# Patient Record
Sex: MALE | Employment: OTHER | URBAN - METROPOLITAN AREA
[De-identification: names, ages, dates, MRNs, and addresses within clinical notes are randomized per-mention and may not be internally consistent; named-entity substitution may affect disease eponyms.]

---

## 2018-05-15 PROBLEM — R56.9 SEIZURE (HCC): Status: ACTIVE | Noted: 2018-05-15

## 2018-05-15 PROBLEM — J18.9 PNEUMONIA: Status: ACTIVE | Noted: 2018-05-15

## 2018-05-15 PROBLEM — R11.2 NAUSEA AND VOMITING: Status: ACTIVE | Noted: 2018-05-15

## 2018-05-15 PROBLEM — F10.10 ALCOHOL ABUSE: Status: ACTIVE | Noted: 2018-05-15

## 2018-05-15 PROBLEM — E86.0 DEHYDRATION: Status: ACTIVE | Noted: 2018-05-15

## 2018-06-14 PROBLEM — E86.0 DEHYDRATION: Status: RESOLVED | Noted: 2018-05-15 | Resolved: 2018-06-14

## 2019-12-27 ENCOUNTER — HOSPITAL ENCOUNTER (EMERGENCY)
Age: 54
Discharge: HOME | End: 2019-12-27
Attending: EMERGENCY MEDICINE
Payer: MEDICARE

## 2019-12-27 VITALS
DIASTOLIC BLOOD PRESSURE: 48 MMHG | WEIGHT: 150 LBS | SYSTOLIC BLOOD PRESSURE: 132 MMHG | TEMPERATURE: 98.7 F | OXYGEN SATURATION: 98 % | BODY MASS INDEX: 21 KG/M2 | HEART RATE: 77 BPM | RESPIRATION RATE: 17 BRPM | HEIGHT: 71 IN

## 2019-12-27 DIAGNOSIS — G40.909 SEIZURE DISORDER (HCC): Primary | ICD-10-CM

## 2019-12-27 LAB
A/G RATIO: 0.7 (ref 1.1–2.2)
ALBUMIN SERPL-MCNC: 3.1 G/DL (ref 3.4–5)
ALP BLD-CCNC: 106 U/L (ref 40–129)
ALT SERPL-CCNC: 94 U/L (ref 10–40)
ANION GAP SERPL CALCULATED.3IONS-SCNC: 13 MMOL/L (ref 3–16)
AST SERPL-CCNC: 184 U/L (ref 15–37)
BASOPHILS ABSOLUTE: 0 K/UL (ref 0–0.2)
BASOPHILS RELATIVE PERCENT: 0.9 %
BILIRUB SERPL-MCNC: 1 MG/DL (ref 0–1)
BUN BLDV-MCNC: 9 MG/DL (ref 7–20)
CALCIUM SERPL-MCNC: 8.6 MG/DL (ref 8.3–10.6)
CHLORIDE BLD-SCNC: 98 MMOL/L (ref 99–110)
CO2: 26 MMOL/L (ref 21–32)
CREAT SERPL-MCNC: 0.9 MG/DL (ref 0.9–1.3)
EOSINOPHILS ABSOLUTE: 0 K/UL (ref 0–0.6)
EOSINOPHILS RELATIVE PERCENT: 0.7 %
GFR AFRICAN AMERICAN: >60
GFR NON-AFRICAN AMERICAN: >60
GLOBULIN: 4.4 G/DL
GLUCOSE BLD-MCNC: 124 MG/DL (ref 70–99)
HCT VFR BLD CALC: 36.1 % (ref 40.5–52.5)
HEMOGLOBIN: 12.1 G/DL (ref 13.5–17.5)
KEPPRA DOSE AMT: ABNORMAL
KEPPRA: <2 UG/ML (ref 6–46)
LYMPHOCYTES ABSOLUTE: 1.7 K/UL (ref 1–5.1)
LYMPHOCYTES RELATIVE PERCENT: 32.4 %
MCH RBC QN AUTO: 31.8 PG (ref 26–34)
MCHC RBC AUTO-ENTMCNC: 33.5 G/DL (ref 31–36)
MCV RBC AUTO: 95.2 FL (ref 80–100)
MONOCYTES ABSOLUTE: 0.4 K/UL (ref 0–1.3)
MONOCYTES RELATIVE PERCENT: 7.8 %
NEUTROPHILS ABSOLUTE: 3 K/UL (ref 1.7–7.7)
NEUTROPHILS RELATIVE PERCENT: 58.2 %
PDW BLD-RTO: 17.1 % (ref 12.4–15.4)
PLATELET # BLD: 105 K/UL (ref 135–450)
PMV BLD AUTO: 7.8 FL (ref 5–10.5)
POTASSIUM REFLEX MAGNESIUM: 4 MMOL/L (ref 3.5–5.1)
RBC # BLD: 3.79 M/UL (ref 4.2–5.9)
SODIUM BLD-SCNC: 137 MMOL/L (ref 136–145)
TOTAL PROTEIN: 7.5 G/DL (ref 6.4–8.2)
WBC # BLD: 5.1 K/UL (ref 4–11)

## 2019-12-27 PROCEDURE — 99284 EMERGENCY DEPT VISIT MOD MDM: CPT

## 2019-12-27 PROCEDURE — 80177 DRUG SCRN QUAN LEVETIRACETAM: CPT

## 2019-12-27 PROCEDURE — 6370000000 HC RX 637 (ALT 250 FOR IP): Performed by: EMERGENCY MEDICINE

## 2019-12-27 PROCEDURE — 85025 COMPLETE CBC W/AUTO DIFF WBC: CPT

## 2019-12-27 PROCEDURE — 36415 COLL VENOUS BLD VENIPUNCTURE: CPT

## 2019-12-27 PROCEDURE — 80053 COMPREHEN METABOLIC PANEL: CPT

## 2019-12-27 RX ORDER — ACETAMINOPHEN 500 MG
1000 TABLET ORAL ONCE
Status: COMPLETED | OUTPATIENT
Start: 2019-12-27 | End: 2019-12-27

## 2019-12-27 RX ORDER — LEVETIRACETAM 10 MG/ML
1000 INJECTION INTRAVASCULAR ONCE
Status: DISCONTINUED | OUTPATIENT
Start: 2019-12-27 | End: 2019-12-27

## 2019-12-27 RX ORDER — LEVETIRACETAM 500 MG/1
1000 TABLET ORAL ONCE
Status: COMPLETED | OUTPATIENT
Start: 2019-12-27 | End: 2019-12-27

## 2019-12-27 RX ADMIN — LEVETIRACETAM 1000 MG: 500 TABLET ORAL at 15:11

## 2019-12-27 RX ADMIN — ACETAMINOPHEN 1000 MG: 500 TABLET ORAL at 13:21

## 2019-12-27 ASSESSMENT — PAIN DESCRIPTION - PAIN TYPE: TYPE: ACUTE PAIN

## 2019-12-27 ASSESSMENT — PAIN SCALES - GENERAL
PAINLEVEL_OUTOF10: 4
PAINLEVEL_OUTOF10: 6

## 2020-12-29 ENCOUNTER — APPOINTMENT (OUTPATIENT)
Dept: GENERAL RADIOLOGY | Age: 55
DRG: 871 | End: 2020-12-29
Payer: MEDICARE

## 2020-12-29 ENCOUNTER — HOSPITAL ENCOUNTER (INPATIENT)
Age: 55
LOS: 6 days | Discharge: HOME | DRG: 871 | End: 2021-01-05
Attending: EMERGENCY MEDICINE | Admitting: HOSPITALIST
Payer: MEDICARE

## 2020-12-29 ENCOUNTER — APPOINTMENT (OUTPATIENT)
Dept: CT IMAGING | Age: 55
DRG: 871 | End: 2020-12-29
Payer: MEDICARE

## 2020-12-29 DIAGNOSIS — E83.42 HYPOMAGNESEMIA: ICD-10-CM

## 2020-12-29 DIAGNOSIS — G93.40 ACUTE ENCEPHALOPATHY: Primary | ICD-10-CM

## 2020-12-29 DIAGNOSIS — R50.9 ACUTE FEBRILE ILLNESS: ICD-10-CM

## 2020-12-29 DIAGNOSIS — R56.9 SEIZURES (HCC): ICD-10-CM

## 2020-12-29 DIAGNOSIS — E87.6 HYPOKALEMIA: ICD-10-CM

## 2020-12-29 LAB
BASOPHILS ABSOLUTE: 0 K/UL (ref 0–0.2)
BASOPHILS RELATIVE PERCENT: 0.3 %
EOSINOPHILS ABSOLUTE: 0 K/UL (ref 0–0.6)
EOSINOPHILS RELATIVE PERCENT: 0 %
HCT VFR BLD CALC: 36.8 % (ref 40.5–52.5)
HEMOGLOBIN: 11.5 G/DL (ref 13.5–17.5)
LYMPHOCYTES ABSOLUTE: 1.1 K/UL (ref 1–5.1)
LYMPHOCYTES RELATIVE PERCENT: 8.5 %
MCH RBC QN AUTO: 29.5 PG (ref 26–34)
MCHC RBC AUTO-ENTMCNC: 31.4 G/DL (ref 31–36)
MCV RBC AUTO: 93.9 FL (ref 80–100)
MONOCYTES ABSOLUTE: 1.1 K/UL (ref 0–1.3)
MONOCYTES RELATIVE PERCENT: 8.2 %
NEUTROPHILS ABSOLUTE: 11.2 K/UL (ref 1.7–7.7)
NEUTROPHILS RELATIVE PERCENT: 83 %
PDW BLD-RTO: 15.1 % (ref 12.4–15.4)
PLATELET # BLD: 176 K/UL (ref 135–450)
PMV BLD AUTO: 7.6 FL (ref 5–10.5)
RBC # BLD: 3.91 M/UL (ref 4.2–5.9)
WBC # BLD: 13.5 K/UL (ref 4–11)

## 2020-12-29 PROCEDURE — 87449 NOS EACH ORGANISM AG IA: CPT

## 2020-12-29 PROCEDURE — 6360000002 HC RX W HCPCS: Performed by: EMERGENCY MEDICINE

## 2020-12-29 PROCEDURE — 96374 THER/PROPH/DIAG INJ IV PUSH: CPT

## 2020-12-29 PROCEDURE — 36556 INSERT NON-TUNNEL CV CATH: CPT

## 2020-12-29 PROCEDURE — 99284 EMERGENCY DEPT VISIT MOD MDM: CPT

## 2020-12-29 PROCEDURE — 80053 COMPREHEN METABOLIC PANEL: CPT

## 2020-12-29 PROCEDURE — 85025 COMPLETE CBC W/AUTO DIFF WBC: CPT

## 2020-12-29 PROCEDURE — 82140 ASSAY OF AMMONIA: CPT

## 2020-12-29 PROCEDURE — 83690 ASSAY OF LIPASE: CPT

## 2020-12-29 PROCEDURE — G0480 DRUG TEST DEF 1-7 CLASSES: HCPCS

## 2020-12-29 PROCEDURE — 80307 DRUG TEST PRSMV CHEM ANLYZR: CPT

## 2020-12-29 PROCEDURE — 71045 X-RAY EXAM CHEST 1 VIEW: CPT

## 2020-12-29 PROCEDURE — 81003 URINALYSIS AUTO W/O SCOPE: CPT

## 2020-12-29 PROCEDURE — 84484 ASSAY OF TROPONIN QUANT: CPT

## 2020-12-29 PROCEDURE — 83605 ASSAY OF LACTIC ACID: CPT

## 2020-12-29 PROCEDURE — 80177 DRUG SCRN QUAN LEVETIRACETAM: CPT

## 2020-12-29 PROCEDURE — 83880 ASSAY OF NATRIURETIC PEPTIDE: CPT

## 2020-12-29 PROCEDURE — 93005 ELECTROCARDIOGRAM TRACING: CPT | Performed by: EMERGENCY MEDICINE

## 2020-12-29 PROCEDURE — 84145 PROCALCITONIN (PCT): CPT

## 2020-12-29 PROCEDURE — 82550 ASSAY OF CK (CPK): CPT

## 2020-12-29 PROCEDURE — 36415 COLL VENOUS BLD VENIPUNCTURE: CPT

## 2020-12-29 RX ORDER — LORAZEPAM 2 MG/ML
2 INJECTION INTRAMUSCULAR ONCE
Status: COMPLETED | OUTPATIENT
Start: 2020-12-29 | End: 2020-12-29

## 2020-12-29 RX ORDER — HALOPERIDOL 5 MG/ML
5 INJECTION INTRAMUSCULAR ONCE
Status: COMPLETED | OUTPATIENT
Start: 2020-12-30 | End: 2020-12-30

## 2020-12-29 RX ORDER — LORAZEPAM 2 MG/ML
1 INJECTION INTRAMUSCULAR EVERY 30 MIN PRN
Status: COMPLETED | OUTPATIENT
Start: 2020-12-29 | End: 2020-12-30

## 2020-12-29 RX ORDER — DIPHENHYDRAMINE HYDROCHLORIDE 50 MG/ML
50 INJECTION INTRAMUSCULAR; INTRAVENOUS ONCE
Status: COMPLETED | OUTPATIENT
Start: 2020-12-30 | End: 2020-12-30

## 2020-12-29 RX ORDER — ACETAMINOPHEN 500 MG
1000 TABLET ORAL ONCE
Status: DISCONTINUED | OUTPATIENT
Start: 2020-12-29 | End: 2020-12-30

## 2020-12-29 RX ORDER — SODIUM CHLORIDE 9 MG/ML
30 INJECTION, SOLUTION INTRAVENOUS ONCE
Status: COMPLETED | OUTPATIENT
Start: 2020-12-29 | End: 2020-12-30

## 2020-12-29 RX ADMIN — LORAZEPAM 2 MG: 2 INJECTION, SOLUTION INTRAMUSCULAR; INTRAVENOUS at 23:35

## 2020-12-30 ENCOUNTER — APPOINTMENT (OUTPATIENT)
Dept: CT IMAGING | Age: 55
DRG: 871 | End: 2020-12-30
Payer: MEDICARE

## 2020-12-30 LAB
A/G RATIO: 0.4 (ref 1.1–2.2)
ACETAMINOPHEN LEVEL: <5 UG/ML (ref 10–30)
ALBUMIN SERPL-MCNC: 2.9 G/DL (ref 3.4–5)
ALP BLD-CCNC: 106 U/L (ref 40–129)
ALT SERPL-CCNC: 14 U/L (ref 10–40)
AMMONIA: 67 UMOL/L (ref 16–60)
AMPHETAMINE SCREEN, URINE: NORMAL
ANION GAP SERPL CALCULATED.3IONS-SCNC: 15 MMOL/L (ref 3–16)
AST SERPL-CCNC: 48 U/L (ref 15–37)
BARBITURATE SCREEN URINE: NORMAL
BENZODIAZEPINE SCREEN, URINE: NORMAL
BILIRUB SERPL-MCNC: 1.2 MG/DL (ref 0–1)
BILIRUBIN URINE: NEGATIVE
BLOOD, URINE: NEGATIVE
BUN BLDV-MCNC: 7 MG/DL (ref 7–20)
CALCIUM SERPL-MCNC: 9.2 MG/DL (ref 8.3–10.6)
CANNABINOID SCREEN URINE: NORMAL
CHLORIDE BLD-SCNC: 100 MMOL/L (ref 99–110)
CLARITY: CLEAR
CO2: 25 MMOL/L (ref 21–32)
COCAINE METABOLITE SCREEN URINE: NORMAL
COLOR: YELLOW
CREAT SERPL-MCNC: 0.8 MG/DL (ref 0.9–1.3)
ETHANOL: NORMAL MG/DL (ref 0–0.08)
GFR AFRICAN AMERICAN: >60
GFR NON-AFRICAN AMERICAN: >60
GLOBULIN: 6.5 G/DL
GLUCOSE BLD-MCNC: 126 MG/DL (ref 70–99)
GLUCOSE URINE: NEGATIVE MG/DL
KEPPRA DOSE AMT: ABNORMAL
KEPPRA: <2 UG/ML (ref 6–46)
KETONES, URINE: NEGATIVE MG/DL
LACTIC ACID: 1.5 MMOL/L (ref 0.4–2)
LACTIC ACID: 1.8 MMOL/L (ref 0.4–2)
LACTIC ACID: 6.2 MMOL/L (ref 0.4–2)
LEUKOCYTE ESTERASE, URINE: NEGATIVE
LIPASE: 52 U/L (ref 13–60)
Lab: NORMAL
METHADONE SCREEN, URINE: NORMAL
MICROSCOPIC EXAMINATION: NORMAL
NITRITE, URINE: NEGATIVE
OPIATE SCREEN URINE: NORMAL
OXYCODONE URINE: NORMAL
PH UA: 7
PH UA: 7 (ref 5–8)
PHENCYCLIDINE SCREEN URINE: NORMAL
POTASSIUM REFLEX MAGNESIUM: 3.9 MMOL/L (ref 3.5–5.1)
PRO-BNP: 1757 PG/ML (ref 0–124)
PROCALCITONIN: 0.23 NG/ML (ref 0–0.15)
PROPOXYPHENE SCREEN: NORMAL
PROTEIN UA: NEGATIVE MG/DL
SALICYLATE, SERUM: <0.3 MG/DL (ref 15–30)
SARS-COV-2, NAAT: NOT DETECTED
SODIUM BLD-SCNC: 140 MMOL/L (ref 136–145)
SPECIFIC GRAVITY UA: 1.01 (ref 1–1.03)
TOTAL CK: 118 U/L (ref 39–308)
TOTAL PROTEIN: 9.4 G/DL (ref 6.4–8.2)
TROPONIN: <0.01 NG/ML
URINE REFLEX TO CULTURE: NORMAL
URINE TYPE: NORMAL
UROBILINOGEN, URINE: 0.2 E.U./DL

## 2020-12-30 PROCEDURE — 96366 THER/PROPH/DIAG IV INF ADDON: CPT

## 2020-12-30 PROCEDURE — 70450 CT HEAD/BRAIN W/O DYE: CPT

## 2020-12-30 PROCEDURE — 6360000002 HC RX W HCPCS: Performed by: HOSPITALIST

## 2020-12-30 PROCEDURE — 87150 DNA/RNA AMPLIFIED PROBE: CPT

## 2020-12-30 PROCEDURE — 2500000003 HC RX 250 WO HCPCS: Performed by: HOSPITALIST

## 2020-12-30 PROCEDURE — 96376 TX/PRO/DX INJ SAME DRUG ADON: CPT

## 2020-12-30 PROCEDURE — 95816 EEG AWAKE AND DROWSY: CPT | Performed by: PSYCHIATRY & NEUROLOGY

## 2020-12-30 PROCEDURE — 99223 1ST HOSP IP/OBS HIGH 75: CPT | Performed by: INTERNAL MEDICINE

## 2020-12-30 PROCEDURE — 83605 ASSAY OF LACTIC ACID: CPT

## 2020-12-30 PROCEDURE — 36415 COLL VENOUS BLD VENIPUNCTURE: CPT

## 2020-12-30 PROCEDURE — 87040 BLOOD CULTURE FOR BACTERIA: CPT

## 2020-12-30 PROCEDURE — 2500000003 HC RX 250 WO HCPCS: Performed by: EMERGENCY MEDICINE

## 2020-12-30 PROCEDURE — 2500000003 HC RX 250 WO HCPCS

## 2020-12-30 PROCEDURE — 87205 SMEAR GRAM STAIN: CPT

## 2020-12-30 PROCEDURE — 95819 EEG AWAKE AND ASLEEP: CPT

## 2020-12-30 PROCEDURE — 87186 SC STD MICRODIL/AGAR DIL: CPT

## 2020-12-30 PROCEDURE — 2580000003 HC RX 258: Performed by: HOSPITALIST

## 2020-12-30 PROCEDURE — 87070 CULTURE OTHR SPECIMN AEROBIC: CPT

## 2020-12-30 PROCEDURE — U0002 COVID-19 LAB TEST NON-CDC: HCPCS

## 2020-12-30 PROCEDURE — 2580000003 HC RX 258: Performed by: EMERGENCY MEDICINE

## 2020-12-30 PROCEDURE — 99223 1ST HOSP IP/OBS HIGH 75: CPT | Performed by: PSYCHIATRY & NEUROLOGY

## 2020-12-30 PROCEDURE — 96365 THER/PROPH/DIAG IV INF INIT: CPT

## 2020-12-30 PROCEDURE — 6360000002 HC RX W HCPCS: Performed by: EMERGENCY MEDICINE

## 2020-12-30 PROCEDURE — 96372 THER/PROPH/DIAG INJ SC/IM: CPT

## 2020-12-30 PROCEDURE — 2000000000 HC ICU R&B

## 2020-12-30 PROCEDURE — 2580000003 HC RX 258

## 2020-12-30 RX ORDER — SODIUM CHLORIDE 0.9 % (FLUSH) 0.9 %
10 SYRINGE (ML) INJECTION EVERY 12 HOURS SCHEDULED
Status: DISCONTINUED | OUTPATIENT
Start: 2020-12-30 | End: 2021-01-05 | Stop reason: HOSPADM

## 2020-12-30 RX ORDER — ZIPRASIDONE MESYLATE 20 MG/ML
20 INJECTION, POWDER, LYOPHILIZED, FOR SOLUTION INTRAMUSCULAR ONCE
Status: COMPLETED | OUTPATIENT
Start: 2020-12-30 | End: 2020-12-30

## 2020-12-30 RX ORDER — PROMETHAZINE HYDROCHLORIDE 25 MG/1
12.5 TABLET ORAL EVERY 6 HOURS PRN
Status: DISCONTINUED | OUTPATIENT
Start: 2020-12-30 | End: 2021-01-05 | Stop reason: HOSPADM

## 2020-12-30 RX ORDER — LORAZEPAM 2 MG/ML
3 INJECTION INTRAMUSCULAR
Status: DISCONTINUED | OUTPATIENT
Start: 2020-12-30 | End: 2021-01-05 | Stop reason: HOSPADM

## 2020-12-30 RX ORDER — MAGNESIUM SULFATE IN WATER 40 MG/ML
2 INJECTION, SOLUTION INTRAVENOUS PRN
Status: DISCONTINUED | OUTPATIENT
Start: 2020-12-30 | End: 2020-12-31 | Stop reason: SDUPTHER

## 2020-12-30 RX ORDER — ACETAMINOPHEN 650 MG/1
650 SUPPOSITORY RECTAL EVERY 6 HOURS PRN
Status: DISCONTINUED | OUTPATIENT
Start: 2020-12-30 | End: 2021-01-05 | Stop reason: HOSPADM

## 2020-12-30 RX ORDER — BENZONATATE 100 MG/1
100 CAPSULE ORAL 3 TIMES DAILY PRN
Status: DISCONTINUED | OUTPATIENT
Start: 2020-12-30 | End: 2021-01-05 | Stop reason: HOSPADM

## 2020-12-30 RX ORDER — SODIUM CHLORIDE 0.9 % (FLUSH) 0.9 %
10 SYRINGE (ML) INJECTION EVERY 12 HOURS SCHEDULED
Status: DISCONTINUED | OUTPATIENT
Start: 2020-12-30 | End: 2020-12-30 | Stop reason: SDUPTHER

## 2020-12-30 RX ORDER — POTASSIUM CHLORIDE 20 MEQ/1
40 TABLET, EXTENDED RELEASE ORAL PRN
Status: DISCONTINUED | OUTPATIENT
Start: 2020-12-30 | End: 2021-01-05 | Stop reason: HOSPADM

## 2020-12-30 RX ORDER — ONDANSETRON 2 MG/ML
4 INJECTION INTRAMUSCULAR; INTRAVENOUS EVERY 6 HOURS PRN
Status: DISCONTINUED | OUTPATIENT
Start: 2020-12-30 | End: 2021-01-05 | Stop reason: HOSPADM

## 2020-12-30 RX ORDER — POLYETHYLENE GLYCOL 3350 17 G/17G
17 POWDER, FOR SOLUTION ORAL DAILY PRN
Status: DISCONTINUED | OUTPATIENT
Start: 2020-12-30 | End: 2021-01-05 | Stop reason: HOSPADM

## 2020-12-30 RX ORDER — LORAZEPAM 2 MG/ML
2 INJECTION INTRAMUSCULAR
Status: DISCONTINUED | OUTPATIENT
Start: 2020-12-30 | End: 2021-01-05 | Stop reason: HOSPADM

## 2020-12-30 RX ORDER — LORAZEPAM 1 MG/1
3 TABLET ORAL
Status: DISCONTINUED | OUTPATIENT
Start: 2020-12-30 | End: 2021-01-05 | Stop reason: HOSPADM

## 2020-12-30 RX ORDER — SODIUM CHLORIDE 9 MG/ML
INJECTION, SOLUTION INTRAVENOUS CONTINUOUS
Status: DISCONTINUED | OUTPATIENT
Start: 2020-12-30 | End: 2021-01-01 | Stop reason: ALTCHOICE

## 2020-12-30 RX ORDER — LEVETIRACETAM 10 MG/ML
1000 INJECTION INTRAVASCULAR ONCE
Status: COMPLETED | OUTPATIENT
Start: 2020-12-30 | End: 2020-12-30

## 2020-12-30 RX ORDER — DEXMEDETOMIDINE HYDROCHLORIDE 4 UG/ML
0.2 INJECTION, SOLUTION INTRAVENOUS CONTINUOUS
Status: DISCONTINUED | OUTPATIENT
Start: 2020-12-30 | End: 2021-01-01

## 2020-12-30 RX ORDER — SODIUM CHLORIDE 0.9 % (FLUSH) 0.9 %
10 SYRINGE (ML) INJECTION PRN
Status: DISCONTINUED | OUTPATIENT
Start: 2020-12-30 | End: 2020-12-30 | Stop reason: SDUPTHER

## 2020-12-30 RX ORDER — LORAZEPAM 1 MG/1
2 TABLET ORAL
Status: DISCONTINUED | OUTPATIENT
Start: 2020-12-30 | End: 2021-01-05 | Stop reason: HOSPADM

## 2020-12-30 RX ORDER — LORAZEPAM 2 MG/ML
2 INJECTION INTRAMUSCULAR EVERY 4 HOURS PRN
Status: DISCONTINUED | OUTPATIENT
Start: 2020-12-30 | End: 2021-01-05 | Stop reason: HOSPADM

## 2020-12-30 RX ORDER — LANOLIN ALCOHOL/MO/W.PET/CERES
100 CREAM (GRAM) TOPICAL DAILY
Status: DISCONTINUED | OUTPATIENT
Start: 2020-12-30 | End: 2020-12-31 | Stop reason: SDUPTHER

## 2020-12-30 RX ORDER — ACETAMINOPHEN 650 MG/1
650 SUPPOSITORY RECTAL ONCE
Status: DISCONTINUED | OUTPATIENT
Start: 2020-12-30 | End: 2020-12-30 | Stop reason: HOSPADM

## 2020-12-30 RX ORDER — SODIUM CHLORIDE 0.9 % (FLUSH) 0.9 %
10 SYRINGE (ML) INJECTION PRN
Status: DISCONTINUED | OUTPATIENT
Start: 2020-12-30 | End: 2021-01-05 | Stop reason: HOSPADM

## 2020-12-30 RX ORDER — MULTIVITAMIN WITH IRON
1 TABLET ORAL DAILY
Status: DISCONTINUED | OUTPATIENT
Start: 2020-12-30 | End: 2021-01-05 | Stop reason: HOSPADM

## 2020-12-30 RX ORDER — POTASSIUM CHLORIDE 7.45 MG/ML
10 INJECTION INTRAVENOUS PRN
Status: DISCONTINUED | OUTPATIENT
Start: 2020-12-30 | End: 2021-01-05 | Stop reason: HOSPADM

## 2020-12-30 RX ORDER — ACETAMINOPHEN 325 MG/1
650 TABLET ORAL EVERY 6 HOURS PRN
Status: DISCONTINUED | OUTPATIENT
Start: 2020-12-30 | End: 2021-01-05 | Stop reason: HOSPADM

## 2020-12-30 RX ORDER — LORAZEPAM 2 MG/ML
1 INJECTION INTRAMUSCULAR
Status: DISCONTINUED | OUTPATIENT
Start: 2020-12-30 | End: 2021-01-05 | Stop reason: HOSPADM

## 2020-12-30 RX ORDER — LORAZEPAM 1 MG/1
4 TABLET ORAL
Status: DISCONTINUED | OUTPATIENT
Start: 2020-12-30 | End: 2021-01-05 | Stop reason: HOSPADM

## 2020-12-30 RX ORDER — LORAZEPAM 2 MG/ML
4 INJECTION INTRAMUSCULAR
Status: DISCONTINUED | OUTPATIENT
Start: 2020-12-30 | End: 2021-01-05 | Stop reason: HOSPADM

## 2020-12-30 RX ORDER — LORAZEPAM 1 MG/1
1 TABLET ORAL
Status: DISCONTINUED | OUTPATIENT
Start: 2020-12-30 | End: 2021-01-05 | Stop reason: HOSPADM

## 2020-12-30 RX ADMIN — Medication 10 ML: at 21:37

## 2020-12-30 RX ADMIN — ENOXAPARIN SODIUM 40 MG: 40 INJECTION SUBCUTANEOUS at 10:58

## 2020-12-30 RX ADMIN — LEVETIRACETAM 750 MG: 100 INJECTION, SOLUTION INTRAVENOUS at 22:10

## 2020-12-30 RX ADMIN — Medication 10 ML: at 10:58

## 2020-12-30 RX ADMIN — LORAZEPAM 1 MG: 2 INJECTION INTRAMUSCULAR; INTRAVENOUS at 00:00

## 2020-12-30 RX ADMIN — FAMOTIDINE 20 MG: 10 INJECTION, SOLUTION INTRAVENOUS at 10:58

## 2020-12-30 RX ADMIN — LEVETIRACETAM 1000 MG: 10 INJECTION INTRAVENOUS at 03:37

## 2020-12-30 RX ADMIN — FOLIC ACID: 5 INJECTION, SOLUTION INTRAMUSCULAR; INTRAVENOUS; SUBCUTANEOUS at 01:32

## 2020-12-30 RX ADMIN — METRONIDAZOLE 500 MG: 500 INJECTION, SOLUTION INTRAVENOUS at 07:08

## 2020-12-30 RX ADMIN — METRONIDAZOLE 500 MG: 500 INJECTION, SOLUTION INTRAVENOUS at 12:30

## 2020-12-30 RX ADMIN — VANCOMYCIN HYDROCHLORIDE 1000 MG: 1 INJECTION, POWDER, LYOPHILIZED, FOR SOLUTION INTRAVENOUS at 21:35

## 2020-12-30 RX ADMIN — ZIPRASIDONE MESYLATE 20 MG: 20 INJECTION, POWDER, LYOPHILIZED, FOR SOLUTION INTRAMUSCULAR at 00:55

## 2020-12-30 RX ADMIN — DIPHENHYDRAMINE HYDROCHLORIDE 50 MG: 50 INJECTION, SOLUTION INTRAMUSCULAR; INTRAVENOUS at 00:00

## 2020-12-30 RX ADMIN — VANCOMYCIN HYDROCHLORIDE 1000 MG: 1 INJECTION, POWDER, LYOPHILIZED, FOR SOLUTION INTRAVENOUS at 05:17

## 2020-12-30 RX ADMIN — SODIUM CHLORIDE 68 MCG: 9 INJECTION, SOLUTION INTRAVENOUS at 04:16

## 2020-12-30 RX ADMIN — SODIUM CHLORIDE: 9 INJECTION, SOLUTION INTRAVENOUS at 07:05

## 2020-12-30 RX ADMIN — CEFEPIME HYDROCHLORIDE 2 G: 2 INJECTION, POWDER, FOR SOLUTION INTRAVENOUS at 05:17

## 2020-12-30 RX ADMIN — SODIUM CHLORIDE 2040 ML: 9 INJECTION, SOLUTION INTRAVENOUS at 01:32

## 2020-12-30 RX ADMIN — DEXMEDETOMIDINE HYDROCHLORIDE 0.2 MCG/KG/HR: 4 INJECTION, SOLUTION INTRAVENOUS at 04:40

## 2020-12-30 RX ADMIN — LORAZEPAM 1 MG: 2 INJECTION INTRAMUSCULAR; INTRAVENOUS at 02:40

## 2020-12-30 RX ADMIN — METRONIDAZOLE 500 MG: 500 INJECTION, SOLUTION INTRAVENOUS at 22:54

## 2020-12-30 RX ADMIN — HALOPERIDOL LACTATE 5 MG: 5 INJECTION INTRAMUSCULAR at 00:00

## 2020-12-30 RX ADMIN — FAMOTIDINE 20 MG: 10 INJECTION, SOLUTION INTRAVENOUS at 22:06

## 2020-12-30 ASSESSMENT — PAIN SCALES - WONG BAKER: WONGBAKER_NUMERICALRESPONSE: 0

## 2020-12-30 NOTE — PROGRESS NOTES
Admitted from ED with acute encephalopathy and question of seizure activity. Placed in 2907 and put on cardiac monitor and oxygen. Currently on NS IV and precedex drip.

## 2020-12-30 NOTE — ED NOTES
Bed: 06  Expected date:   Expected time:   Means of arrival:   Comments:  Room 36 [facility]  12/30/20 9035

## 2020-12-30 NOTE — PROGRESS NOTES
[facility] PROGRESS NOTE    12/30/2020 3:20 PM        Name: Dot Calderón . Admitted: 12/29/2020  Primary Care Provider: No primary care provider on file. (Tel: None)      Chief Complaint   Patient presents with    Seizures     pt had three witnesses seizures per squad. pt with hx of seizures. pt post ictal upon squad arrival and upon pt arrival to ER. pt appears confused and restless. Brief History: Patient is a 59 yo male with hx seizure, ETOH abuse, tobacco use. He presented to ER per EMS after having multiple seizures. He was confused and agitated in ER requiring restraints, now on Precedex infusion. CT head with no acute abnormality. COVID-19, NAAT, negative. Subjective:  ROS unable to be obtained secondary sedation. Patient admitted early this morning by colleague, remains in ER, awaiting unit bed. Presently resting on stretch. He is sedated, on Precedex infusion.      Reviewed interval ancillary notes    Current Medications      sodium chloride flush 0.9 % injection 10 mL, 2 times per day      sodium chloride flush 0.9 % injection 10 mL, PRN      enoxaparin (LOVENOX) injection 40 mg, Daily      promethazine (PHENERGAN) tablet 12.5 mg, Q6H PRN    Or      ondansetron (ZOFRAN) injection 4 mg, Q6H PRN      polyethylene glycol (GLYCOLAX) packet 17 g, Daily PRN      acetaminophen (TYLENOL) tablet 650 mg, Q6H PRN    Or      acetaminophen (TYLENOL) suppository 650 mg, Q6H PRN      0.9 % sodium chloride infusion, Continuous      potassium chloride (KLOR-CON M) extended release tablet 40 mEq, PRN    Or      potassium bicarb-citric acid (EFFER-K) effervescent tablet 40 mEq, PRN    Or      potassium chloride 10 mEq/100 mL IVPB (Peripheral Line), PRN      magnesium sulfate 2 g in 50 mL IVPB premix, PRN      famotidine (PEPCID) injection 20 mg, BID      levETIRAcetam (KEPPRA) 750 mg in sodium chloride 0.9 % 100 mL IVPB, Q12H      LORazepam (ATIVAN) injection 2 mg, Q4H PRN      [START ON 12/31/2020] cefTRIAXone (ROCEPHIN) 2 g IVPB in D5W 50ml minibag, Q24H      metronidazole (FLAGYL) 500 mg in NaCl 100 mL IVPB premix, Q6H      benzonatate (TESSALON) capsule 100 mg, TID PRN      thiamine tablet 100 mg, Daily      multivitamin 1 tablet, Daily      LORazepam (ATIVAN) tablet 1 mg, Q1H PRN    Or      LORazepam (ATIVAN) injection 1 mg, Q1H PRN    Or      LORazepam (ATIVAN) tablet 2 mg, Q1H PRN    Or      LORazepam (ATIVAN) injection 2 mg, Q1H PRN    Or      LORazepam (ATIVAN) tablet 3 mg, Q1H PRN    Or      LORazepam (ATIVAN) injection 3 mg, Q1H PRN    Or      LORazepam (ATIVAN) tablet 4 mg, Q1H PRN    Or      LORazepam (ATIVAN) injection 4 mg, Q1H PRN      dexmedetomidine (PRECEDEX) 400 mcg in sodium chloride 0.9 % 100 mL infusion, Continuous      vancomycin 1000 mg IVPB in 250 mL D5W addavial, Q12H        Objective:  BP (!) 94/54   Pulse 78   Temp 97.5 °F (36.4 °C) (Axillary)   Resp 19   Ht 5' 11\" (1.803 m)   Wt 150 lb (68 kg)   SpO2 99%   BMI 20.92 kg/m²   No intake or output data in the 24 hours ending 12/30/20 1520   Wt Readings from Last 3 Encounters:   12/29/20 150 lb (68 kg)   12/27/19 150 lb (68 kg)   05/17/18 136 lb 3.9 oz (61.8 kg)       General appearance:  Sedated  Eyes: Pupils equal.  Cardiovascular: Sinus rhythm on tele. Regular rhythm, normal S1, S2. No murmur. No edema in lower extremities  Respiratory: Not using accessory muscles. Diminished throughout.  O2 sats high 90s room air   GI: Abdomen soft, not distended, normal bowel sounds  Musculoskeletal: Unable to assess  Neurology: Unable to assess  Skin: Warm, dry, normal turgor    Labs and Tests:  CBC:   Recent Labs     12/29/20  2350   WBC 13.5*   HGB 11.5*        BMP:    Recent Labs     12/29/20  2350      K 3.9      CO2 25   BUN 7   CREATININE 0.8*   GLUCOSE 126*     Hepatic:   Recent Labs 12/29/20  2350   AST 48*   ALT 14   BILITOT 1.2*   ALKPHOS 106       CXR 12/29/2020:  Scarring/atelectatic changes seen overlying the lateral aspect of the right   mid to lower lung field and right lung base.  Possible scarring and bullous   changes of the left lung apex is redemonstrated.  No confluent airspace   opacity or pleural effusion is seen. CT Head 12/30/2020:  No acute intracranial abnormality.       Moderate white matter disease, likely chronic small vessel ischemia. EEG 12/30/2020: Impression: This EEG is abnormal.  The generalized diffuse slowing is suggestive of mild to moderate diffuse encephalopathy. There is no evidence of epileptiform discharges, focal, or lateralizing abnormalities. Problem List  Active Problems:    Partial epilepsy with impairment of consciousness, intractable (HCC)    Personal history of noncompliance with medical treatment    Seizure secondary to subtherapeutic anticonvulsant medication ([address]    Partial symptomatic epilepsy with complex partial seizures, intractable, without status epilepticus ([address]    Noncompliance with medication regimen    Seizure ([address]    Alcohol abuse    Breakthrough seizure ([address]    Acute encephalopathy  Resolved Problems:    * No resolved hospital problems. *       Assessment & Plan:   1. Recurrent seizures. Possibly secondary to noncompliance, Keppra level < 2.0. Continue IV Keppra. Appreciate neurology recs. 2. Acute encephalopathy. Cause unclear. CT scan with no acute abnormality. EEG with mild to moderate diffuse encephalopathy. Neurology on board, considered less likely meningoencephalitis. Currently on Precedex secondary agitation. 3. ETOH abuse. Blood ETOH negative, urine drug screen negative. 4. Fever/sepsis present on admission. Temp 101.8 in ER,  and RR 27. Severe lactic acidosis 6.2->1.8->1.5, received IV fluids. WBC 13.5, procalcitonin 0.23, blood cultures x 2 in process. Source of infection not clear.  CXR with aspiration PNA versus atelectasis. COVID NAAT negative. Started on empiric antibiotics. ID consult pending.       Diet: Diet NPO Effective Now  Code:Full Code  DVT PPX: enoxaparin      KEEGAN Cage - CNP   12/30/2020 3:20 PM

## 2020-12-30 NOTE — CONSULTS
movements : midline  Musculoskeletal:  The patient can move all 4 extremities. No apparent focal weakness. Tone: Normal tone. No rigidity. Reflexes: 2+ in the arms and legs. Planters: flexor bilaterally. Coordination: NT due to confusion  Sensation: NT due to confusion  Gait/Posture: NT due to confusion        Data:  LABS:   Lab Results   Component Value Date     12/29/2020    K 3.9 12/29/2020     12/29/2020    CO2 25 12/29/2020    BUN 7 12/29/2020    CREATININE 0.8 12/29/2020    GFRAA >60 12/29/2020    LABGLOM >60 12/29/2020    GLUCOSE 126 12/29/2020    MG 1.50 05/17/2018    CALCIUM 9.2 12/29/2020     Lab Results   Component Value Date    WBC 13.5 12/29/2020    RBC 3.91 12/29/2020    HGB 11.5 12/29/2020    HCT 36.8 12/29/2020    MCV 93.9 12/29/2020    RDW 15.1 12/29/2020     12/29/2020     Lab Results   Component Value Date    INR 1.06 05/16/2018    PROTIME 12.0 05/16/2018       Neuroimaging were independently reviewed by me  Reviewed notes from different physicians  Reviewed lab and blood testing    Impression:  Acute encephalopathy and new onset seizures. So far unknown cause. Could be related to chronic alcohol abuse, ? History of epilepsy or underlying metabolic encephalopathy. Less likely meningoencephalitis as this point although will reconsider LP if he spiked another fever. Chronic alcohol abuse  History of seizures  Smoker  Rule out sepsis    Recommendation:  Continue current supportive care  ID work-up with blood and urine cultures  DT precautions  Continue Keppra 500 mg IV twice daily for now  Hydration  DVT and GI prophylaxis  Neurochecks  Blood pressure monitoring  EEG  Nicotine patch  Follow CBC and CMP with B1,2,  TSH and folate  We will follow. Thank you for referring such patient. If you have any questions regarding my consult note, please don't hesitate to call me.      Nancy Chaves MD  [phone]    This dictation was generated by voice recognition computer

## 2020-12-30 NOTE — ED NOTES
Assisted Dr. Carol Cerda with insertion of central line right femoral. NS infusing at present with Banana bag and Keppra infusing. No seizure activity noted but pt remains agitated. Respirations even and unlabored. Seizure pads in place.       Gladis Estevez RN  12/30/20 5288

## 2020-12-30 NOTE — PROGRESS NOTES
Clinical Pharmacy Note:    Pharmacy consulted to dose Vancomycin for pneumonia per Dr. Melia Rdz. Age: 60   Height: 5'11\"  Weight: 68 kg  Scr:0.8mg/dL      Started Vancomycin 1000 IV q12. Trough ordered before 4th dose (12/31/20 @8340) with an order to hold if trough greater than 20mcg/ml. Thanks for the consult!   Betsy Gasca, PharmD, [facility]

## 2020-12-30 NOTE — ED PROVIDER NOTES
[facility] Emergency Department    CHIEF COMPLAINT  Chief Complaint   Patient presents with    Seizures     pt had three witnesses seizures per squad. pt with hx of seizures. pt post ictal upon squad arrival and upon pt arrival to ER. pt appears confused and restless. HISTORY OF PRESENT ILLNESS  Lorie Carey is a 60 y.o. male  who presents to the ED complaining of apparently 3 witnessed seizures, unclear how they were described upon initial presentation. Patient is confused and disoriented upon arrival.  He does admit to drinking alcohol and mostly he is confused and disoriented and restless however at one point through his mumbling I believe he says he may have smoked marijuana as well. He is supposed to be on Keppra. Unclear if he is compliant or not. His last ED evaluation for seizures was about a year ago. Patient is able to tell me that he does not have a headache or neck pain or stiffness, does not have any chest or abdominal pains, or recent illnesses. However he is febrile at 101.8 on arrival, tachycardic, and confused. He tells me he has not been coughing or short of breath. No other complaints, modifying factors or associated symptoms. I have reviewed the following from the nursing documentation. Past Medical History:   Diagnosis Date    Alcohol abuse     Seizures ([address]      History reviewed. No pertinent surgical history. History reviewed. No pertinent family history.   Social History     Socioeconomic History    Marital status: Single     Spouse name: Not on file    Number of children: 0    Years of education: Not on file    Highest education level: Not on file   Occupational History    Not on file   Social Needs    Financial resource strain: Not on file    Food insecurity     Worry: Not on file     Inability: Not on file    Transportation needs     Medical: Not on file     Non-medical: Not on file   Tobacco Use    Smoking status: Current Every Day Smoker     Packs/day: 1.00     Years: 40.00     Pack years: 40.00    Smokeless tobacco: Never Used   Substance and Sexual Activity    Alcohol use:  Yes     Alcohol/week: 2.0 standard drinks     Types: 2 Cans of beer per week     Comment: every other week    Drug use: Yes     Types: Marijuana     Comment: once every other weekend (+ on drug screen 05/15/18)    Sexual activity: Not on file   Lifestyle    Physical activity     Days per week: Not on file     Minutes per session: Not on file    Stress: Not on file   Relationships    Social connections     Talks on phone: Not on file     Gets together: Not on file     Attends Confucianist service: Not on file     Active member of club or organization: Not on file     Attends meetings of clubs or organizations: Not on file     Relationship status: Not on file    Intimate partner violence     Fear of current or ex partner: Not on file     Emotionally abused: Not on file     Physically abused: Not on file     Forced sexual activity: Not on file   Other Topics Concern    Not on file   Social History Narrative    ** Merged History Encounter **         ** Merged History Encounter **          Current Facility-Administered Medications   Medication Dose Route Frequency Provider Last Rate Last Admin    sterile water injection             vancomycin 1000 mg IVPB in 250 mL D5W addavial  15 mg/kg Intravenous Once Ricardo Can MD        cefepime (MAXIPIME) 2 g IVPB minibag  2 g Intravenous Once Ricardo Can MD        acetaminophen (TYLENOL) suppository 650 mg  650 mg Rectal Once Ricardo Can MD        levetiracetam (KEPPRA) 1000 mg/100 mL IVPB  1,000 mg Intravenous Once Ricardo Can MD         Current Outpatient Medications   Medication Sig Dispense Refill    levETIRAcetam (KEPPRA) 750 MG tablet Take 1 tablet by mouth 2 times daily 60 tablet 3    risperiDONE (RISPERDAL) 1 MG tablet Take 1 mg by mouth nightly      folic acid (FOLVITE) 1 Negative mg/dL    Bilirubin Urine Negative Negative    Ketones, Urine Negative Negative mg/dL    Specific Gravity, UA 1.011 1.005 - 1.030    Blood, Urine Negative Negative    pH, UA 7.0 5.0 - 8.0    Protein, UA Negative Negative mg/dL    Urobilinogen, Urine 0.2 <2.0 E.U./dL    Nitrite, Urine Negative Negative    Leukocyte Esterase, Urine Negative Negative    Microscopic Examination Not Indicated     Urine Type Voided     Urine Reflex to Culture Not Indicated    Urine Drug Screen   Result Value Ref Range    Amphetamine Screen, Urine Neg Negative <1000ng/mL    Barbiturate Screen, Ur Neg Negative <200 ng/mL    Benzodiazepine Screen, Urine Neg Negative <200 ng/mL    Cannabinoid Scrn, Ur Neg Negative <50 ng/mL    Cocaine Metabolite Screen, Urine Neg Negative <300 ng/mL    Opiate Scrn, Ur Neg Negative <300 ng/mL    PCP Screen, Urine Neg Negative <25 ng/mL    Methadone Screen, Urine Neg Negative <300 ng/mL    Propoxyphene Scrn, Ur Neg Negative <300 ng/mL    Oxycodone Urine Neg Negative <100 ng/ml    pH, UA 7.0     Drug Screen Comment: see below    CBC auto differential   Result Value Ref Range    WBC 13.5 (H) 4.0 - 11.0 K/uL    RBC 3.91 (L) 4.20 - 5.90 M/uL    Hemoglobin 11.5 (L) 13.5 - 17.5 g/dL    Hematocrit 36.8 (L) 40.5 - 52.5 %    MCV 93.9 80.0 - 100.0 fL    MCH 29.5 26.0 - 34.0 pg    MCHC 31.4 31.0 - 36.0 g/dL    RDW 15.1 12.4 - 15.4 %    Platelets 942 634 - 659 K/uL    MPV 7.6 5.0 - 10.5 fL    Neutrophils % 83.0 %    Lymphocytes % 8.5 %    Monocytes % 8.2 %    Eosinophils % 0.0 %    Basophils % 0.3 %    Neutrophils Absolute 11.2 (H) 1.7 - 7.7 K/uL    Lymphocytes Absolute 1.1 1.0 - 5.1 K/uL    Monocytes Absolute 1.1 0.0 - 1.3 K/uL    Eosinophils Absolute 0.0 0.0 - 0.6 K/uL    Basophils Absolute 0.0 0.0 - 0.2 K/uL   Comprehensive Metabolic Panel w/ Reflex to MG   Result Value Ref Range    Sodium 140 136 - 145 mmol/L    Potassium reflex Magnesium 3.9 3.5 - 5.1 mmol/L    Chloride 100 99 - 110 mmol/L    CO2 25 21 - 32 mmol/L    Anion Gap 15 3 - 16    Glucose 126 (H) 70 - 99 mg/dL    BUN 7 7 - 20 mg/dL    CREATININE 0.8 (L) 0.9 - 1.3 mg/dL    GFR Non-African American >60 >60    GFR African American >60 >60    Calcium 9.2 8.3 - 10.6 mg/dL    Total Protein 9.4 (H) 6.4 - 8.2 g/dL    Alb 2.9 (L) 3.4 - 5.0 g/dL    Albumin/Globulin Ratio 0.4 (L) 1.1 - 2.2    Total Bilirubin 1.2 (H) 0.0 - 1.0 mg/dL    Alkaline Phosphatase 106 40 - 129 U/L    ALT 14 10 - 40 U/L    AST 48 (H) 15 - 37 U/L    Globulin 6.5 g/dL   Troponin   Result Value Ref Range    Troponin <0.01 <0.01 ng/mL   Brain Natriuretic Peptide   Result Value Ref Range    Pro-BNP 1,757 (H) 0 - 124 pg/mL   Lactic Acid, Plasma   Result Value Ref Range    Lactic Acid 6.2 (HH) 0.4 - 2.0 mmol/L   Levetiracetam level   Result Value Ref Range    KEPPRA Dose Amt Unknown    Ethanol   Result Value Ref Range    Ethanol Lvl None Detected mg/dL   CK   Result Value Ref Range    Total  39 - 308 U/L   Lipase   Result Value Ref Range    Lipase 52.0 13.0 - 60.0 U/L   Acetaminophen level   Result Value Ref Range    Acetaminophen Level <5 (L) 10 - 30 ug/mL   Salicylate   Result Value Ref Range    Salicylate, Serum <9.3 (L) 15.0 - 30.0 mg/dL   Ammonia   Result Value Ref Range    Ammonia 67 (H) 16 - 60 umol/L   COVID-19   Result Value Ref Range    SARS-CoV-2, NAAT Not Detected Not Detected     The 12 lead EKG was interpreted by me as follows:  Rate: tachycardia with a rate of 135  Rhythm: sinus  Axis: normal  Intervals: normal MS, narrow QRS, normal QTc  ST segments: no ST elevations or depressions  T waves: no abnormal inversions  Non-specific T wave changes: present  Prior EKG comparison: EKG dated 5/15/18 is not significantly different    RADIOLOGY    Ct Head Wo Contrast    Result Date: 12/30/2020  EXAMINATION: CT OF THE HEAD WITHOUT CONTRAST  12/29/2020 11:40 pm TECHNIQUE: CT of the head was performed without the administration of intravenous contrast. Dose modulation, iterative reconstruction, and/or weight based adjustment of the mA/kV was utilized to reduce the radiation dose to as low as reasonably achievable. COMPARISON: 05/15/2018 HISTORY: ORDERING SYSTEM PROVIDED HISTORY: fever ams seizures TECHNOLOGIST PROVIDED HISTORY: Reason for exam:->fever ams seizures Has a \"code stroke\" or \"stroke alert\" been called? ->No Reason for Exam: Seizures (pt had three witnesses seizures per squad. pt with hx of seizures. pt post ictal upon squad arrival and upon pt arrival to ER. pt appears confused and restless. ) Acuity: Acute Type of Exam: Initial FINDINGS: BRAIN/VENTRICLES: There is mild generalized atrophy and there is moderate patchy periventricular and subcortical white matter low attenuation that is nonspecific but most consistent with chronic small vessel ischemia. There is no acute intracranial hemorrhage, mass effect or midline shift. No abnormal extra-axial fluid collection. The gray-white differentiation is maintained without evidence of an acute infarct. There is no evidence of hydrocephalus. ORBITS: The visualized portion of the orbits demonstrate no acute abnormality. SINUSES: The visualized paranasal sinuses and mastoid air cells demonstrate no acute abnormality. SOFT TISSUES/SKULL:  No acute abnormality of the visualized skull or soft tissues. No acute intracranial abnormality. Moderate white matter disease, likely chronic small vessel ischemia. Xr Chest Portable    Result Date: 12/30/2020  EXAMINATION: ONE XRAY VIEW OF THE CHEST 12/29/2020 11:46 pm COMPARISON: 05/15/2018. HISTORY: ORDERING SYSTEM PROVIDED HISTORY: fever ams TECHNOLOGIST PROVIDED HISTORY: Reason for exam:->fever ams Reason for Exam: fever/AMS Acuity: Acute Type of Exam: Initial FINDINGS: The cardiac silhouette appears within normal limits. There is redemonstration of scarring/atelectatic changes seen affecting the lateral aspect of the right upper to mid lung field.   Scarring/possible bullous changes of the left lung apex is redemonstrated. Additional scarring/atelectatic changes are seen at the right lung base. No confluent airspace opacity, large pleural effusion or pneumothorax is seen. There is redemonstration of chronic appearing right rib deformities. Scarring/atelectatic changes seen overlying the lateral aspect of the right mid to lower lung field and right lung base. Possible scarring and bullous changes of the left lung apex is redemonstrated. No confluent airspace opacity or pleural effusion is seen. ED COURSE/MDM  Patient seen and evaluated. Old records reviewed. Labs and imaging reviewed and results discussed with patient. After initial evaluation, differential diagnostic considerations included: stroke, TIA, intracranial bleed, trauma, infection/sepsis, medication side effect, intoxication/withdrawal, metabolic/electrolyte abnormalities    The patient's ED workup was notable for multifactorial encephalopathy, initially thought may have been postictal given reports of seizures at home. He has had no seizures here. He was loaded with Keppra. He was able to tell me that he had no headache or neck stiffness, he was also able to tell me that he drank alcohol and marijuana however his toxicologic evaluation here is essentially negative. He was given Tylenol for fever and initiated broad-spectrum antibiotics for sepsis of unknown source. He was continually agitated in the ED despite multiple rounds of chemical sedation and now is in soft point restraints because he continues to try to pull out his IV and remains encephalopathic. CT of the head is unremarkable. Chest x-ray shows some scarring and atelectatic changes however he has had a cough on exam and so Covid is currently swabbed and pending at this time.   Currently based on his mental status, lumbar puncture would be essentially impossible given the amount of chemical sedation he is required and now is in restraints-LP would not be safe to dressing. A post procedure X-ray was not indicated. The patient tolerated the procedure well. Complications: None    Ultrasound guided central venous access  Indication: need for central access as documented above  Views:  Long access view of target vein: Adequate  Short access view of target vein: Adequate  Adjacent artery: Adequate    Images obtained with findings:   Vein compressible: yes  Needle tip within vein: yes    Impression:   Successful cannulation of central vein: yes     Images obtained by [facility], interpreted by [facility]. During the patient's ED course, the patient was given:  Medications   sterile water injection (has no administration in time range)   vancomycin 1000 mg IVPB in 250 mL D5W addavial (has no administration in time range)   cefepime (MAXIPIME) 2 g IVPB minibag (has no administration in time range)   acetaminophen (TYLENOL) suppository 650 mg (has no administration in time range)   levetiracetam (KEPPRA) 1000 mg/100 mL IVPB (has no administration in time range)   0.9 % sodium chloride infusion (2,040 mLs Intravenous New Bag 12/30/20 0132)   sodium chloride 0.9 % 8,536 mL with folic acid 1 mg, adult multi-vitamin with vitamin k 10 mL, thiamine 100 mg ( Intravenous New Bag 12/30/20 0132)   LORazepam (ATIVAN) injection 2 mg (2 mg Intravenous Given 12/29/20 2335)   LORazepam (ATIVAN) injection 1 mg (1 mg Intravenous Given 12/30/20 0240)   haloperidol lactate (HALDOL) injection 5 mg (5 mg Intramuscular Given 12/30/20 0000)   diphenhydrAMINE (BENADRYL) injection 50 mg (50 mg Intramuscular Given 12/30/20 0000)   ziprasidone (GEODON) injection 20 mg (20 mg Intramuscular Given 12/30/20 0055)        CLINICAL IMPRESSION  1. Acute encephalopathy    2. Seizures ([facility] [address]    3. Acute febrile illness        Blood pressure 138/77, pulse 146, temperature 101.8 °F (38.8 °C), temperature source Oral, resp. rate 20, weight 150 lb (68 kg), SpO2 96 %.     Adrian Husbands CHARLINEula December was admitted in fair condition. The plan is to admit to the hospital at this time under the hospitalist service. Dr. Amada Angelucci accepted the patient and will take over the patient's care. The total critical care time spent while evaluating and treating this patient was 40 minutes. This excludes time spent doing separately billable procedures. This includes time at the bedside, data interpretation, medication management, obtaining critical history from collateral sources if the patient is unable to provide it directly, and physician consultation. Specifics of interventions taken and potentially life-threatening diagnostic considerations are listed above in the medical decision making. DISCLAIMER: This chart was created using Dragon dictation software. Efforts were made by me to ensure accuracy, however some errors may be present due to limitations of this technology and occasionally words are not transcribed correctly.         Brant Varghese MD  12/30/20 1529       Brant Varghese MD  12/30/20 9866

## 2020-12-30 NOTE — CONSULTS
Infectious Diseases   Consult Note        Admission Date: 12/29/2020  Hospital Day: Hospital Day: 2   Attending: Lynne Dixon MD  Date of service: 12/30/20     Reason for admission: Seizure [facility]) [R56.9]    Chief complaint/ Reason for consult: Severe sepsis    Microbiology:        I have reviewed allavailable micro lab data and cultures    · Blood culture (2/2) - collected on 12/29/2020: In process      Antibiotics and immunizations:       Current antibiotics: All antibiotics and their doses were reviewed by me    Recent Abx Admin                   metronidazole (FLAGYL) 500 mg in NaCl 100 mL IVPB premix (mg) 500 mg New Bag 12/30/20 1230     500 mg New Bag  0708    cefepime (MAXIPIME) 2 g IVPB minibag (g) 2 g New Bag 12/30/20 0517    vancomycin 1000 mg IVPB in 250 mL D5W addavial (mg) 1,000 mg New Bag 12/30/20 0517                  Immunization History: All immunization history was reviewed by me today. There is no immunization history on file for this patient. Known drug allergies: All allergies were reviewed and updated    Allergies   Allergen Reactions    Carbamazepine Other (See Comments)     Thrombocytopenia    Codeine Itching     Per ER    Phenytoin Itching    Sulfa Antibiotics      Per ER       Social history:     Social History:  All social andepidemiologic history was reviewed and updated by me today as needed. · Tobacco use:   reports that he has been smoking. He has a 40.00 pack-year smoking history. He has never used smokeless tobacco.  · Alcohol use:   reports current alcohol use of about 2.0 standard drinks of alcohol per week. · Currently lives in: [place] [id number]  ·  reports current drug use. Drug: Marijuana. Assessment:     The patient is a 60 y.o. old male who  has a past medical history of Alcohol abuse and Seizures ([facility] [address]  with following problems:    · Sepsis with high fever, bandemia, metabolic encephalopathy, hypotension  · Severe lactic acidosis with serum lactate of 6.2 on admission  · Right lower lobe pneumonia  · Postictal state on presentation  · Negative urine tox screen  · History of marijuana abuse  · Chronic small vessel ischemia  · History of alcohol abuse  · Smoker      Discussion: This is a 78-year-old man with history of alcohol abuse, admitted with altered mental state, high fever of 101.8 and elevated white cell count of 13,500 in setting of recent seizure episodes. Compliance with Keppra at home is unknown    I reviewed his chest x-ray from admission. It shows a right lower lobe infiltrate. He had an EEG done today which showed generalized diffuse slowing indicating mild to moderate diffuse encephalopathy. He has also been running hypotensive. Rapid COVID-19 test negative. COVID-19 PCR has not been done    Urinalysis on admission was unremarkable    Plan:     Diagnostic Workup:    · Will order 2 sets of blood cultures today  · Will order respiratory viral PCR panel with COVID-19 PCR  · Urine Legionella and pneumococcal antigen  · Nasal MRSA probe  · Continue to follow fever curve, WBC count and blood cultures  · Follow up on liverand renal functions closely  · Repeat a portable chest x-ray tomorrow    Antimicrobials:    · Will continue empiric broad-spectrum antibiotics for now  · Continue IV vancomycin with target vancomycin trough level of 15-20  Check Vancomycin trough before the 5th dose. Keep vancomycin trough below 20 at all times. Avoid increasing the dose of vancomycin above a total of 4 grams in a 24-hour period in patients younger than 45 years and above 3 grams in a 24-hour period in a patient of age 39 years or older. Continue to monitor serum creatinine and Vanco levels closely, while the patient is on I/v Vancomycin.    · Continue IV ceftriaxone 2 g every 24 hour  · Will continue IV Flagyl 500 mg every 8 hours*  · We will follow up on the culture results and clinical progress and will make further recommendations accordingly. · Continue close vitals monitoring. · Maintain good glycemic control. · Fall precautions. Aspiration precautions. · Continue to watch for new fever or diarrhea. · DVT prophylaxis. · Discussed all above with RN. Drug Monitoring:    · Continue serial monitoring for antibiotic toxicity as follows: Vancomycin trough, CBC, CMP*  · Continue to watch for following: new or worsening fever, hypotension, hives, lip swelling and redness or purulence at vascular access sites. I/v access Management:    · Continue to monitor i.v access sites for erythema, induration, discharge or tenderness. · As always, continue efforts to minimizetubes/lines/drains as clinically appropriate to reduce chances of line associated infections. Current isolation precautions:    Currently active isolation(s): Droplet Plus       Level of complexity of consult: High     Risk of Complications/Morbidity: High     · Illness(es)/ Infection present that pose threat to life/bodily function. · There is potential for severe exacerbation of infection/side effects of treatment. · Therapy requires intensive monitoring for antimicrobial agent toxicity. Thank you for involving me in the care of your patient. I will continue to follow. If you have any additional questions, please do not hesitate to contact me. Subjective:     Presenting complaint in ER:     Chief Complaint   Patient presents with    Seizures     pt had three witnesses seizures per squad. pt with hx of seizures. pt post ictal upon squad arrival and upon pt arrival to ER. pt appears confused and restless. HPI: Marielle Nolasco is a 60 y.o. male patient, who was seen at the request of Dr. Cristina Rosa MD.    History was obtained from chart review and RN as patient is encephalopathic    The patient was admitted on 12/29/2020. I have been consulted to see the patient for above mentioned reason(s).     The patient has multiple medical comorbidities, and presented to the ER for 3 episodes of witnessed seizures and altered mental state. The patient is history of seizure disorder and has been on Keppra in the past.  Recent compliance with Keppra unknown. In the ER, the patient was confused and a tachycardic and was noted to have a fever of 101.8. White cell count was 13,500. He was admitted. Blood cultures have been sent. I have been asked for my opinion for management for this patient. Past Medical History: All past medical history reviewed today. Past Medical History:   Diagnosis Date    Alcohol abuse     Seizures ([facility] [address]          Past Surgical History: All pastsurgical history was reviewed today. History reviewed. No pertinent surgical history. Family History: All family history was reviewed today. History reviewed. No pertinent family history. Medications: All current and past medications were reviewed. Not in a hospital admission.  sodium chloride flush  10 mL Intravenous 2 times per day    enoxaparin  40 mg Subcutaneous Daily    famotidine (PEPCID) injection  20 mg Intravenous BID    levetiracetam  750 mg Intravenous Q12H    [START ON 12/31/2020] cefTRIAXone (ROCEPHIN) IV  2 g Intravenous Q24H    metroNIDAZOLE  500 mg Intravenous Q6H    thiamine  100 mg Oral Daily    multivitamin  1 tablet Oral Daily    vancomycin  1,000 mg Intravenous Q12H          REVIEW OF SYSTEMS:       Review of Systems   Unable to perform ROS: Mental status change         Objective:       PHYSICAL EXAM:      Vitals:   Vitals:    12/30/20 1430 12/30/20 1500 12/30/20 1545 12/30/20 1630   BP: 99/65 102/65 112/66 128/75   Pulse: 76 76 79 78   Resp: 18 18 21 19   Temp:       TempSrc:       SpO2: 100% 100% 99% 99%   Weight:       Height:           Physical Exam      PHYSICAL EXAM:     In-person bedside physical examination deferred.   Pursuant to the emergency declaration under the [address] and the National Emergencies Act, [address] [facility] waiver authority and the "[employer] and [employer] Act, this clinical encounter was conducted to provide necessary medical care. (Also consistent with new provisions and guidance offered by [facility] on March 18, 2020 in setting of COVID 19 outbreak and in order to preserve personal protective equipment in accordance with the flexibilities announced by CMS on March 30, 2020)   References: https://[facility]. [facility]/Portals/0/Resources/COVID-19/3_18%20Telemed%20Guidance%20Updated%20March%2018. pdf?ecl=[email or web address]                      https://[facility]. [facility]/Portals/0/Resources/COVID-19/3_18%20Telemed%20Guidance%20Updated%20March%2018. pdf?imr=[email or web address]                      http://[employer]/. pdf                            General: encephalopathic per primary service, vitals reviewed  HEENT: deferred  Cardiovascular:Telemetry data reviewed, rest deferred   Pulmonary: deferred  Abdomen/GI: deferred  Neuro: encephalopathic per primary service  Skin: deferred  Musculoskeletal:  deferred  Genitourinary: Deferred  Psych: deferred  Lymphatic/Immunologic: deferred          Intake and output:     No intake/output data recorded. Lab Data:   All available labs were reviewed by me today.      CBC:   Recent Labs     12/29/20  2350   WBC 13.5*   RBC 3.91*   HGB 11.5*   HCT 36.8*      MCV 93.9   MCH 29.5   MCHC 31.4   RDW 15.1        BMP:  Recent Labs     12/29/20  2350      K 3.9      CO2 25   BUN 7   CREATININE 0.8*   CALCIUM 9.2   GLUCOSE 126*        Hepatic FunctionPanel:   Lab Results   Component Value Date    ALKPHOS 106 12/29/2020    ALT 14 12/29/2020    AST 48 12/29/2020    PROT 9.4 12/29/2020    BILITOT 1.2 12/29/2020    BILIDIR 1.3 05/16/2018    IBILI 0.7 05/16/2018    LABALBU 2.9 12/29/2020       CPK:   Lab Results   Component Value Date    CKTOTAL contact me through [employer] in [employer] or at the phone number provided below with my electronic signature. Any pictures or media included in this note were obtained after taking informed verbal consent from the patient and with their approval to include those in the patient's medical record.       Charisma Aviles MD, MPH  12/30/20, 4:36 PM [facility] Infectious Disease   [address] [place] [address], [address] [place] [address]  Office: [phone]  Fax: [phone]  Clinic days:  Tuesday & Thursday

## 2020-12-30 NOTE — PROCEDURES
Patient: Yaquelin Kolb    MR Number: [id number]  YOB: 1965  Date of Visit: 12/30/2020    Clinical History:  The patient is a 60y.o. years old male with acute encephalopathy and possible recurrent seizures. Medications reviewed. Method: The EEG was performed utilizing the international 10/20 of electrode placements of both referential and bipolar montages. The patient was awake and drowsy through out the recording. Photic stimulation was performed. Findings: The background of the EEG showed generalized diffuse slowing in the mixture of mixture of delta and theta superimposed on faster beta activity. The slowing was waxing and waning, nonrhythmic and symmetric. No spike or sharp waves could be seen. Posterior background was somewhat poorly organized. Photic stimulation did not activate EEG. Impression: This EEG is abnormal.  The generalized diffuse slowing is suggestive of mild to moderate diffuse encephalopathy. There is no evidence of epileptiform discharges, focal, or lateralizing abnormalities.       Thuy Jasso MD      Board certified in clinical neurophysiology

## 2020-12-30 NOTE — ED NOTES
Bed: 02  Expected date:   Expected time:   Means of arrival:   Comments:  julio Ruiz RN  12/29/20 0091

## 2020-12-30 NOTE — ED NOTES
Bed: 10  Expected date:   Expected time:   Means of arrival:   Comments:  EMS     Trevor Vargas RN  12/29/20 5042

## 2020-12-30 NOTE — CARE COORDINATION
SW attempted to meet with pt to complete initial assessment. Pt was sleeping and not aroused by name. Pt came to ED for Acute encephalopathy and seizures. Pt has hx of alcohol abuse. May need ETOH resources. Also may not be med compliant with Kepra medication for seizures. SW may need to inquire about this need. Will attempt assessment at a later time.     Electronically signed by JEAN Armstrong LSW on 12/30/2020 at 4:41 PM

## 2020-12-30 NOTE — H&P
and acute issues as mentioned below. Currently, on my evaluation, patient is :   · Since arrival, post above Rx, patient is Altered   · Now is s/p multiple medications given for AMS and agitation in ED   · Pulled PIVs   · Now is s/p Rt Femoral CVC placed in ED   · Unable to provide history 2/2 lethargy and drowsiness s/p medications in ED       REVIEW OF SYSTEMS:      Attempted but could not be completed d/t AMS        Past Medical History:         has a past medical history of Alcohol abuse and Seizures ([facility] [address] Past Surgical History:         has no past surgical history on file. Medications:        Current Outpatient Medications on File Prior to Encounter   Medication Sig Dispense Refill    levETIRAcetam (KEPPRA) 750 MG tablet Take 1 tablet by mouth 2 times daily 60 tablet 3    risperiDONE (RISPERDAL) 1 MG tablet Take 1 mg by mouth nightly      folic acid (FOLVITE) 1 MG tablet Take 1 tablet by mouth daily 30 tablet 3    Multiple Vitamin (MULTIVITAMIN) tablet Take 1 tablet by mouth daily 30 tablet 3    vitamin B-1 100 MG tablet Take 1 tablet by mouth daily 30 tablet 3         Allergies: Allergies   Allergen Reactions    Carbamazepine Other (See Comments)     Thrombocytopenia    Codeine Itching     Per ER    Phenytoin Itching    Sulfa Antibiotics      Per ER          Social History:   reports that he has been smoking. He has a 40.00 pack-year smoking history. He has never used smokeless tobacco. He reports current alcohol use of about 2.0 standard drinks of alcohol per week. He reports current drug use. Drug: Marijuana. Family History:          History reviewed. No pertinent family history. Physical Exam:  /77   Pulse 146   Temp 101.8 °F (38.8 °C) (Oral)   Resp 20   Wt 150 lb (68 kg)   SpO2 96%   BMI 20.92 kg/m²     General appearance: Ill appearing . Eyes:  Sclera clear, pupils equal  ENT:  Dry  mucus membranes, Trachea midline.   Cardiovascular: Regular rhythm, normal S1, S2. No edema in lower extremities   Respiratory:  Noted Dec AE B/ L . Gastrointestinal:  Abdomen soft,  non-tender,  not distended,   Musculoskeletal:  No cyanosis in digits, neck supple  Neurology: confused and altered . DUNN X 4       Labs:     Recent Labs     12/29/20  2350   WBC 13.5*   HGB 11.5*   HCT 36.8*        Recent Labs     12/29/20  2350      K 3.9      CO2 25   BUN 7   CREATININE 0.8*   CALCIUM 9.2     Recent Labs     12/29/20  2350   AST 48*   ALT 14   BILITOT 1.2*   ALKPHOS 106     No results for input(s): INR in the last 72 hours. Recent Labs     12/29/20  2350   CKTOTAL 118   TROPONINI <0.01         Urinalysis:      Lab Results   Component Value Date    NITRU Negative 12/29/2020    WBCUA 0 06/25/2015    RBCUA 1 06/25/2015    BLOODU Negative 12/29/2020    SPECGRAV 1.011 12/29/2020    GLUCOSEU Negative 12/29/2020         Radiology:     CXR:   I have reviewed the CXR  Scarring and atelectasis changes in RML and RLL     IMAGING :     CT HEAD WO CONTRAST   Final Result   No acute intracranial abnormality. Moderate white matter disease, likely chronic small vessel ischemia. XR CHEST PORTABLE   Final Result   Scarring/atelectatic changes seen overlying the lateral aspect of the right   mid to lower lung field and right lung base. Possible scarring and bullous   changes of the left lung apex is redemonstrated. No confluent airspace   opacity or pleural effusion is seen.                PROBLEM LIST      Active Hospital Problems    Diagnosis Date Noted    Breakthrough seizure ([facility] [address] [G40.919]     Seizure disorder ([facility] [address] [N22.790]     Seizure ([address] [R56.9] 05/15/2018    Alcohol abuse [F10.10] 05/15/2018    Seizure secondary to subtherapeutic anticonvulsant medication ([address] [R56.9, Z79.899]     Partial symptomatic epilepsy with complex partial seizures, intractable, without status epilepticus ([address] [G40.219]     Noncompliance with medication regimen [Z91.14]  Partial epilepsy with impairment of consciousness, intractable ([facility] [address] [G40.219]     Personal history of noncompliance with medical treatment [Z91.19]        PLAN/ORDERS FOR THIS ADMISSION/HOSPITALIZATION       · AMS ? Acute encephalopathy 2/2 metabolic issues and ? PNA POA . Received/Started Medical Rx in ED [ See Epic orders for details] for agitation and delirium and Psychosis => CT head shows No acute findings or is unremarkable for any acute pathology needing acute interventions, on review. C/w neuro checks while in house . Sedation prn   · Recurrent Sz . 2/2 ?? Non compliance issues w/ medications @ Home . Keppra IV started in ED . EEG in AM . Neuro c/sed . => Further recommendations/Evaluation/Mgt per NEURO   · ETOH Abuse . Counseled. Cardiac monitor. Monitor CIWAs. BDZ Prn. Will start , Thiamine / FA / MVI, per orders. Monitor lytes while IP. Seizure precautions prn. Will start Precedex drip for sedation and Agitation/psychosis and closely observe in ICU   · THC Abuse ? ? · Fever POA ? 2/2 Aspiration PNA vs atelectasis vs Meninginitis related/low suspicion for same  . Started empiric Abx for now . Unsafe to attempt LP in ED 2/2 agitation and encephalopathy . => Further recommendations/Evaluation/Mgt per ID   · Probable aspiration PNA POA . Send urinary antigens for streptococcal antigen and legionella antigen. Send BCx X 2. Culture respiratory to be sent. Encourage Cough and deep breathing. Provide Vibratory PT/Acapella. Encourage IS. => Started rocephin & Flagyl and Vancomycin on admission. Renally adjust dosages if necessary. => Further recommendations/Evaluation/Mgt per ID       ·   Home medications for chronic medical problems reviewed and Held / Resumed / Pertinent changes made [as mentioned above] in home medications, as clinically warranted/Indicated .  See EPIC orders for details         · DVT Prophylaxis : Lovenox SQ  ; + SCDs   · Nutrition/Diet: No diet orders on file  · Code Status: Prior  · PT/OT and ambulatory Eval Status: Ambulate with Assist   · Probable LOS & future Disposition planned post discharge  - Home in 2-3 days       Please see EPIC orders for detailed orders/recommendations of plan and medications. CONSULTS ORDERED @ ADMISSION   IP CONSULT TO HOSPITALIST   IP CONSULT TO NEUROLOGY   IP CONSULT TO INFECTIOUS DISEASES   IP CONSULT TO PHARMACY   IP CONSULT TO SOCIAL WORK      Medical Decision Making : HIGH     Total patient Critical [ d/t AMS and fever and PNA  ]   Care [ Direct and Indirect ] time spent in evaluating the patient an discussing plan with appropriate staff/patient/family members is 54 min       Teryr Hughes MD    Hospitalist, [facility].    12/30/2020 3:07 AM

## 2020-12-31 ENCOUNTER — APPOINTMENT (OUTPATIENT)
Dept: GENERAL RADIOLOGY | Age: 55
DRG: 871 | End: 2020-12-31
Payer: MEDICARE

## 2020-12-31 LAB
A/G RATIO: 0.4 (ref 1.1–2.2)
ALBUMIN SERPL-MCNC: 2.1 G/DL (ref 3.4–5)
ALP BLD-CCNC: 85 U/L (ref 40–129)
ALT SERPL-CCNC: 10 U/L (ref 10–40)
ANION GAP SERPL CALCULATED.3IONS-SCNC: 7 MMOL/L (ref 3–16)
AST SERPL-CCNC: 42 U/L (ref 15–37)
BASOPHILS ABSOLUTE: 0 K/UL (ref 0–0.2)
BASOPHILS RELATIVE PERCENT: 0.2 %
BILIRUB SERPL-MCNC: 1.2 MG/DL (ref 0–1)
BUN BLDV-MCNC: 9 MG/DL (ref 7–20)
CALCIUM SERPL-MCNC: 8.2 MG/DL (ref 8.3–10.6)
CHLORIDE BLD-SCNC: 111 MMOL/L (ref 99–110)
CO2: 27 MMOL/L (ref 21–32)
CREAT SERPL-MCNC: 0.7 MG/DL (ref 0.9–1.3)
EOSINOPHILS ABSOLUTE: 0 K/UL (ref 0–0.6)
EOSINOPHILS RELATIVE PERCENT: 0.4 %
GFR AFRICAN AMERICAN: >60
GFR NON-AFRICAN AMERICAN: >60
GLOBULIN: 4.9 G/DL
GLUCOSE BLD-MCNC: 103 MG/DL (ref 70–99)
HCT VFR BLD CALC: 29.8 % (ref 40.5–52.5)
HEMOGLOBIN: 9.4 G/DL (ref 13.5–17.5)
L. PNEUMOPHILA SEROGP 1 UR AG: NORMAL
LYMPHOCYTES ABSOLUTE: 2 K/UL (ref 1–5.1)
LYMPHOCYTES RELATIVE PERCENT: 17.3 %
MAGNESIUM: 1.1 MG/DL (ref 1.8–2.4)
MCH RBC QN AUTO: 29.2 PG (ref 26–34)
MCHC RBC AUTO-ENTMCNC: 31.5 G/DL (ref 31–36)
MCV RBC AUTO: 92.8 FL (ref 80–100)
MONOCYTES ABSOLUTE: 0.8 K/UL (ref 0–1.3)
MONOCYTES RELATIVE PERCENT: 6.7 %
NEUTROPHILS ABSOLUTE: 8.9 K/UL (ref 1.7–7.7)
NEUTROPHILS RELATIVE PERCENT: 75.4 %
PDW BLD-RTO: 15.3 % (ref 12.4–15.4)
PLATELET # BLD: 118 K/UL (ref 135–450)
PMV BLD AUTO: 7.9 FL (ref 5–10.5)
POTASSIUM REFLEX MAGNESIUM: 3.3 MMOL/L (ref 3.5–5.1)
RBC # BLD: 3.21 M/UL (ref 4.2–5.9)
REPORT: NORMAL
SODIUM BLD-SCNC: 145 MMOL/L (ref 136–145)
STREP PNEUMONIAE ANTIGEN, URINE: NORMAL
TOTAL PROTEIN: 7 G/DL (ref 6.4–8.2)
WBC # BLD: 11.8 K/UL (ref 4–11)

## 2020-12-31 PROCEDURE — 87040 BLOOD CULTURE FOR BACTERIA: CPT

## 2020-12-31 PROCEDURE — 92526 ORAL FUNCTION THERAPY: CPT

## 2020-12-31 PROCEDURE — 99233 SBSQ HOSP IP/OBS HIGH 50: CPT | Performed by: INTERNAL MEDICINE

## 2020-12-31 PROCEDURE — 2000000000 HC ICU R&B

## 2020-12-31 PROCEDURE — 0202U NFCT DS 22 TRGT SARS-COV-2: CPT

## 2020-12-31 PROCEDURE — 87641 MR-STAPH DNA AMP PROBE: CPT

## 2020-12-31 PROCEDURE — 6360000002 HC RX W HCPCS: Performed by: HOSPITALIST

## 2020-12-31 PROCEDURE — 2700000000 HC OXYGEN THERAPY PER DAY

## 2020-12-31 PROCEDURE — 99233 SBSQ HOSP IP/OBS HIGH 50: CPT | Performed by: PSYCHIATRY & NEUROLOGY

## 2020-12-31 PROCEDURE — 85025 COMPLETE CBC W/AUTO DIFF WBC: CPT

## 2020-12-31 PROCEDURE — 80053 COMPREHEN METABOLIC PANEL: CPT

## 2020-12-31 PROCEDURE — 2500000003 HC RX 250 WO HCPCS: Performed by: HOSPITALIST

## 2020-12-31 PROCEDURE — 94669 MECHANICAL CHEST WALL OSCILL: CPT

## 2020-12-31 PROCEDURE — 92610 EVALUATE SWALLOWING FUNCTION: CPT

## 2020-12-31 PROCEDURE — 83735 ASSAY OF MAGNESIUM: CPT

## 2020-12-31 PROCEDURE — 71045 X-RAY EXAM CHEST 1 VIEW: CPT

## 2020-12-31 PROCEDURE — 2580000003 HC RX 258: Performed by: HOSPITALIST

## 2020-12-31 RX ORDER — POTASSIUM CHLORIDE 7.45 MG/ML
10 INJECTION INTRAVENOUS PRN
Status: DISCONTINUED | OUTPATIENT
Start: 2020-12-31 | End: 2021-01-05 | Stop reason: HOSPADM

## 2020-12-31 RX ORDER — GAUZE BANDAGE 2" X 2"
100 BANDAGE TOPICAL DAILY
Status: DISCONTINUED | OUTPATIENT
Start: 2020-12-31 | End: 2021-01-05 | Stop reason: HOSPADM

## 2020-12-31 RX ORDER — MAGNESIUM SULFATE 1 G/100ML
1 INJECTION INTRAVENOUS PRN
Status: DISCONTINUED | OUTPATIENT
Start: 2020-12-31 | End: 2021-01-05 | Stop reason: HOSPADM

## 2020-12-31 RX ORDER — MAGNESIUM SULFATE IN WATER 40 MG/ML
4 INJECTION, SOLUTION INTRAVENOUS ONCE
Status: COMPLETED | OUTPATIENT
Start: 2020-12-31 | End: 2020-12-31

## 2020-12-31 RX ADMIN — LEVETIRACETAM 750 MG: 100 INJECTION, SOLUTION INTRAVENOUS at 10:03

## 2020-12-31 RX ADMIN — FAMOTIDINE 20 MG: 10 INJECTION, SOLUTION INTRAVENOUS at 20:01

## 2020-12-31 RX ADMIN — METRONIDAZOLE 500 MG: 500 INJECTION, SOLUTION INTRAVENOUS at 13:06

## 2020-12-31 RX ADMIN — FAMOTIDINE 20 MG: 10 INJECTION, SOLUTION INTRAVENOUS at 10:03

## 2020-12-31 RX ADMIN — Medication 10 ML: at 20:02

## 2020-12-31 RX ADMIN — POTASSIUM CHLORIDE 10 MEQ: 7.46 INJECTION, SOLUTION INTRAVENOUS at 13:06

## 2020-12-31 RX ADMIN — LEVETIRACETAM 750 MG: 100 INJECTION, SOLUTION INTRAVENOUS at 21:14

## 2020-12-31 RX ADMIN — POTASSIUM CHLORIDE 10 MEQ: 7.46 INJECTION, SOLUTION INTRAVENOUS at 06:18

## 2020-12-31 RX ADMIN — VANCOMYCIN HYDROCHLORIDE 1000 MG: 1 INJECTION, POWDER, LYOPHILIZED, FOR SOLUTION INTRAVENOUS at 04:42

## 2020-12-31 RX ADMIN — SODIUM CHLORIDE: 9 INJECTION, SOLUTION INTRAVENOUS at 06:07

## 2020-12-31 RX ADMIN — Medication 10 ML: at 10:08

## 2020-12-31 RX ADMIN — POTASSIUM CHLORIDE 10 MEQ: 7.46 INJECTION, SOLUTION INTRAVENOUS at 07:47

## 2020-12-31 RX ADMIN — CEFTRIAXONE 2 G: 2 INJECTION, POWDER, FOR SOLUTION INTRAMUSCULAR; INTRAVENOUS at 13:33

## 2020-12-31 RX ADMIN — MAGNESIUM SULFATE HEPTAHYDRATE 4 G: 40 INJECTION, SOLUTION INTRAVENOUS at 06:18

## 2020-12-31 RX ADMIN — ENOXAPARIN SODIUM 40 MG: 40 INJECTION SUBCUTANEOUS at 10:03

## 2020-12-31 RX ADMIN — METRONIDAZOLE 500 MG: 500 INJECTION, SOLUTION INTRAVENOUS at 06:04

## 2020-12-31 RX ADMIN — POTASSIUM CHLORIDE 10 MEQ: 7.46 INJECTION, SOLUTION INTRAVENOUS at 10:01

## 2020-12-31 ASSESSMENT — PAIN SCALES - WONG BAKER
WONGBAKER_NUMERICALRESPONSE: 0

## 2020-12-31 ASSESSMENT — PAIN SCALES - GENERAL
PAINLEVEL_OUTOF10: 0

## 2020-12-31 NOTE — PROGRESS NOTES
Infectious Diseases   Progress Note      Admission Date: 12/29/2020  Hospital Day: Hospital Day: 3   Attending: Gloria Gale MD  Date of service: 12/31/2020     Chief complaint/ Reason for consult:     · Sepsis with high fever, bandemia, metabolic encephalopathy, hypotension  · Severe lactic acidosis with serum lactate of 6.2 on admission  · Streptococcus viridans bacteremia  · Right lower lobe pneumonia  · Postictal state on presentation  · Negative urine tox screen    Microbiology:        I have reviewed allavailable micro lab data and cultures    Blood culture (2/2) - collected on 12/29/2020: Streptococcus viridans    Antibiotics and immunizations:       Current antibiotics: All antibiotics and their doses were reviewed by me    Recent Abx Admin                   cefTRIAXone (ROCEPHIN) 2 g IVPB in D5W 50ml minibag (g) 2 g New Bag 12/31/20 1333    metronidazole (FLAGYL) 500 mg in NaCl 100 mL IVPB premix (mg) 500 mg New Bag 12/31/20 1306     500 mg New Bag  0604     500 mg New Bag 12/30/20 2254    vancomycin 1000 mg IVPB in 250 mL D5W addavial (mg) 1,000 mg New Bag 12/31/20 0442     1,000 mg New Bag 12/30/20 2135                  Immunization History: All immunization history was reviewed by me today. There is no immunization history on file for this patient. Known drug allergies: All allergies were reviewed and updated    Allergies   Allergen Reactions    Carbamazepine Other (See Comments)     Thrombocytopenia    Codeine Itching     Per ER    Phenytoin Itching    Sulfa Antibiotics      Per ER       Social history:     Social History:  All social andepidemiologic history was reviewed and updated by me today as needed. · Tobacco use:   reports that he has been smoking. He has a 40.00 pack-year smoking history. He has never used smokeless tobacco.  · Alcohol use:   reports current alcohol use of about 2.0 standard drinks of alcohol per week.   · Currently lives in: [address] [place] [address]  · 136/67 121/67 135/70 130/64   Pulse: 83 84 94 87   Resp: 21 21 19 26   Temp:       TempSrc:       SpO2:  96%     Weight:       Height:           Physical Exam  ''    PHYSICAL EXAM:     In-person bedside physical examination deferred. Pursuant to the emergency declaration under the [facility], [address] [place] [address] and the [employer] Act, this clinical encounter was conducted to provide necessary medical care. (Also consistent with new provisions and guidance offered by [facility] on March 18, 2020 in setting of COVID 19 outbreak and in order to minimize exposures in accordance with the flexibilities announced by [facility] on March 30, 2020). In accordance with the guidelines issued by CMS during the public health emergency, two way communication was conducted directly with the patient with the help of tele-health equipment. References: https://[facility]. [facility]/Portals/0/Resources/COVID-19/3_18%20Telemed%20Guidance%20Updated%20March%2018. pdf?scg=[email or web address]                      https://[facility]. [facility]/Portals/0/Resources/COVID-19/3_18%20Telemed%20Guidance%20Updated%20March%2018. pdf?rmj=[email or web address]                      http://[employer]/. pdf                            General: encephalopathic, vitals reviewed  HEENT: deferred  Cardiovascular:Telemetry data reviewed, rest deferred   Pulmonary: deferred  Abdomen/GI: deferred  Neuro: encephalopathic per primary service  Skin: deferred  Musculoskeletal:  deferred  Genitourinary: Deferred  Psych: deferred  Lymphatic/Immunologic: deferred          Intake and output:    I/O last 3 completed shifts:  In: -   Out: [address] [Urine:4050]    Lab Data:   All available labs and old records have been reviewed by me.     CBC:  Recent Labs     12/29/20  2350 12/31/20  0450   WBC 13.5* 11.8*   RBC 3.91* 3.21*   HGB 11.5* 9.4*   HCT 36.8* 29.8*    118*   MCV 93.9 92.8   MCH 29.5 29.2   MCHC 31.4 31.5   RDW 15.1 15.3        BMP:  Recent Labs     12/29/20  2350 12/31/20  0450    145   K 3.9 3.3*    111*   CO2 25 27   BUN 7 9   CREATININE 0.8* 0.7*   CALCIUM 9.2 8.2*   GLUCOSE 126* 103*        Hepatic Function Panel:   Lab Results   Component Value Date    ALKPHOS 85 12/31/2020    ALT 10 12/31/2020    AST 42 12/31/2020    PROT 7.0 12/31/2020    BILITOT 1.2 12/31/2020    BILIDIR 1.3 05/16/2018    IBILI 0.7 05/16/2018    LABALBU 2.1 12/31/2020       CPK:   Lab Results   Component Value Date    CKTOTAL 118 12/29/2020     ESR: No results found for: SEDRATE  CRP: No results found for: CRP        Imaging: All pertinent images and reports for the current visit were reviewed by me during this visit. XR CHEST PORTABLE   Final Result   Slightly increased reticular opacities suggest vascular congestion/pulmonary   edema. This may be superimposed upon chronic interstitial change. Small   right pleural effusion with asymmetric airspace change at the right base that   may represent atelectasis or edema. Underlying pneumonitis cannot be   excluded. CT HEAD WO CONTRAST   Final Result   No acute intracranial abnormality. Moderate white matter disease, likely chronic small vessel ischemia. XR CHEST PORTABLE   Final Result   Scarring/atelectatic changes seen overlying the lateral aspect of the right   mid to lower lung field and right lung base. Possible scarring and bullous   changes of the left lung apex is redemonstrated. No confluent airspace   opacity or pleural effusion is seen. Medications: All current and past medications were reviewed.      thiamine mononitrate  100 mg Oral Daily    sodium chloride flush  10 mL Intravenous 2 times per day    enoxaparin  40 mg Subcutaneous Daily    famotidine (PEPCID) injection  20 mg Intravenous BID    levetiracetam  750 mg Intravenous Q12H    cefTRIAXone (ROCEPHIN) IV  2 g Intravenous Q24H    multivitamin  1 tablet Oral Daily        sodium chloride 125 mL/hr at 12/31/20 0607    dexmedetomidine 0.2 mcg/kg/hr (12/30/20 0440)       magnesium sulfate, potassium chloride, sodium chloride flush, promethazine **OR** ondansetron, polyethylene glycol, acetaminophen **OR** acetaminophen, potassium chloride **OR** potassium alternative oral replacement **OR** potassium chloride, LORazepam, benzonatate, LORazepam **OR** LORazepam **OR** LORazepam **OR** LORazepam **OR** LORazepam **OR** LORazepam **OR** LORazepam **OR** LORazepam      Problem list:       Patient Active Problem List   Diagnosis Code    Seizure secondary to subtherapeutic anticonvulsant medication ([facility] [address] [address] R56.9, Z79.899    Partial symptomatic epilepsy with complex partial seizures, intractable, without status epilepticus ([facility] [address] G40.219    Noncompliance with medication regimen Z91.14    Partial epilepsy with impairment of consciousness, intractable ([facility] [address] [facility] G40.219    Personal history of noncompliance with medical treatment Z91.19    Seizures ([facility] [address] R56.9    Alcohol abuse F10.10    Pneumonia J18.9    Nausea and vomiting R11.2    Breakthrough seizure ([facility] [address] G40.919    Acute encephalopathy G93.40    Acute febrile illness R50.9    Sepsis ([facility] [address] A41.9    Lactic acid acidosis E87.2    Smoker F17.200    Cerebrovascular small vessel disease I67.9    Streptococcal bacteremia R78.81, B95.5    Streptococcus viridans infection A49.1       Please note that this chart was generated using Dragon dictation software. Although every effort was made to ensure the accuracy of this automated transcription, some errors in transcription may have occurred inadvertently. If you may need any clarification, please do not hesitate to contact me through [employer] or at the phone number provided below with my electronic signature.   Any pictures or media included in this note were obtained after taking informed verbal consent from the patient and with their approval to include those in the patient's medical record.     Mavis Gooden MD, MPH  12/31/2020 , 5:40 PM   [facility]   [address] [place] [address], [address] [place] [address]  Office: [phone]  Fax: [phone]  Clinic days:  Tuesday & Thursday

## 2020-12-31 NOTE — PROGRESS NOTES
Funmi Sakina  Neurology Follow-up  [facility]    Date of Service: 12/31/2020    Subjective:   CC: Follow up today regarding: Acute encephalopathy    Events noted. Chart and lab reviewed. The patient is currently in the ICU. He is about the same. He is waxing and waning. Able to follow simple direction. Other review of system was limited. ROS : A 10-12 system review could not be obtained due to poor cooperation and confusion.      Family history: Noncontributory    Past Medical History:   Diagnosis Date    Alcohol abuse     Seizures ([address]      Current Facility-Administered Medications   Medication Dose Route Frequency Provider Last Rate Last Admin    thiamine mononitrate tablet 100 mg  100 mg Oral Daily Roderick Tobar MD        magnesium sulfate 1 g in dextrose 5% 100 mL IVPB  1 g Intravenous PRN Latisha Christianson MD        potassium chloride 10 mEq/100 mL IVPB (Peripheral Line)  10 mEq Intravenous PRN Latisha Christianson  mL/hr at 12/31/20 0747 10 mEq at 12/31/20 0747    magnesium sulfate 4 g in 100 mL IVPB premix  4 g Intravenous Once Latisha Christianson MD 25 mL/hr at 12/31/20 0618 4 g at 12/31/20 0618    sodium chloride flush 0.9 % injection 10 mL  10 mL Intravenous 2 times per day Latisha Christianson MD   10 mL at 12/30/20 2137    sodium chloride flush 0.9 % injection 10 mL  10 mL Intravenous PRN Latisha Christianson MD        enoxaparin (LOVENOX) injection 40 mg  40 mg Subcutaneous Daily Roderick Tobar MD   40 mg at 12/30/20 1058    promethazine (PHENERGAN) tablet 12.5 mg  12.5 mg Oral Q6H PRN Roderick Tobar MD        Or    ondansetron (ZOFRAN) injection 4 mg  4 mg Intravenous Q6H PRN Roderick Tobar MD        polyethylene glycol (GLYCOLAX) packet 17 g  17 g Oral Daily PRN Latisha Christianson MD        acetaminophen (TYLENOL) tablet 650 mg  650 mg Oral Q6H PRN Roderick Tobar MD        Or    acetaminophen (TYLENOL) suppository 650 mg  650 mg Rectal PRN Sammy Rangel MD        dexmedetomidine (PRECEDEX) 400 mcg in sodium chloride 0.9 % 100 mL infusion  0.2 mcg/kg/hr Intravenous Continuous Roderick Tobar MD 3.4 mL/hr at 12/30/20 0440 0.2 mcg/kg/hr at 12/30/20 0440    vancomycin 1000 mg IVPB in 250 mL D5W addavial  1,000 mg Intravenous Q12H Sammy Rangel MD   Stopped at 12/31/20 0550     Allergies   Allergen Reactions    Carbamazepine Other (See Comments)     Thrombocytopenia    Codeine Itching     Per ER    Phenytoin Itching    Sulfa Antibiotics      Per ER      reports that he has been smoking. He has a 40.00 pack-year smoking history. He has never used smokeless tobacco. He reports current alcohol use of about 2.0 standard drinks of alcohol per week. He reports current drug use. Drug: Marijuana. Objective:  Constitutional:   Vitals:    12/31/20 0515 12/31/20 0700 12/31/20 0719 12/31/20 0800   BP:  (!) 115/53  (!) 86/38   Pulse: 78 75  70   Resp: 23 20  19   Temp:       TempSrc:       SpO2: 99%  96%    Weight:       Height:           General appearance: Confused   Eye: No icterus. PRRR  Neck: supple  Cardiovascular:  No lower leg edema with good pulsation. Mental Status: The patient is waxing and waning  Poor attention and concentration  Can follow simple direction but not consistently  Unable to assess fund of knowledge, memory or language. Cranial Nerves:   II: Pupils: equal, round, reactive to light  III,IV,VI: No gaze preference. No nystagmus  V: Facial sensation: Grossly unremarkable. Face looks symmetric. Tongue is midline. Unable to assess rest of cranial nerves. Musculoskeletal:  The patient can move all 4 extremities. No apparent focal weakness. Tone: Normal tone. No rigidity. Reflexes: symmetric 2+ in both arms and legs  Planters: flexor bilaterally.   Coordination, sensation and gait cannot be examined due to confusion      Data:  LABS:   Lab Results   Component Value Date     12/31/2020    K 3.3 12/31/2020     12/31/2020    CO2 27 12/31/2020    BUN 9 12/31/2020    CREATININE 0.7 12/31/2020    GFRAA >60 12/31/2020    LABGLOM >60 12/31/2020    GLUCOSE 103 12/31/2020    MG 1.10 12/31/2020    CALCIUM 8.2 12/31/2020     Lab Results   Component Value Date    WBC 11.8 12/31/2020    RBC 3.21 12/31/2020    HGB 9.4 12/31/2020    HCT 29.8 12/31/2020    MCV 92.8 12/31/2020    RDW 15.3 12/31/2020     12/31/2020     Lab Results   Component Value Date    INR 1.06 05/16/2018    PROTIME 12.0 05/16/2018       Neuroimaging and labs were independently reviewed by me  Reviewed notes from different physicians. Impression: No changes compared to yesterday. Acute encephalopathy and new onset seizures. So far unknown cause. Could be related to chronic alcohol abuse, ? History of epilepsy or underlying metabolic encephalopathy. Less likely meningoencephalitis as this point although will reconsider LP if he spiked another fever. Chronic alcohol abuse  History of seizures  Smoker  Rule out sepsis      Recommendation  Continue current supportive care  Follow urine and blood cultures  Continue droplet isolation  Continue Keppra 750 mg twice daily  Continue antibiotics, ID is following  Follow LFT and ammonia level  Aspiration precautions  DT precautions  DVT and GI prophylaxis  Repeat COVID-19 PCR  Telemetry  Nicotine patch  We will follow  MDM: High. Andressa Nova MD   [phone]      This dictation was generated by voice recognition computer software. Although all attempts are made to edit the dictation for accuracy, there may be errors in the transcription that are not intended.

## 2020-12-31 NOTE — PROGRESS NOTES
Restraint order expires at 0200. Message sent to hospitalist for new order. Will continue to monitor.

## 2020-12-31 NOTE — PROGRESS NOTES
Pt responsive to pain. Pt able to open eyes to verbal questions at time. Pt somnolent at this time. Precedex on hold for now. IVF infusing. Bilateral wrist restraints in place. VSS. Bed alarm activated. Pt in [place] isolation. Will continue to monitor. Call light within reach.

## 2020-12-31 NOTE — PROGRESS NOTES
CLINICAL BEDSIDE SWALLOWING EVALUATION  Speech Therapy Department    Patient Name:  Afshan Mendes  :  1965  Pain level:denies   Medical Diagnosis:   Seizure ([address] [R56.9]    HPI: \"The patient is a 60y.o.  years old male with history of alcohol abuse who was admitted to the hospital from the ER last night with recurrent seizures. Patient had at least 3 seizures according to report last night before admission. Description is unclear but could be generalized motor seizure. Degree was severe and duration was minutes. No triggers. No other associated symptoms. He came to the ED for evaluation where he was agitated and confused. He had a 101 fever in the ED. LP could not be done due to agitation from the patient requiring restraints. The patient was placed on Keppra and empiric antibiotics. Initial work-up in the ED showed unremarkable CT of the head. COVID-19 rapid testing was negative. Unremarkable CMP and white count was elevated at 13. Patient was admitted to the hospital.  Patient now is waxing and waning. Was able to answer simple questions. He denies any pain or headache. Neck is supple. Other review of system was limited at this time. \"  CXR revealed:  Impression   Slightly increased reticular opacities suggest vascular congestion/pulmonary   edema.  This may be superimposed upon chronic interstitial change.  Small   right pleural effusion with asymmetric airspace change at the right base that   may represent atelectasis or edema.  Underlying pneumonitis cannot be   excluded. Treatment Diagnosis:  Dysphagia    Impressions: SLP eval and treat orders received to assess swallow function. RN reported pt had been non responsive upon admission. Pt NPO pending SLP assessment. Chart review reveals mention of dysphagia but no specific SLP notes. Pt is a poor historian but states he has some intermittent difficulty with swallowing. Pt resides in a group home.  Oral mechanism examination revealed adequate strength and ROM with missing dentition. Various consistency trials were provided to assess swallow function. Pt with prolonged/reduced mastication with decreased bolus cohesion. Pharyngeal pooling was suspected with slightly delayed swallow initiation. Post regular solid trials pt did not demonstrate some intermittent throat clearing. This could be indicative of impaired pharyngeal clearing vs penetration/aspiration. Thin liquids revealed no overt clinical s/s of aspiration/penetration. Overall pt seemingly tolerate soft solids and thin liquids. Dietary Recommendations:  Dysphagia II Minced and Moist with thin liquids, meds as tolerated     Strategies: 90 degree positioning with all p.o. intake; small bites/sips; alternate textures through meal; reduce rate of intake    Treatment/Goals: Speech therapy for dysphagia tx 3-5 times per week. ST.) Pt will tolerate recommended diet without s/s of aspiration     Oral motor Exam:  Dentition: missing dentition   Labial/Facial: grossly functional   Lingual: grossly functional   Voice: WFL     Oral Phase:   Reduced mastication  Reduced A-P propulsion  Apparent premature bolus loss to pharynx    Pharyngeal Phase:  Apparent pharyngeal pooling  Delayed swallow initiation  Decreased laryngeal elevation via palpation   Intermittent throat clearing following regular solids     Patient/Family Education:Education, results and recommendations given to the Pt and nurse, who verbalized understanding    Timed Code Treatment: 0 minutes      Total Treatment Time: 30 minutes     Discharge Recommendations: Speech Therapy for Speech/Dysphagia treatment at discharge. If patient discharges prior to next session this note will serve as a discharge summary.       Ugo Andrade, [address]  Speech Language Pathologist

## 2020-12-31 NOTE — PROGRESS NOTES
[facility]    12/31/2020 8:26 AM        Name: Octavio Maddox . Admitted: 12/29/2020  Primary Care Provider: No primary care provider on file. (Tel: None)      Chief Complaint   Patient presents with    Seizures     pt had three witnesses seizures per squad. pt with hx of seizures. pt post ictal upon squad arrival and upon pt arrival to ER. pt appears confused and restless. Brief History: Patient is a 61 yo male with hx seizure, ETOH abuse, tobacco use. He presented to ER per EMS after having multiple seizures. He was confused and agitated in ER requiring restraints, now on Precedex infusion. CT head with no acute abnormality. COVID-19 tested negative. Subjective: Placed on wrist restrains for agitation. Pt altered and unable to answer to questions during my visit today am. Later in the day, more awake and asking for food as per RN. No acute events reported.      Reviewed interval ancillary notes    Current Medications      thiamine mononitrate tablet 100 mg, Daily      magnesium sulfate 1 g in dextrose 5% 100 mL IVPB, PRN      potassium chloride 10 mEq/100 mL IVPB (Peripheral Line), PRN      magnesium sulfate 4 g in 100 mL IVPB premix, Once      sodium chloride flush 0.9 % injection 10 mL, 2 times per day      sodium chloride flush 0.9 % injection 10 mL, PRN      enoxaparin (LOVENOX) injection 40 mg, Daily      promethazine (PHENERGAN) tablet 12.5 mg, Q6H PRN    Or      ondansetron (ZOFRAN) injection 4 mg, Q6H PRN      polyethylene glycol (GLYCOLAX) packet 17 g, Daily PRN      acetaminophen (TYLENOL) tablet 650 mg, Q6H PRN    Or      acetaminophen (TYLENOL) suppository 650 mg, Q6H PRN      0.9 % sodium chloride infusion, Continuous      potassium chloride (KLOR-CON M) extended release tablet 40 mEq, PRN    Or      potassium bicarb-citric acid (EFFER-K) effervescent tablet 40 mEq, PRN    Or      potassium chloride 10 mEq/100 mL IVPB (Peripheral Line), PRN      famotidine (PEPCID) injection 20 mg, BID      levETIRAcetam (KEPPRA) 750 mg in sodium chloride 0.9 % 100 mL IVPB, Q12H      LORazepam (ATIVAN) injection 2 mg, Q4H PRN      cefTRIAXone (ROCEPHIN) 2 g IVPB in D5W 50ml minibag, Q24H      metronidazole (FLAGYL) 500 mg in NaCl 100 mL IVPB premix, Q6H      benzonatate (TESSALON) capsule 100 mg, TID PRN      multivitamin 1 tablet, Daily      LORazepam (ATIVAN) tablet 1 mg, Q1H PRN    Or      LORazepam (ATIVAN) injection 1 mg, Q1H PRN    Or      LORazepam (ATIVAN) tablet 2 mg, Q1H PRN    Or      LORazepam (ATIVAN) injection 2 mg, Q1H PRN    Or      LORazepam (ATIVAN) tablet 3 mg, Q1H PRN    Or      LORazepam (ATIVAN) injection 3 mg, Q1H PRN    Or      LORazepam (ATIVAN) tablet 4 mg, Q1H PRN    Or      LORazepam (ATIVAN) injection 4 mg, Q1H PRN      dexmedetomidine (PRECEDEX) 400 mcg in sodium chloride 0.9 % 100 mL infusion, Continuous      vancomycin 1000 mg IVPB in 250 mL D5W addavial, Q12H        Objective:  BP (!) 115/53   Pulse 75   Temp 97.6 °F (36.4 °C) (Temporal)   Resp 20   Ht 5' 11\" (1.803 m)   Wt 109 lb 12.6 oz (49.8 kg)   SpO2 96%   BMI 15.31 kg/m²     Intake/Output Summary (Last 24 hours) at 12/31/2020 0826  Last data filed at 12/31/2020 3748  Gross per 24 hour   Intake --   Output 4050 ml   Net -4050 ml      Wt Readings from Last 3 Encounters:   12/31/20 109 lb 12.6 oz (49.8 kg)   12/27/19 150 lb (68 kg)   05/17/18 136 lb 3.9 oz (61.8 kg)       General appearance: AAM, on sedation, arousable, altered  Cardiovascular: Sinus rhythm on tele. Regular rhythm, normal S1, S2. No murmur. No edema in lower extremities  Respiratory: Not using accessory muscles. Diminished throughout.  O2 sats high 90s room air   GI: Abdomen soft, not distended, normal bowel sounds  Musculoskeletal: no obvious bony deformities  Neurology: Unable to assess due to AMS  Skin: Warm,

## 2020-12-31 NOTE — PROGRESS NOTES
Pt more alert and able to tell me name//place/time. Pt pulling at line/tube and pulling gown off. Restraints in place. Bed alarm activated. Will continue to monitor. Call light within reach.

## 2020-12-31 NOTE — CARE COORDINATION
[facility] Case Management Discharge Planning Assessment     RN/SW discharge planner met with patient (sister - Elvie Crouch and Jorge Hughes, worker at [facility] )  to discuss reason for admission, current living situation, and potential needs at the time of discharge    Insurance/Demographics Verified: Medicare/[employer]    Current Living Arrangements   [] Apartment [] Condo [] [facility]app [] Homeless [] [employer] [] Shelter   [] AL/IL/SNF           []  LTC     Confirmed w/:   Living w/: [] Alone [] Children [] Parent [] Spouse   Group Home - [facility].  Patient has freedom to come and go from the group home as he chooses. 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 No pay is given out. Champ stated  \". ..will work for beer. \" kind of arrangement. ETOH cannot be consumed while taking   Epilepsy medication. Financial issues that could impact healthcare:  none identified    Discussed and provided facilities of choice if transition to a skilled nursing facility is required at   the time of discharge. Tentative Discharge Planning   [] Acute Rehab [] Home Alone [] Home w/ Family   [] [facility]                                                                  [] [place]   []  RN [] PT [] OT [] SW   [] Hospice [] LTAC/Select   [] SNF /  [] LTC   Back to group home with no needs.      Discussed with patient and/or family that on the day of discharge home tentative time of   discharge will be between 10 AM and noon.      Transportation at the time of discharge: sister    Reyes Tsang, NEALN, [address] [facility] Case Management  [phone]

## 2020-12-31 NOTE — PROGRESS NOTES
4 Eyes Skin Assessment     NAME:  Dimitri Harrison  YOB: 1965  MEDICAL RECORD NUMBER:  [id number]    The patient is being assess for  Admission    I agree that 2 RN's have performed a thorough Head to Toe Skin Assessment on the patient. ALL assessment sites listed below have been assessed. Areas assessed by both nurses:    Head, Face, Ears, Shoulders, Back, Chest, Arms, Elbows, Hands, Sacrum. Buttock, Coccyx, Ischium and Legs. Feet and Heels        Does the Patient have a Wound?  No noted wound(s)       Lon Prevention initiated:  Yes   Wound Care Orders initiated:  NA    Pressure Injury (Stage 3,4, Unstageable, DTI, NWPT, and Complex wounds) if present place consult order under [de-identified] NA    New and Established Ostomies if present place consult order under : NA      Nurse 1 eSignature: Electronically signed by Alli De León RN on 12/30/20 at 8:00 PM EST    **SHARE this note so that the co-signing nurse is able to place an eSignature**    Nurse 2 eSignature: Electronically signed by Edison Garcia RN on 12/30/20 at 8:02 PM EST

## 2021-01-01 LAB
ALBUMIN SERPL-MCNC: 2.3 G/DL (ref 3.4–5)
ALP BLD-CCNC: 92 U/L (ref 40–129)
ALT SERPL-CCNC: 13 U/L (ref 10–40)
ANION GAP SERPL CALCULATED.3IONS-SCNC: 9 MMOL/L (ref 3–16)
AST SERPL-CCNC: 47 U/L (ref 15–37)
BASOPHILS ABSOLUTE: 0 K/UL (ref 0–0.2)
BASOPHILS RELATIVE PERCENT: 0.4 %
BILIRUB SERPL-MCNC: 0.8 MG/DL (ref 0–1)
BILIRUBIN DIRECT: 0.4 MG/DL (ref 0–0.3)
BILIRUBIN, INDIRECT: 0.4 MG/DL (ref 0–1)
BUN BLDV-MCNC: 4 MG/DL (ref 7–20)
CALCIUM SERPL-MCNC: 8 MG/DL (ref 8.3–10.6)
CHLORIDE BLD-SCNC: 101 MMOL/L (ref 99–110)
CO2: 26 MMOL/L (ref 21–32)
CREAT SERPL-MCNC: 0.8 MG/DL (ref 0.9–1.3)
CULTURE, RESPIRATORY: NORMAL
EKG ATRIAL RATE: 135 BPM
EKG DIAGNOSIS: NORMAL
EKG P AXIS: 80 DEGREES
EKG P-R INTERVAL: 118 MS
EKG Q-T INTERVAL: 306 MS
EKG QRS DURATION: 66 MS
EKG QTC CALCULATION (BAZETT): 459 MS
EKG R AXIS: 82 DEGREES
EKG T AXIS: 77 DEGREES
EKG VENTRICULAR RATE: 135 BPM
EOSINOPHILS ABSOLUTE: 0.1 K/UL (ref 0–0.6)
EOSINOPHILS RELATIVE PERCENT: 2.3 %
GFR AFRICAN AMERICAN: >60
GFR NON-AFRICAN AMERICAN: >60
GLUCOSE BLD-MCNC: 177 MG/DL (ref 70–99)
GRAM STAIN RESULT: NORMAL
HCT VFR BLD CALC: 29.9 % (ref 40.5–52.5)
HEMOGLOBIN: 9.6 G/DL (ref 13.5–17.5)
LYMPHOCYTES ABSOLUTE: 1.6 K/UL (ref 1–5.1)
LYMPHOCYTES RELATIVE PERCENT: 29.2 %
MAGNESIUM: 1.6 MG/DL (ref 1.8–2.4)
MCH RBC QN AUTO: 29.6 PG (ref 26–34)
MCHC RBC AUTO-ENTMCNC: 32 G/DL (ref 31–36)
MCV RBC AUTO: 92.7 FL (ref 80–100)
MONOCYTES ABSOLUTE: 0.5 K/UL (ref 0–1.3)
MONOCYTES RELATIVE PERCENT: 8.8 %
MRSA SCREEN RT-PCR: NORMAL
NEUTROPHILS ABSOLUTE: 3.2 K/UL (ref 1.7–7.7)
NEUTROPHILS RELATIVE PERCENT: 59.3 %
PDW BLD-RTO: 15.1 % (ref 12.4–15.4)
PLATELET # BLD: 122 K/UL (ref 135–450)
PMV BLD AUTO: 7.7 FL (ref 5–10.5)
POTASSIUM REFLEX MAGNESIUM: 3.1 MMOL/L (ref 3.5–5.1)
RBC # BLD: 3.22 M/UL (ref 4.2–5.9)
REPORT: NORMAL
RESPIRATORY PANEL PCR: NORMAL
SODIUM BLD-SCNC: 136 MMOL/L (ref 136–145)
TOTAL PROTEIN: 7.4 G/DL (ref 6.4–8.2)
WBC # BLD: 5.3 K/UL (ref 4–11)

## 2021-01-01 PROCEDURE — 93010 ELECTROCARDIOGRAM REPORT: CPT | Performed by: INTERNAL MEDICINE

## 2021-01-01 PROCEDURE — 80076 HEPATIC FUNCTION PANEL: CPT

## 2021-01-01 PROCEDURE — 36592 COLLECT BLOOD FROM PICC: CPT

## 2021-01-01 PROCEDURE — 6370000000 HC RX 637 (ALT 250 FOR IP): Performed by: HOSPITALIST

## 2021-01-01 PROCEDURE — 6360000002 HC RX W HCPCS: Performed by: INTERNAL MEDICINE

## 2021-01-01 PROCEDURE — 83735 ASSAY OF MAGNESIUM: CPT

## 2021-01-01 PROCEDURE — 2000000000 HC ICU R&B

## 2021-01-01 PROCEDURE — 2580000003 HC RX 258: Performed by: HOSPITALIST

## 2021-01-01 PROCEDURE — 6360000002 HC RX W HCPCS: Performed by: HOSPITALIST

## 2021-01-01 PROCEDURE — 85025 COMPLETE CBC W/AUTO DIFF WBC: CPT

## 2021-01-01 PROCEDURE — 93005 ELECTROCARDIOGRAM TRACING: CPT | Performed by: INTERNAL MEDICINE

## 2021-01-01 PROCEDURE — 6370000000 HC RX 637 (ALT 250 FOR IP): Performed by: INTERNAL MEDICINE

## 2021-01-01 PROCEDURE — 2500000003 HC RX 250 WO HCPCS: Performed by: HOSPITALIST

## 2021-01-01 PROCEDURE — 80048 BASIC METABOLIC PNL TOTAL CA: CPT

## 2021-01-01 PROCEDURE — 99232 SBSQ HOSP IP/OBS MODERATE 35: CPT | Performed by: PSYCHIATRY & NEUROLOGY

## 2021-01-01 RX ORDER — POTASSIUM CHLORIDE 29.8 MG/ML
20 INJECTION INTRAVENOUS
Status: COMPLETED | OUTPATIENT
Start: 2021-01-01 | End: 2021-01-01

## 2021-01-01 RX ORDER — MAGNESIUM SULFATE IN WATER 40 MG/ML
2 INJECTION, SOLUTION INTRAVENOUS ONCE
Status: COMPLETED | OUTPATIENT
Start: 2021-01-01 | End: 2021-01-01

## 2021-01-01 RX ORDER — RISPERIDONE 1 MG/1
1 TABLET, FILM COATED ORAL NIGHTLY
Status: DISCONTINUED | OUTPATIENT
Start: 2021-01-01 | End: 2021-01-05 | Stop reason: HOSPADM

## 2021-01-01 RX ADMIN — ENOXAPARIN SODIUM 40 MG: 40 INJECTION SUBCUTANEOUS at 09:28

## 2021-01-01 RX ADMIN — POTASSIUM CHLORIDE 20 MEQ: 29.8 INJECTION, SOLUTION INTRAVENOUS at 13:15

## 2021-01-01 RX ADMIN — FAMOTIDINE 20 MG: 10 INJECTION, SOLUTION INTRAVENOUS at 09:28

## 2021-01-01 RX ADMIN — THERA TABS 1 TABLET: TAB at 09:28

## 2021-01-01 RX ADMIN — Medication 10 ML: at 09:29

## 2021-01-01 RX ADMIN — Medication 100 MG: at 09:32

## 2021-01-01 RX ADMIN — MAGNESIUM SULFATE IN WATER 2 G: 40 INJECTION, SOLUTION INTRAVENOUS at 13:16

## 2021-01-01 RX ADMIN — LEVETIRACETAM 750 MG: 100 INJECTION, SOLUTION INTRAVENOUS at 22:06

## 2021-01-01 RX ADMIN — Medication 30 ML: at 09:29

## 2021-01-01 RX ADMIN — POTASSIUM CHLORIDE 20 MEQ: 29.8 INJECTION, SOLUTION INTRAVENOUS at 11:45

## 2021-01-01 RX ADMIN — SODIUM CHLORIDE: 9 INJECTION, SOLUTION INTRAVENOUS at 05:07

## 2021-01-01 RX ADMIN — METOPROLOL TARTRATE 25 MG: 25 TABLET, FILM COATED ORAL at 22:05

## 2021-01-01 RX ADMIN — RISPERIDONE 1 MG: 1 TABLET ORAL at 22:05

## 2021-01-01 RX ADMIN — CEFTRIAXONE 2 G: 2 INJECTION, POWDER, FOR SOLUTION INTRAMUSCULAR; INTRAVENOUS at 11:45

## 2021-01-01 RX ADMIN — BENZONATATE 100 MG: 100 CAPSULE ORAL at 10:14

## 2021-01-01 RX ADMIN — Medication 10 ML: at 22:06

## 2021-01-01 RX ADMIN — LEVETIRACETAM 750 MG: 100 INJECTION, SOLUTION INTRAVENOUS at 09:30

## 2021-01-01 RX ADMIN — FAMOTIDINE 20 MG: 10 INJECTION, SOLUTION INTRAVENOUS at 22:05

## 2021-01-01 ASSESSMENT — PAIN SCALES - GENERAL
PAINLEVEL_OUTOF10: 0

## 2021-01-01 NOTE — PROGRESS NOTES
Dimitri Tunisian  Neurology Follow-up  [facility] Neurology    Date of Service: 1/1/2021    Subjective:   CC: Follow up today regarding: Acute encephalopathy    Events noted. Chart and lab reviewed. The patient looks much better today. He is awake and alert x3. He denies any headache, focal weakness or numbness or chest pain. Ana historian. Describes history of epilepsy since childhood. Unable to give any more history guarding history of seizures. Other review of system was unremarkable. ROS : A 10-12 system review was obtained which was unremarkable.     Family history: Noncontributory    Past Medical History:   Diagnosis Date    Alcohol abuse     Seizures ([facility] [address]      Current Facility-Administered Medications   Medication Dose Route Frequency Provider Last Rate Last Admin    risperiDONE (RISPERDAL) tablet 1 mg  1 mg Oral Nightly Mary Flores MD        thiamine mononitrate tablet 100 mg  100 mg Oral Daily Roderick Tobar MD        magnesium sulfate 1 g in dextrose 5% 100 mL IVPB  1 g Intravenous PRN Xavi Perez MD        potassium chloride 10 mEq/100 mL IVPB (Peripheral Line)  10 mEq Intravenous PRN Xavi Perez  mL/hr at 12/31/20 1306 10 mEq at 12/31/20 1306    influenza quadrivalent split vaccine (FLUZONE;FLUARIX;FLULAVAL;AFLURIA) injection 0.5 mL  0.5 mL Intramuscular Prior to discharge Mary Flores MD        sodium chloride flush 0.9 % injection 10 mL  10 mL Intravenous 2 times per day Xavi Perez MD   10 mL at 12/31/20 2002    sodium chloride flush 0.9 % injection 10 mL  10 mL Intravenous PRN Roderick Tobar MD        enoxaparin (LOVENOX) injection 40 mg  40 mg Subcutaneous Daily Roderick Tobar MD   40 mg at 12/31/20 1003    promethazine (PHENERGAN) tablet 12.5 mg  12.5 mg Oral Q6H PRN Roderick Tobar MD        Or    ondansetron (ZOFRAN) injection 4 mg  4 mg Intravenous Q6H PRN Xavi Perez MD        polyethylene glycol (GLYCOLAX) packet 17 g  17 g Oral Daily PRN Garnetta Siemens, MD        acetaminophen (TYLENOL) tablet 650 mg  650 mg Oral Q6H PRN Garnetta Siemens, MD        Or    acetaminophen (TYLENOL) suppository 650 mg  650 mg Rectal Q6H PRN Garnetta Siemens, MD        potassium chloride (KLOR-CON M) extended release tablet 40 mEq  40 mEq Oral PRN Garnetta Siemens, MD        Or    potassium bicarb-citric acid (EFFER-K) effervescent tablet 40 mEq  40 mEq Oral PRN Garnetta Siemens, MD        Or    potassium chloride 10 mEq/100 mL IVPB (Peripheral Line)  10 mEq Intravenous PRN Garnetta Siemens, MD        famotidine (PEPCID) injection 20 mg  20 mg Intravenous BID Garnetta Siemens, MD   20 mg at 12/31/20 2001    levETIRAcetam (KEPPRA) 750 mg in sodium chloride 0.9 % 100 mL IVPB  750 mg Intravenous Q12H Garnetta Siemens, MD   Stopped at 12/31/20 2130    LORazepam (ATIVAN) injection 2 mg  2 mg Intravenous Q4H PRN Garnetta Siemens, MD        cefTRIAXone (ROCEPHIN) 2 g IVPB in D5W 50ml minibag  2 g Intravenous Q24H Roderick Tobar MD   Stopped at 12/31/20 1429    benzonatate (TESSALON) capsule 100 mg  100 mg Oral TID PRN Garnetta Siemens, MD        multivitamin 1 tablet  1 tablet Oral Daily Roderick Tobar MD        LORazepam (ATIVAN) tablet 1 mg  1 mg Oral Q1H PRN Garnetta Siemens, MD        Or    LORazepam (ATIVAN) injection 1 mg  1 mg Intravenous Q1H PRN Roderick Tobar MD        Or    LORazepam (ATIVAN) tablet 2 mg  2 mg Oral Q1H PRN Garnetta Siemens, MD        Or    LORazepam (ATIVAN) injection 2 mg  2 mg Intravenous Q1H PRN Roderick Tobar MD        Or    LORazepam (ATIVAN) tablet 3 mg  3 mg Oral Q1H PRN Roderick Tobar MD        Or    LORazepam (ATIVAN) injection 3 mg  3 mg Intravenous Q1H PRN Roderick Tobar MD        Or    LORazepam (ATIVAN) tablet 4 mg  4 mg Oral Q1H PRN Roderick Tobar MD        Or    LORazepam (ATIVAN) injection 4 mg  4 mg Intravenous Q1H PRN Garnetta Siemens, MD Allergies   Allergen Reactions    Carbamazepine Other (See Comments)     Thrombocytopenia    Codeine Itching     Per ER    Phenytoin Itching    Sulfa Antibiotics      Per ER      reports that he has been smoking. He has a 40.00 pack-year smoking history. He has never used smokeless tobacco. He reports current alcohol use of about 2.0 standard drinks of alcohol per week. He reports current drug use. Drug: Marijuana. Objective:  Constitutional:   Vitals:    01/01/21 0345 01/01/21 0400 01/01/21 0500 01/01/21 0800   BP:  131/63 132/68 (!) 119/55   Pulse: 101 97 103 95   Resp: 24 17 21 20   Temp:   99.2 °F (37.3 °C) 99.4 °F (37.4 °C)   TempSrc:   Temporal Temporal   SpO2: 98% 100% 96% 100%   Weight:   105 lb 9.6 oz (47.9 kg)    Height:           General appearance: No acute distress  Eye: No icterus. PRRR  Neck: supple  Cardiovascular:  No lower leg edema with good pulsation. Mental Status:   AAO x2 today  Poor attention but more awake and alert  Language is fluent  Poor vocabulary  Intact remote memory  Poor insight. Cranial Nerves:   II: Pupils: equal, round, reactive to light  III,IV,VI: No gaze preference. No nystagmus  V: Facial sensation: Grossly unremarkable. Face looks symmetric. Tongue is midline. Unable to assess rest of cranial nerves. Musculoskeletal:  The patient can move all 4 extremities. No apparent focal weakness. Tone: Normal tone. No rigidity. Reflexes: symmetric 2+ in both arms and legs  Planters: flexor bilaterally.   Coordination no tremors  Sensation is normal  Gait cannot be tested      Data:  LABS:   Lab Results   Component Value Date     12/31/2020    K 3.3 12/31/2020     12/31/2020    CO2 27 12/31/2020    BUN 9 12/31/2020    CREATININE 0.7 12/31/2020    GFRAA >60 12/31/2020    LABGLOM >60 12/31/2020    GLUCOSE 103 12/31/2020    MG 1.10 12/31/2020    CALCIUM 8.2 12/31/2020     Lab Results   Component Value Date    WBC 11.8 12/31/2020    RBC 3.21
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
## 2021-01-01 NOTE — PROGRESS NOTES
Patient may be at baseline per family (Sister, Valerie Trejo) and caregiver at group home ([facility]). He will state that he is at home and if questioned, he looks around and says hospital.  At his group home, apparently he is able to move around the community unassisted and interact with neighbors/friends. He was able to answer the telephone when it rang and spoke with his sister Valerie Trejo. He was able to repeat safety events which he needed to remember, although he did call the IV lines \"wires\", but remembered that it was important that he not pull or play with them. He did not attempt to remove the ECG leads or his IV or españa. He wanted food. Restraints removed. Will continue to monitor and bed alarm remains in place. Patient was able to move self up in the bed unassisted.

## 2021-01-01 NOTE — PROGRESS NOTES
Pt stated he had chest pain. Heart rhythm not significantly different, but more PACs and heart rate > 100. EKG completed with NSR and no noted anomalies. Pain is coming and going. Discussed indigestion vs chest pain and he said he gets this sometimes after eating. Will continue to monitor. Currently no pain.
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
## 2021-01-02 LAB
ANION GAP SERPL CALCULATED.3IONS-SCNC: 7 MMOL/L (ref 3–16)
BASOPHILS ABSOLUTE: 0 K/UL (ref 0–0.2)
BASOPHILS RELATIVE PERCENT: 0.5 %
BUN BLDV-MCNC: 3 MG/DL (ref 7–20)
CALCIUM SERPL-MCNC: 8.2 MG/DL (ref 8.3–10.6)
CHLORIDE BLD-SCNC: 106 MMOL/L (ref 99–110)
CO2: 27 MMOL/L (ref 21–32)
CREAT SERPL-MCNC: 0.8 MG/DL (ref 0.9–1.3)
CULTURE, BLOOD 2: ABNORMAL
EKG ATRIAL RATE: 95 BPM
EKG DIAGNOSIS: NORMAL
EKG P AXIS: 30 DEGREES
EKG P-R INTERVAL: 118 MS
EKG Q-T INTERVAL: 358 MS
EKG QRS DURATION: 70 MS
EKG QTC CALCULATION (BAZETT): 449 MS
EKG R AXIS: 58 DEGREES
EKG T AXIS: 40 DEGREES
EKG VENTRICULAR RATE: 95 BPM
EOSINOPHILS ABSOLUTE: 0.3 K/UL (ref 0–0.6)
EOSINOPHILS RELATIVE PERCENT: 5 %
GFR AFRICAN AMERICAN: >60
GFR NON-AFRICAN AMERICAN: >60
GLUCOSE BLD-MCNC: 128 MG/DL (ref 70–99)
HCT VFR BLD CALC: 28.3 % (ref 40.5–52.5)
HEMOGLOBIN: 9.1 G/DL (ref 13.5–17.5)
LV EF: 53 %
LVEF MODALITY: NORMAL
LYMPHOCYTES ABSOLUTE: 1.6 K/UL (ref 1–5.1)
LYMPHOCYTES RELATIVE PERCENT: 31.3 %
MAGNESIUM: 1.8 MG/DL (ref 1.8–2.4)
MCH RBC QN AUTO: 29.7 PG (ref 26–34)
MCHC RBC AUTO-ENTMCNC: 31.9 G/DL (ref 31–36)
MCV RBC AUTO: 93 FL (ref 80–100)
MONOCYTES ABSOLUTE: 0.6 K/UL (ref 0–1.3)
MONOCYTES RELATIVE PERCENT: 11 %
NEUTROPHILS ABSOLUTE: 2.7 K/UL (ref 1.7–7.7)
NEUTROPHILS RELATIVE PERCENT: 52.2 %
ORGANISM: ABNORMAL
PDW BLD-RTO: 15.2 % (ref 12.4–15.4)
PLATELET # BLD: 125 K/UL (ref 135–450)
PMV BLD AUTO: 7.9 FL (ref 5–10.5)
POTASSIUM REFLEX MAGNESIUM: 3.3 MMOL/L (ref 3.5–5.1)
POTASSIUM SERPL-SCNC: 3.5 MMOL/L (ref 3.5–5.1)
RBC # BLD: 3.05 M/UL (ref 4.2–5.9)
SODIUM BLD-SCNC: 140 MMOL/L (ref 136–145)
WBC # BLD: 5.1 K/UL (ref 4–11)

## 2021-01-02 PROCEDURE — 6360000002 HC RX W HCPCS: Performed by: HOSPITALIST

## 2021-01-02 PROCEDURE — 97161 PT EVAL LOW COMPLEX 20 MIN: CPT

## 2021-01-02 PROCEDURE — 94760 N-INVAS EAR/PLS OXIMETRY 1: CPT

## 2021-01-02 PROCEDURE — 85025 COMPLETE CBC W/AUTO DIFF WBC: CPT

## 2021-01-02 PROCEDURE — 93306 TTE W/DOPPLER COMPLETE: CPT

## 2021-01-02 PROCEDURE — 6370000000 HC RX 637 (ALT 250 FOR IP): Performed by: INTERNAL MEDICINE

## 2021-01-02 PROCEDURE — 80048 BASIC METABOLIC PNL TOTAL CA: CPT

## 2021-01-02 PROCEDURE — 2000000000 HC ICU R&B

## 2021-01-02 PROCEDURE — 97535 SELF CARE MNGMENT TRAINING: CPT

## 2021-01-02 PROCEDURE — 99233 SBSQ HOSP IP/OBS HIGH 50: CPT | Performed by: INTERNAL MEDICINE

## 2021-01-02 PROCEDURE — 97530 THERAPEUTIC ACTIVITIES: CPT

## 2021-01-02 PROCEDURE — 94669 MECHANICAL CHEST WALL OSCILL: CPT

## 2021-01-02 PROCEDURE — 2580000003 HC RX 258: Performed by: HOSPITALIST

## 2021-01-02 PROCEDURE — 83735 ASSAY OF MAGNESIUM: CPT

## 2021-01-02 PROCEDURE — 6370000000 HC RX 637 (ALT 250 FOR IP): Performed by: HOSPITALIST

## 2021-01-02 PROCEDURE — 97116 GAIT TRAINING THERAPY: CPT

## 2021-01-02 PROCEDURE — 93010 ELECTROCARDIOGRAM REPORT: CPT | Performed by: INTERNAL MEDICINE

## 2021-01-02 PROCEDURE — 97165 OT EVAL LOW COMPLEX 30 MIN: CPT

## 2021-01-02 PROCEDURE — 84132 ASSAY OF SERUM POTASSIUM: CPT

## 2021-01-02 PROCEDURE — 2500000003 HC RX 250 WO HCPCS: Performed by: HOSPITALIST

## 2021-01-02 RX ADMIN — Medication 10 ML: at 08:06

## 2021-01-02 RX ADMIN — METOPROLOL TARTRATE 25 MG: 25 TABLET, FILM COATED ORAL at 08:03

## 2021-01-02 RX ADMIN — METOPROLOL TARTRATE 25 MG: 25 TABLET, FILM COATED ORAL at 20:32

## 2021-01-02 RX ADMIN — POTASSIUM BICARBONATE 40 MEQ: 782 TABLET, EFFERVESCENT ORAL at 09:09

## 2021-01-02 RX ADMIN — LEVETIRACETAM 750 MG: 100 INJECTION, SOLUTION INTRAVENOUS at 09:09

## 2021-01-02 RX ADMIN — FAMOTIDINE 20 MG: 10 INJECTION, SOLUTION INTRAVENOUS at 08:04

## 2021-01-02 RX ADMIN — Medication 100 MG: at 08:04

## 2021-01-02 RX ADMIN — FAMOTIDINE 20 MG: 10 INJECTION, SOLUTION INTRAVENOUS at 20:03

## 2021-01-02 RX ADMIN — Medication 10 ML: at 20:03

## 2021-01-02 RX ADMIN — RISPERIDONE 1 MG: 1 TABLET ORAL at 20:32

## 2021-01-02 RX ADMIN — ENOXAPARIN SODIUM 40 MG: 40 INJECTION SUBCUTANEOUS at 08:04

## 2021-01-02 RX ADMIN — THERA TABS 1 TABLET: TAB at 08:04

## 2021-01-02 RX ADMIN — CEFTRIAXONE 2 G: 2 INJECTION, POWDER, FOR SOLUTION INTRAMUSCULAR; INTRAVENOUS at 13:34

## 2021-01-02 RX ADMIN — LEVETIRACETAM 750 MG: 100 INJECTION, SOLUTION INTRAVENOUS at 20:29

## 2021-01-02 ASSESSMENT — ENCOUNTER SYMPTOMS
ABDOMINAL PAIN: 0
NAUSEA: 0
EYE REDNESS: 0
RHINORRHEA: 0
WHEEZING: 0
DIARRHEA: 0
COUGH: 0
BACK PAIN: 0
TROUBLE SWALLOWING: 0
CONSTIPATION: 0
SORE THROAT: 0
SHORTNESS OF BREATH: 0
EYE DISCHARGE: 0

## 2021-01-02 ASSESSMENT — PAIN SCALES - GENERAL
PAINLEVEL_OUTOF10: 0

## 2021-01-02 NOTE — PROGRESS NOTES
Awake in bed, wanting to watch TV. Assessment completed see flow sheet. VS stable at present. Tele on NSR at present. Tamayo patent draining bud urine. Alert to person and place at present. Pleasant & cooperative. K+ 3.3, replaced. Call light in reach, side rails up x3, bed alarm on.  Sherryle Mattes

## 2021-01-02 NOTE — PROGRESS NOTES
Physician Progress Note      Estrella Haile  CSN #:                  [id number]  :                       1965  ADMIT DATE:       2020 10:24 PM  [address] [place] DATE:  RESPONDING  PROVIDER #:        Steve Baron MD          QUERY TEXT:    Pt admitted with seizures/encephalopathy/sepsis. Noted documentation of right   lower lobe PNA on  by ordered infectious disease consultant. If   possible, please document in progress notes and discharge summary:    The medical record reflects the following:  Risk Factors: Seizures. ETOH abuse. Clinical Indicators: Sepsis. Documentation of RLL PNA by infectious disease. CXR from  impression-Slightly increased reticular opacities suggest   vascular congestion/pulmonary edema. This may be superimposed upon chronic   interstitial change. Small right pleural effusion with asymmetric airspace   change at the right base that may represent atelectasis or edema. Underlying   pneumonitis cannot be excluded. Treatment: IV antibiotics. IVF. Monitoring. Thank you, Rajesh Contreras RN. Options provided:  -- Right lower lobe PNA confirmed present on admission  -- Right lower lobe PNA ruled out  -- Defer to infectious disease consultant documentation regarding  Right lower   lobe PNA  -- Other - I will add my own diagnosis  -- Disagree - Not applicable / Not valid  -- Disagree - Clinically unable to determine / Unknown  -- Refer to Clinical Documentation Reviewer    PROVIDER RESPONSE TEXT:    The diagnosis of right lower lobe PNA was confirmed as present on admission.     Query created by: Brittany Fernandes on 2020 2:32 PM      Electronically signed by:  Steve Baron MD 2021 8:04 AM

## 2021-01-02 NOTE — PROGRESS NOTES
Physician Progress Note      PATIENTDajojo PEDERSON #:                  [id number]  :                       1965  ADMIT DATE:       2020 10:24 PM  [facility] DATE:  RESPONDING  PROVIDER #:        Chata Salazar MD          QUERY TEXT:    Pt admitted with seizures/ encephalopathy. Noted documentation of metabolic   encephalopathy related to sepsis on  by infectious disease consultant. If possible, please document in progress notes and discharge summary:    The medical record reflects the following:  Risk Factors: Seizures. Sepsis. ETOH and marijuana abuse. Clinical Indicators: Pt confused and agitated. EEG shows encephalopathy. ID   states Sepsis with high fever, bandemia, metabolic encephalopathy,   hypotension. Severe lactic acidosis with serum lactate of 6.2 on admission. Right lower lobe pneumonia. Postictal state on presentation. History of ETOH   and marijuana abuse. Treatment: Antibiotics. IVF. Precedex. Keppra. Thiamine. Monitoring. Thank you, Misty Maddox RN  Options provided:  -- Metabolic encephalopathy related to sepsis confirmed present on admission  -- Metabolic encephalopathy related to sepsis  ruled out  -- Defer to infectious disease consultant documentation regarding Metabolic   encephalopathy related to sepsis  -- Other - I will add my own diagnosis  -- Disagree - Not applicable / Not valid  -- Disagree - Clinically unable to determine / Unknown  -- Refer to Clinical Documentation Reviewer    PROVIDER RESPONSE TEXT:    The diagnosis of Metabolic encephalopathy related to sepsis was ruled out.     Query created by: David Love on 2020 2:32 PM      Electronically signed by:  Chata Salazar MD 2021 8:27 PM

## 2021-01-02 NOTE — PROGRESS NOTES
Spoke with patient about getting out of bed and up to chair, but he was not in agreement with this plan. He wanted to remain in bed. Encouraged deep breathing and coughing. Patient verbalized agreement with getting up and moving tomorrow.

## 2021-01-02 NOTE — PROGRESS NOTES
Per earlier conversation with Dr. Merced Degroot, attempted IV multiple times today in an attempt to be able to safely remove Right Femoral TLC and then [place] catheter. Patient was incontinent of stool this afternoon and this nurse is very concerned that incontinence of urine could lead to a central line associated blood stream infection if the central line is left in place and the españa removed. Message sent to Dr. Merced Degroot to update her about situation and the lack of IV access if TLC is removed, as well as high infection risk if españa were removed. PICC nurse might be able to get a new IV site, but patient states this is always a problem when he is in the hospital.  Info will be passed on to oncoming RN.

## 2021-01-02 NOTE — PROGRESS NOTES
Physical Therapy    Facility/Department: [facility]  Initial Assessment    NAME: Afshan Mendes  : 1965  MRN: [id number]    Date of Service: 2021    Discharge Recommendations:Gio Goss scored a 19/24 on the AM-PAC short mobility form. Current research shows that an AM-PAC score of 18 or greater is typically associated with a discharge to the patient's home setting. Based on the patient's AM-PAC score and their current functional mobility deficits, it is recommended that the patient have 2-3 sessions per week of Physical Therapy at d/c to increase the patient's independence. At this time, this patient demonstrates the endurance and safety to discharge home with HHPT and a follow up treatment frequency of 2-3x/wk. Please see assessment section for further patient specific details. If patient discharges prior to next session this note will serve as a discharge summary. Please see below for the latest assessment towards goals. HOME HEALTH CARE: LEVEL 1 STANDARD    - Initial home health evaluation to occur within 24-48 hours, in patient home   - Therapy to evaluate with goal of regaining prior level of functioning   - Therapy to evaluate if patient has [address] needs for personal care        PT Equipment Recommendations  Equipment Needed: No    Assessment   Body structures, Functions, Activity limitations: Decreased functional mobility ; Decreased strength;Decreased safe awareness;Decreased cognition;Decreased balance;Decreased endurance  Assessment: Pt is limited by the above deficits and is close to his baseline mobility level. Recommend skilled PT services in acute care setting, and HHPT upon discharge home to group home setting with 24/ supervision.   Prognosis: Good  Decision Making: Low Complexity  PT Education: Goals;PT Role;Plan of Care  Patient Education: Pt verbalized understanding; will require follow up  Barriers to Learning: cognitive  REQUIRES PT FOLLOW UP: Yes  Activity Tolerance  Activity Tolerance: Patient Tolerated treatment well  Activity Tolerance: 97% on RA during session, pre and post ambulation; no SOB or dizziness       Patient Diagnosis(es): The primary encounter diagnosis was Acute encephalopathy. Diagnoses of Seizures ([address] and Acute febrile illness were also pertinent to this visit. has a past medical history of Alcohol abuse and Seizures ([address] has no past surgical history on file. Restrictions  Restrictions/Precautions  Restrictions/Precautions: Fall Risk, Modified Diet(high fall risk, dysphagia minced and moist)  Position Activity Restriction  Other position/activity restrictions: Patient is a 61 yo male with hx seizure, ETOH abuse, tobacco use. He presented to ER per EMS after having multiple seizures. He was confused and agitated in ER requiring restraints, now on Precedex infusion. CT head with no acute abnormality. COVID-19, NAAT, negative. Vision/Hearing  Vision: Impaired  Vision Exceptions: Wears glasses at all times  Hearing: Within functional limits       Subjective  General  Chart Reviewed: Yes  Patient assessed for rehabilitation services?: Yes  Family / Caregiver Present: No  Diagnosis: Seizure  Follows Commands: Within Functional Limits  General Comment  Comments: Pt supine in bed upon arrival; agreeable to PT/OT  Subjective  Subjective: Pt reports chest pain, but no number given and no pain noted with mobility  Pain Screening  Patient Currently in Pain: Yes     Orientation  Orientation  Overall Orientation Status: Impaired  Orientation Level: Oriented to person;Disoriented to time;Oriented to place; Disoriented to situation     Social/Functional History  Social/Functional History  Lives With: (group home w/ one roomate)  Type of Home: House  Home Layout: One level  Home Access: Stairs to enter with rails  Entrance Stairs - Number of Steps: full flight to enter  Bathroom Shower/Tub: Walk-in shower  Bathroom Equipment: Grab bars in shower  ADL Assistance: Independent  Homemaking Assistance: Needs assistance(facility provides servies, pt has meals in dinning area of [facility] home- 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\")  Ambulation Assistance: Independent  Transfer Assistance: Independent  Additional Comments: pt reports no falls in the past 6 months     Cognition   Cognition  Overall Cognitive Status: Exceptions  Arousal/Alertness: Appropriate responses to stimuli  Following Commands:  Follows one step commands consistently  Attention Span: Attends with cues to redirect  Memory: Decreased recall of recent events;Decreased short term memory  Safety Judgement: Decreased awareness of need for safety  Problem Solving: Assistance required to implement solutions  Insights: Decreased awareness of deficits  Initiation: Requires cues for some  Sequencing: Requires cues for some    Objective   AROM RLE (degrees)  RLE AROM: WFL  AROM LLE (degrees)  LLE AROM : WFL  Strength RLE  Strength RLE: WFL  Strength LLE  Strength LLE: WFL  Tone RLE  RLE Tone: Normotonic  Tone LLE  LLE Tone: Normotonic  Sensation  Overall Sensation Status: WFL  Bed mobility  Supine to Sit: Stand by assistance(HOB elevated, SBA to EOB)  Scooting: Stand by assistance;Contact guard assistance  Transfers  Sit to Stand: Stand by assistance;Contact guard assistance  Stand to sit: Contact guard assistance;Stand by assistance  Ambulation  Ambulation?: Yes  Ambulation 1  Device: No Device  Assistance: Stand by assistance;Contact guard assistance  Quality of Gait: increased RICKY at times; no LOB  Gait Deviations: Slow Nathalie;Decreased step length  Distance: 270 ft  Stairs/Curb  Stairs?: No     Balance  Posture: Fair  Sitting - Static: Good;-  Sitting - Dynamic: Good;-  Standing - Static: Good;-  Standing - Dynamic: Good;-  Comments: Pt required SBA-CGA for all standing and sitting dynamic activities; pt reached to floor to manage socks; stood at sink to wash hands; brushed teeth in chair at end of session; no LOB     Exercises  Comments: Pt encouraged to perform AROM UE and LE in chair     Plan   Plan  Times per week: 3-5x  Times per day: Daily  Current Treatment Recommendations: Strengthening, Balance Training, Functional Mobility Training, Transfer Training, Gait Training, Safety Education & Training  Safety Devices  Type of devices:  All fall risk precautions in place, Call light within reach, Chair alarm in place, Gait belt, Patient at risk for falls, Left in chair, Nurse notified(LUIS Bruner)      AM-PAC Score  AM-PAC Inpatient Mobility Raw Score : 19 (01/02/21 1109)  AM-PAC Inpatient T-Scale Score : 45.44 (01/02/21 1109)  Mobility Inpatient CMS 0-100% Score: 41.77 (01/02/21 1109)  Mobility Inpatient CMS G-Code Modifier : CK (01/02/21 1109)          Goals  Short term goals  Time Frame for Short term goals: discharge  Short term goal 1: bed mobility with mod I  Short term goal 2: sit to/from stand with supervision  Short term goal 3: amb 300 ft with supervision with no AD  Patient Goals   Patient goals : go home       Therapy Time   Individual Concurrent Group Co-treatment   Time In 0914         Time Out 0954         Minutes 40         Timed Code Treatment Minutes: [address], [address] [facility]

## 2021-01-02 NOTE — PROGRESS NOTES
Infectious Diseases   Progress Note      Admission Date: 12/29/2020  Hospital Day: Hospital Day: 5   Attending: Mavis Baez MD  Date of service: 1/2/2021     Chief complaint/ Reason for consult:     · Sepsis with high fever, bandemia, metabolic encephalopathy, hypotension  · Severe lactic acidosis with serum lactate of 6.2 on admission  · Streptococcus viridans bacteremia  · Right lower lobe pneumonia  · Postictal state on presentation  · Negative urine tox screen    Microbiology:        I have reviewed allavailable micro lab data and cultures    Blood culture (2/2) - collected on 12/29/2020: Streptococcus viridans    Antibiotics and immunizations:       Current antibiotics: All antibiotics and their doses were reviewed by me    Recent Abx Admin                   cefTRIAXone (ROCEPHIN) 2 g IVPB in D5W 50ml minibag (g) 2 g New Bag 01/02/21 1334                  Immunization History: All immunization history was reviewed by me today. There is no immunization history for the selected administration types on file for this patient. Known drug allergies: All allergies were reviewed and updated    Allergies   Allergen Reactions    Carbamazepine Other (See Comments)     Thrombocytopenia    Codeine Itching     Per ER    Phenytoin Itching    Sulfa Antibiotics      Per ER       Social history:     Social History:  All social andepidemiologic history was reviewed and updated by me today as needed. · Tobacco use:   reports that he has been smoking. He has a 40.00 pack-year smoking history. He has never used smokeless tobacco.  · Alcohol use:   reports current alcohol use of about 2.0 standard drinks of alcohol per week. · Currently lives in: [place] [id number]  ·  reports current drug use. Drug: Marijuana. Assessment:     The patient is a 60 y.o. old male who  has a past medical history of Alcohol abuse and Seizures ([facility] [address]  with following problems:    · Sepsis with high fever, bandemia, metabolic encephalopathy, hypotension-improving  · Severe lactic acidosis with serum lactate of 6.2 on admission-in setting of bacteremia and post ictal state, resolved  · Streptococcus viridans bacteremia -covered with Rocephin   · Right lower lobe pneumonia-covered with ceftriaxone  · Postictal state on presentation  · Negative urine tox screen  · History of marijuana abuse-counseling done  · Chronic small vessel ischemia  · History of alcohol abuse  · Smoker-counseling done      Discussion:      His blood culture from 12/30/2020 was positive for Streptococcus viridans indicating streptococcal redness bacteremia. I had started the patient on IV ceftriaxone on 12/31/2020. His fever curve is coming down. White cell count is improved to 5100 today. Respiratory viral PCR panel was done on 12/31/2020 and has been negative. Repeat blood cultures from 12/21/2020 are negative so far. 2D echo reviewed. Did not show any obvious valvular vegetations. Plan:     Diagnostic Workup:    · Follow-up on blood cultures from 12/1/2020  · Continue to follow  fever curve, WBC count and blood cultures. · Continue to monitor blood counts, liver and renal function. Antimicrobials:    · Will continue IV ceftriaxone 2 g every 24 hour  · Continue to monitor his vitals closely  · We will follow up on the culture results and clinical progress and will make further recommendations accordingly. · Continue close vitals monitoring. · Maintain good glycemic control. · Fall precautions. Aspiration precautions. · Continue to watch for new fever or diarrhea. · DVT prophylaxis. · Discussed all above with patient and RN. Drug Monitoring:    · Continue monitoring for antibiotic toxicity as follows: CBC, CMP   · Continue to watch for following: new or worsening fever, new hypotension, hives, lip swelling and redness or purulence at vascular access sites.      I/v access Management:    · Continue to monitor i.v access sites for erythema, induration, discharge or tenderness. · As always, continue efforts to minimize tubes/lines/drains as clinically appropriate to reduce chances of line associated infections. Patient education and counseling:        · The patient was educated in detail about the side-effects of various antibiotics and things to watch for like new rashes, lip swelling, severe reaction, worsening diarrhea, break through fever etc.  · Discussed patient's condition and what to expect. All of the patient's questions were addressed in a satisfactory manner and patient verbalized understanding all instructions. Level of complexity of visit: High     Illicit drug use and tobacco use disorder counselin. I have counseled the patient extensively about risks of using illicit drugs and have encouraged the patient to maintain a healthy drug-free lifestyle. Information was given about various substance use prevention programs available in the area and their websites including www. addicted. org, www.madd. org, www.catsober. org, www.[employer]. [employer] and www. addictionservicescouncil.org.  2. I have counseled the patient extensively about risks of smoking and have encouraged the patient to maintain a healthy smoke-free lifestyle. Information was given about various smoking cessation programs and their websites like www.smokefree.gov, [place]. quitlogix. org as well as help lines like -936-QUIT-NOW and 800-LUNG-[place]. TIME SPENT TODAY:     - Spent over  37  minutes on visit (including interval history, physical exam, review of data including labs, cultures, imaging, development and implementation of treatment plan and coordination of complex care). More than 50 percent of this includes face-to-face time spent with the patient for counseling and coordination of care. Thank you for involving me in the care of your patient. I will continue to follow.  If you have anyadditional questions, please do not hesitate to contact me. Subjective: Interval history: Interval history was obtained from chart review and patient/ RN. He is on IV ceftriaxone. He is slowly improving. Fever has resolved. He is more awake today. REVIEW OF SYSTEMS:      Review of Systems   Constitutional: Negative for chills, diaphoresis and fever. HENT: Negative for ear discharge, ear pain, rhinorrhea, sore throat and trouble swallowing. Eyes: Negative for discharge and redness. Respiratory: Negative for cough, shortness of breath and wheezing. Cardiovascular: Negative for chest pain and leg swelling. Gastrointestinal: Negative for abdominal pain, constipation, diarrhea and nausea. Endocrine: Negative for polyuria. Genitourinary: Negative for dysuria, flank pain, frequency, hematuria and urgency. Musculoskeletal: Negative for back pain and myalgias. Skin: Negative for rash. Neurological: Negative for dizziness, seizures and headaches. Hematological: Does not bruise/bleed easily. Psychiatric/Behavioral: Negative for hallucinations and suicidal ideas. All other systems reviewed and are negative. Past Medical History: All past medical history reviewed today. Past Medical History:   Diagnosis Date    Alcohol abuse     Seizures ([facility] [address]        Past Surgical History: All past surgical history was reviewed today. History reviewed. No pertinent surgical history. Family History: All family history was reviewed today. History reviewed. No pertinent family history. Objective:       PHYSICAL EXAM:      Vitals:   Vitals:    01/02/21 0803 01/02/21 0900 01/02/21 1336 01/02/21 1400   BP: 127/66 (!) 123/56 (!) 141/72 (!) 114/54   Pulse: 78 68 87 82   Resp:   20    Temp:   98.4 °F (36.9 °C)    TempSrc:   Temporal    SpO2:   95%    Weight:       Height:           Physical Exam  Vitals signs and nursing note reviewed. Constitutional:       Appearance: Normal appearance. He is well-developed.    HENT:      Head: Normocephalic and atraumatic. Right Ear: External ear normal.      Left Ear: External ear normal.      Nose: Nose normal. No congestion or rhinorrhea. Mouth/Throat:      Mouth: Mucous membranes are moist.      Pharynx: No oropharyngeal exudate or posterior oropharyngeal erythema. Eyes:      General: No scleral icterus. Right eye: No discharge. Left eye: No discharge. Conjunctiva/sclera: Conjunctivae normal.      Pupils: Pupils are equal, round, and reactive to light. Neck:      Musculoskeletal: Normal range of motion and neck supple. No neck rigidity or muscular tenderness. Cardiovascular:      Rate and Rhythm: Normal rate and regular rhythm. Pulses: Normal pulses. Heart sounds: No murmur. No friction rub. Pulmonary:      Effort: Pulmonary effort is normal. No respiratory distress. Breath sounds: Normal breath sounds. No stridor. No wheezing, rhonchi or rales. Abdominal:      General: Bowel sounds are normal.      Palpations: Abdomen is soft. Tenderness: There is no abdominal tenderness. There is no right CVA tenderness, left CVA tenderness, guarding or rebound. Musculoskeletal: Normal range of motion. General: No swelling or tenderness. Lymphadenopathy:      Cervical: No cervical adenopathy. Skin:     General: Skin is warm and dry. Coloration: Skin is not jaundiced. Findings: No erythema or rash. Neurological:      General: No focal deficit present. Mental Status: He is alert and oriented to person, place, and time. Mental status is at baseline. Motor: No abnormal muscle tone. Psychiatric:         Mood and Affect: Mood normal.         Behavior: Behavior normal.         Thought Content: Thought content normal.       ''              Intake and output:    I/O last 3 completed shifts: In: 0005 [P.O.:600;  I.V.:471; IV Piggyback:200]  Out: 2250 [Urine:2250]    Lab Data:   All available labs and old records have been reviewed by me. CBC:  Recent Labs     12/31/20  0450 01/01/21  0945 01/02/21  0624   WBC 11.8* 5.3 5.1   RBC 3.21* 3.22* 3.05*   HGB 9.4* 9.6* 9.1*   HCT 29.8* 29.9* 28.3*   * 122* 125*   MCV 92.8 92.7 93.0   MCH 29.2 29.6 29.7   MCHC 31.5 32.0 31.9   RDW 15.3 15.1 15.2        BMP:  Recent Labs     12/31/20  0450 01/01/21  0945 01/02/21  0624 01/02/21  1115    136 140  --    K 3.3* 3.1* 3.3* 3.5   * 101 106  --    CO2 27 26 27  --    BUN 9 4* 3*  --    CREATININE 0.7* 0.8* 0.8*  --    CALCIUM 8.2* 8.0* 8.2*  --    GLUCOSE 103* 177* 128*  --         Hepatic Function Panel:   Lab Results   Component Value Date    ALKPHOS 92 01/01/2021    ALT 13 01/01/2021    AST 47 01/01/2021    PROT 7.4 01/01/2021    BILITOT 0.8 01/01/2021    BILIDIR 0.4 01/01/2021    IBILI 0.4 01/01/2021    LABALBU 2.3 01/01/2021       CPK:   Lab Results   Component Value Date    CKTOTAL 118 12/29/2020     ESR: No results found for: SEDRATE  CRP: No results found for: CRP        Imaging: All pertinent images and reports for the current visit were reviewed by me during this visit. XR CHEST PORTABLE   Final Result   Slightly increased reticular opacities suggest vascular congestion/pulmonary   edema. This may be superimposed upon chronic interstitial change. Small   right pleural effusion with asymmetric airspace change at the right base that   may represent atelectasis or edema. Underlying pneumonitis cannot be   excluded. CT HEAD WO CONTRAST   Final Result   No acute intracranial abnormality. Moderate white matter disease, likely chronic small vessel ischemia. XR CHEST PORTABLE   Final Result   Scarring/atelectatic changes seen overlying the lateral aspect of the right   mid to lower lung field and right lung base. Possible scarring and bullous   changes of the left lung apex is redemonstrated. No confluent airspace   opacity or pleural effusion is seen. Medications:  All current and past medications were reviewed.      risperiDONE  1 mg Oral Nightly    metoprolol tartrate  25 mg Oral BID    thiamine mononitrate  100 mg Oral Daily    influenza virus vaccine  0.5 mL Intramuscular Prior to discharge    sodium chloride flush  10 mL Intravenous 2 times per day    enoxaparin  40 mg Subcutaneous Daily    famotidine (PEPCID) injection  20 mg Intravenous BID    levetiracetam  750 mg Intravenous Q12H    cefTRIAXone (ROCEPHIN) IV  2 g Intravenous Q24H    multivitamin  1 tablet Oral Daily           magnesium sulfate, potassium chloride, sodium chloride flush, promethazine **OR** ondansetron, polyethylene glycol, acetaminophen **OR** acetaminophen, potassium chloride **OR** potassium alternative oral replacement **OR** potassium chloride, LORazepam, benzonatate, LORazepam **OR** LORazepam **OR** LORazepam **OR** LORazepam **OR** LORazepam **OR** LORazepam **OR** LORazepam **OR** LORazepam      Problem list:       Patient Active Problem List   Diagnosis Code    Seizure secondary to subtherapeutic anticonvulsant medication ([facility] [address] R56.9, Z79.899    Partial symptomatic epilepsy with complex partial seizures, intractable, without status epilepticus ([facility] [address] G40.219    Noncompliance with medication regimen Z91.14    Partial epilepsy with impairment of consciousness, intractable ([facility] [address] G40.219    Personal history of noncompliance with medical treatment Z91.19    Seizures ([address] R56.9    Alcohol abuse F10.10    Pneumonia J18.9    Nausea and vomiting R11.2    Breakthrough seizure ([facility] [address] [facility] G40.919    Acute encephalopathy G93.40    Acute febrile illness R50.9    Sepsis ([facility] [address] A41.9    Lactic acid acidosis E87.2    Smoker F17.200    Cerebrovascular small vessel disease I67.9    Streptococcal bacteremia R78.81, B95.5    Streptococcus viridans infection A49.1    Drug or alcohol risk assessment or counseling Z71.89    Tobacco abuse counseling Z71.6       Please note that this chart was generated using Dragon dictation software. Although every effort was made to ensure the accuracy of this automated transcription, some errors in transcription may have occurred inadvertently. If you may need any clarification, please do not hesitate to contact me through EPIC or at the phone number provided below with my electronic signature. Any pictures or media included in this note were obtained after taking informed verbal consent from the patient and with their approval to include those in the patient's medical record.     Lizbeth Portillo MD, MPH  1/2/2021 , 2:47 PM   [facility] Infectious Disease   [address] [place] [address]  Office: [phone]  Fax: [phone]  Clinic days:  Tuesday & Thursday

## 2021-01-02 NOTE — PROGRESS NOTES
[facility] PROGRESS NOTE    1/2/2021 9:32 AM        Name: Asfhan Mendes . Admitted: 12/29/2020  Primary Care Provider: No primary care provider on file. (Tel: None)      Chief complaint: Seizures, most likely secondary to noncompliance with antiepileptic medications    Brief History: Patient presented to ER per EMS after having multiple seizures. Apparently, he has been noncompliant with his Keppra. Level was undetectable on admission. Was loaded with Keppra intravenously. Patient also became confused and agitated in the emergency department. Admitted to ICU. Required restraints and started on Precedex drip. Precedex has been since discontinued. Patient is more cooperative. COVID-19 tested negative with rapid test and later with PCR  Found to have bacteremia        Subjective:   Sleeping, arousable, no acute complaints.     Current Medications    Scheduled Meds:   risperiDONE  1 mg Oral Nightly    metoprolol tartrate  25 mg Oral BID    thiamine mononitrate  100 mg Oral Daily    influenza virus vaccine  0.5 mL Intramuscular Prior to discharge    sodium chloride flush  10 mL Intravenous 2 times per day    enoxaparin  40 mg Subcutaneous Daily    famotidine (PEPCID) injection  20 mg Intravenous BID    levetiracetam  750 mg Intravenous Q12H    cefTRIAXone (ROCEPHIN) IV  2 g Intravenous Q24H    multivitamin  1 tablet Oral Daily     Continuous Infusions:  PRN Meds:.magnesium sulfate, potassium chloride, sodium chloride flush, promethazine **OR** ondansetron, polyethylene glycol, acetaminophen **OR** acetaminophen, potassium chloride **OR** potassium alternative oral replacement **OR** potassium chloride, LORazepam, benzonatate, LORazepam **OR** LORazepam **OR** LORazepam **OR** LORazepam **OR** LORazepam **OR** LORazepam **OR** LORazepam **OR** LORazepam     Objective:  /66   Pulse 78   Temp cannot be   excluded. CT HEAD WO CONTRAST   Final Result   No acute intracranial abnormality. Moderate white matter disease, likely chronic small vessel ischemia. XR CHEST PORTABLE   Final Result   Scarring/atelectatic changes seen overlying the lateral aspect of the right   mid to lower lung field and right lung base. Possible scarring and bullous   changes of the left lung apex is redemonstrated. No confluent airspace   opacity or pleural effusion is seen. EEG 12/30/2020: Impression: This EEG is abnormal.  The generalized diffuse slowing is suggestive of mild to moderate diffuse encephalopathy. There is no evidence of epileptiform discharges, focal, or lateralizing abnormalities. Problem List  Active Problems:    Partial epilepsy with impairment of consciousness, intractable (HCC)    Personal history of noncompliance with medical treatment    Seizure secondary to subtherapeutic anticonvulsant medication ([address]    Partial symptomatic epilepsy with complex partial seizures, intractable, without status epilepticus ([address]    Noncompliance with medication regimen    Seizures ([address]    Alcohol abuse    Breakthrough seizure ([address]    Acute encephalopathy    Streptococcal bacteremia    Streptococcus viridans infection  Resolved Problems:    * No resolved hospital problems. *       Assessment & Plan:     Strep viridans bacteremia:   Unclear focus, probably pneumonia. However, bacteremia caused by endocarditis is also a possibility. Echo ordered, not yet performed  Infectious disease consult requested  On IV ceftriaxone  Repeat blood cultures pending  PICC line placement needed once blood cultures cleared. Sepsis present on arrival  Secondary to bacteremia and pneumonia. Fever, tachycardia, tachypnea and lactic acidosis on arrival.  Clinically improving with treatment of underlying cause.     Right lower lobe pneumonia  Presumed gram-positive with Streptococcus viridans  Continue treatment with IV ceftriaxone  Infectious diseases following    Uncontrolled seizure disorder  Most likely secondary to noncompliance with undetectable Keppra level on arrival.  Evaluated by neurology  EEG performed  Continue Keppra 750 p.o. twice a day. Acute metabolic encephalopathy  Etiology multifactorial, with pneumonia, sepsis and seizures. CT head with no acute abnormality. EEG with mild to moderate diffuse encephalopathy. Neurology on board, considered less likely meningoencephalitis. Precedex is currently discontinued    Sinus tachycardia:   Clinically euvolemic. Resolved with low-dose metoprolol. Alcohol abuse  No signs of active withdrawal.  Continue supportive management in addition to thiamine and multivitamins. Diet: DIET DYSPHAGIA MINCED AND MOIST;  Code:Full Code  DVT PPX: Lovenox  Dispo: Unclear at this time, pending further work-up and echo results.       Tabby Cancino MD   1/2/2021 9:42 AM

## 2021-01-02 NOTE — PROGRESS NOTES
Shift assessment complete. Meds given per MAR. See flowsheet  VS: WNL  Neurovascular Assessment: WNL, pt refusing to walk at this time. Nausea/vomiting: Pt denies  Drains: Tamayo remains in place  Pain: pt denies at this time   Patient expressed no other needs at this time, will continue to educate patient as needed. Fall precautions in place, hourly rounding, call light and belongings in reach, bed in lowest position, wheels locked in place, side rails up x 2, walkways free of clutter    The care plan and education has been reviewed and mutually agreed upon with the patient.

## 2021-01-02 NOTE — PROGRESS NOTES
Stable this shift. Up to chair today, tolerated well. Tele on NSR. Tamayo patent. Right femoral line intact, site unremarkable, dressing dry & intact. Taking po well, tolerating well. Pleasant & cooperative. Wanting to go home, discussed need for IV medication for seizures and IV antibiotics for infection. Verbalized understanding. Coughing occasionally today. Call light in reach, side rails ups x3, bed alarm on.  Attila Lopez

## 2021-01-02 NOTE — PROGRESS NOTES
Patient called sister telling her he was being discharged, explained to patient and sister that he is still on IV Keppra for seizures and antibiotics for an infections and still has a españa. Will be a few more days before discharge. Sister and patient verbalized understanding. Time permitted for questions, questions answered.  Alavro Huitron

## 2021-01-02 NOTE — PLAN OF CARE
Problem: Falls - Risk of:  Goal: Will remain free from falls  Description: Will remain free from falls  1/2/2021 1636 by Nicole Benjamin RN  Outcome: Ongoing  Note: Will call for assistance as needed  1/2/2021 0942 by Nicole Benjamin RN  Outcome: Ongoing  Note: Patient will call out for assistance as needed     Problem: Daily Care:  Goal: Daily care needs are met  Description: Daily care needs are met  Outcome: Ongoing  Note: Plan of care reviewed     Problem: Discharge Planning:  Goal: Patients continuum of care needs are met  Description: Patients continuum of care needs are met  Outcome: Ongoing  Note: Discussed discharge planning     Problem: Restraint Use - Nonviolent/Non-Self-Destructive Behavior:  Goal: Absence of restraint indications  Description: Absence of restraint indications  Outcome: Completed  Goal: Absence of restraint-related injury  Description: Absence of restraint-related injury  Outcome: Completed     Problem: Restraint Use - Nonviolent/Non-Self-Destructive Behavior:  Goal: Absence of restraint indications  Description: Absence of restraint indications  Outcome: Completed  Goal: Absence of restraint-related injury  Description: Absence of restraint-related injury  Outcome: Completed

## 2021-01-02 NOTE — PROGRESS NOTES
Awakened for reassessment, see flow sheet. VS stable. Right femoral line intact, site unremarkable, dressing dry & intact, infusing at present without difficulty. Tele on NSR. Declines lunch at present, would like to continue sleeping. Tamayo patent draining yellow urine. Denies pain or needs at present. Call light in reach, side rails up x3, bed alarm on.  Scooter Buckley

## 2021-01-02 NOTE — PROGRESS NOTES
Patient sleeping at present. Tele on NSR, españa patent draining bud urine.  Call light in reach, side rails up x3, bed alarm on Anna Art

## 2021-01-02 NOTE — PROGRESS NOTES
Occupational Therapy   Occupational Therapy Initial Assessment  Date: 2021   Patient Name: Jimenez Henning  MRN: [id number]     : 1965    Date of Service: 2021    Discharge Recommendations: Jimenez Henning scored a 19/24 on the AM-PAC ADL Inpatient form. Current research shows that an AM-PAC score of 18 or greater is typically associated with a discharge to the patient's home setting. Based on the patient's AM-PAC score, and their current ADL deficits, it is recommended that the patient have 2-3 sessions per week of Occupational Therapy at d/c to increase the patient's independence. At this time, this patient demonstrates the endurance and safety to discharge home with home services and a follow up treatment frequency of 2-3x/wk. Please see assessment section for further patient specific details. If patient discharges prior to next session this note will serve as a discharge summary. Please see below for the latest assessment towards goals. HOME HEALTH CARE: LEVEL 1 STANDARD    - Initial home health evaluation to occur within 24-48 hours, in patient home   - Therapy to evaluate with goal of regaining prior level of functioning   - Therapy to evaluate if patient has [address] needs for personal care       OT Equipment Recommendations  Equipment Needed: No  Other: reports all needed equipment at home    Assessment   Performance deficits / Impairments: Decreased functional mobility ; Decreased ADL status; Decreased cognition;Decreased safe awareness  Assessment: Pt is a 60 yr old male who presents with the above deficits impacting his occupational performance.  pt is slightly below baseline level of function and would benefit from skilled OT services in order to maximize his independence and safety  Treatment Diagnosis: decreased ADL and functional mobility status associated with seizure, encephalopathy/PNA  Prognosis: Good  Decision Making: Medium Complexity  History: Pt lives in group home, reports (I) with ADLs and mobility, facilities provided cooking/cleaning services  OT Education: OT Role;Plan of Care;ADL Adaptive Strategies  Patient Education: pt verbalized understanding however would benefit from continued reinforcement  Barriers to Learning: cognition  REQUIRES OT FOLLOW UP: Yes  Activity Tolerance  Activity Tolerance: Patient Tolerated treatment well  Activity Tolerance: pt on 1L O2 at entry w/ SpO2 of 100%- O2 removed per RN approval for session- pt able to maintain SpO2 [facility]  Safety Devices  Safety Devices in place: Yes  Type of devices: Left in chair;Call light within reach; Chair alarm in place;Nurse notified; All fall risk precautions in place           Patient Diagnosis(es): The primary encounter diagnosis was Acute encephalopathy. Diagnoses of Seizures ([facility] and Acute febrile illness were also pertinent to this visit. has a past medical history of Alcohol abuse and Seizures ([facility] has no past surgical history on file. Treatment Diagnosis: decreased ADL and functional mobility status associated with seizure, encephalopathy/PNA      Restrictions  Restrictions/Precautions  Restrictions/Precautions: Fall Risk, Modified Diet(high fall risk, dysphagia minced and moist)  Position Activity Restriction  Other position/activity restrictions: Patient is a 61 yo male with hx seizure, ETOH abuse, tobacco use. He presented to ER per EMS after having multiple seizures. He was confused and agitated in ER requiring restraints, now on Precedex infusion. CT head with no acute abnormality. COVID-19, NAAT, negative.     Subjective   General  Chart Reviewed: Yes  Patient assessed for rehabilitation services?: Yes  Diagnosis: seizures  Subjective  Subjective: Pt supine in bed at entry, agreeable to eval  Patient Currently in Pain: Yes  Pain Assessment  Pain Assessment: 0-10  Pain Level: 0  Non-Pharmaceutical Pain Intervention(s): Rest;Repositioned  Vital Signs  Patient Currently in Pain: Yes    Social/Functional History  Social/Functional History  Lives With: (group home w/ one roomate)  Type of Home: House  Home Layout: One level  Home Access: Stairs to enter with rails  Entrance Stairs - Number of Steps: full flight to enter  Bathroom Shower/Tub: Walk-in shower  Bathroom Equipment: Grab bars in shower  ADL Assistance: [address]: Needs assistance(facility provides servies, pt has meals in dinning area of [employer] home- 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\")  Ambulation Assistance: Independent  Transfer Assistance: Independent  Additional Comments: pt reports no falls in the past 6 months       Objective   Vision: Impaired  Vision Exceptions: Wears glasses at all times  Hearing: Within functional limits    Orientation  Overall Orientation Status: Impaired  Orientation Level: Oriented to person;Disoriented to time;Oriented to place; Disoriented to situation     Balance  Sitting Balance: Stand by assistance  Standing Balance: Contact guard assistance  Standing Balance  Time: ~12min total  Activity: functional mobility/transfers, ADL completion  Comment: no LOB noted  Functional Mobility  Functional - Mobility Device: No device  Activity: To/from bathroom; Other  Assist Level: Contact guard assistance  Functional Mobility Comments: CGA-SBA- ~200ft around unit + 15ft within room  Toilet Transfers  Toilet - Technique: Ambulating  Equipment Used: Standard toilet  Toilet Transfer: Stand by assistance  ADL  Grooming: Setup;Contact guard assistance(CGA to SBA- hand hygiene in stance at sink, oral care seated in recliner)  UE Bathing: Setup;Stand by assistance  LE Bathing: Contact guard assistance;Setup;Verbal cueing  UE Dressing: Setup(gown change EOB, assist for monitor mangement)  LE Dressing: Minimal assistance;Setup(Alexis secondary to españa managament, CGA for donning/doffing over hips in stance)  Toileting: Contact guard assistance; Increased time to complete(CGA for clothing management over hips, cues for thorough zainab care post BM)  Additional Comments: EOB UB/LB bathing w/ warm wipes and dressing- to then ambulated to bathroom for toileting needs- increased time for all tasks  Tone RUE  RUE Tone: Normotonic  Tone LUE  LUE Tone: Normotonic  Coordination  Movements Are Fluid And Coordinated: Yes        Transfers  Sit to stand: Stand by assistance  Stand to sit: Stand by assistance  Vision - Basic Assessment  Prior Vision: Wears glasses all the time  Patient Visual Report: No visual complaint reported. Cognition  Overall Cognitive Status: Exceptions  Arousal/Alertness: Appropriate responses to stimuli  Following Commands:  Follows one step commands consistently  Attention Span: Attends with cues to redirect  Memory: Decreased recall of recent events;Decreased short term memory  Safety Judgement: Decreased awareness of need for safety  Problem Solving: Assistance required to implement solutions  Insights: Decreased awareness of deficits  Initiation: Requires cues for some  Sequencing: Requires cues for some        Sensation  Overall Sensation Status: WFL        LUE AROM (degrees)  LUE AROM : WFL  RUE AROM (degrees)  RUE AROM : WFL  LUE Strength  Gross LUE Strength: WFL  RUE Strength  Gross RUE Strength: WFL                   Plan   Plan  Times per week: 3-5x  Times per day: Daily  Current Treatment Recommendations: Safety Education & Training, Self-Care / ADL, Functional Mobility Training, Patient/Caregiver Education & Training, Endurance Training             AM-PAC Score        AM-[facility] Inpatient Daily Activity Raw Score: 19 (01/02/21 1115)  AM-PAC Inpatient ADL T-Scale Score : 40.22 (01/02/21 1115)  ADL Inpatient CMS 0-100% Score: 42.8 (01/02/21 1115)  ADL Inpatient CMS G-Code Modifier : CK (01/02/21 1115)    Goals  Short term goals  Time Frame for Short term goals: D/C  Short term goal 1: Estrada for LB bathing/dressing  Short term goal 2: Estrada for functional mobility/transfers for ADL completion  Short term goal 3: Estrada for toileting       Therapy Time   Individual Concurrent Group Co-treatment   Time In       Stacey Barakat 1952   Time Out       0953   Minutes       39     Timed Code Treatment Minutes:  25 Minutes    Total Treatment Minutes:  [address], [address], OTR/L #[id number]

## 2021-01-02 NOTE — PLAN OF CARE
Problem: Restraint Use - Nonviolent/Non-Self-Destructive Behavior:  Goal: Absence of restraint indications  Description: Absence of restraint indications  Outcome: Completed  Goal: Absence of restraint-related injury  Description: Absence of restraint-related injury  Outcome: Completed     Problem: Falls - Risk of:  Goal: Will remain free from falls  Description: Will remain free from falls  Outcome: Ongoing  Note: Patient will call out for assistance as needed

## 2021-01-03 LAB
ANION GAP SERPL CALCULATED.3IONS-SCNC: 8 MMOL/L (ref 3–16)
BASOPHILS ABSOLUTE: 0 K/UL (ref 0–0.2)
BASOPHILS RELATIVE PERCENT: 0.7 %
BUN BLDV-MCNC: <2 MG/DL (ref 7–20)
CALCIUM SERPL-MCNC: 8.5 MG/DL (ref 8.3–10.6)
CHLORIDE BLD-SCNC: 102 MMOL/L (ref 99–110)
CO2: 30 MMOL/L (ref 21–32)
CREAT SERPL-MCNC: 0.8 MG/DL (ref 0.9–1.3)
EOSINOPHILS ABSOLUTE: 0.3 K/UL (ref 0–0.6)
EOSINOPHILS RELATIVE PERCENT: 6.6 %
GFR AFRICAN AMERICAN: >60
GFR NON-AFRICAN AMERICAN: >60
GLUCOSE BLD-MCNC: 169 MG/DL (ref 70–99)
HCT VFR BLD CALC: 30.1 % (ref 40.5–52.5)
HEMOGLOBIN: 9.7 G/DL (ref 13.5–17.5)
LYMPHOCYTES ABSOLUTE: 1.7 K/UL (ref 1–5.1)
LYMPHOCYTES RELATIVE PERCENT: 36 %
MAGNESIUM: 1.4 MG/DL (ref 1.8–2.4)
MCH RBC QN AUTO: 29.9 PG (ref 26–34)
MCHC RBC AUTO-ENTMCNC: 32.2 G/DL (ref 31–36)
MCV RBC AUTO: 92.7 FL (ref 80–100)
MONOCYTES ABSOLUTE: 0.8 K/UL (ref 0–1.3)
MONOCYTES RELATIVE PERCENT: 16.3 %
NEUTROPHILS ABSOLUTE: 1.9 K/UL (ref 1.7–7.7)
NEUTROPHILS RELATIVE PERCENT: 40.4 %
PDW BLD-RTO: 15.2 % (ref 12.4–15.4)
PLATELET # BLD: 128 K/UL (ref 135–450)
PMV BLD AUTO: 7.9 FL (ref 5–10.5)
POTASSIUM REFLEX MAGNESIUM: 3.3 MMOL/L (ref 3.5–5.1)
RBC # BLD: 3.25 M/UL (ref 4.2–5.9)
SODIUM BLD-SCNC: 140 MMOL/L (ref 136–145)
WBC # BLD: 4.6 K/UL (ref 4–11)

## 2021-01-03 PROCEDURE — 83735 ASSAY OF MAGNESIUM: CPT

## 2021-01-03 PROCEDURE — 80048 BASIC METABOLIC PNL TOTAL CA: CPT

## 2021-01-03 PROCEDURE — 2500000003 HC RX 250 WO HCPCS: Performed by: HOSPITALIST

## 2021-01-03 PROCEDURE — 6370000000 HC RX 637 (ALT 250 FOR IP): Performed by: INTERNAL MEDICINE

## 2021-01-03 PROCEDURE — 6370000000 HC RX 637 (ALT 250 FOR IP): Performed by: HOSPITALIST

## 2021-01-03 PROCEDURE — 2580000003 HC RX 258: Performed by: HOSPITALIST

## 2021-01-03 PROCEDURE — 6360000002 HC RX W HCPCS: Performed by: HOSPITALIST

## 2021-01-03 PROCEDURE — 85025 COMPLETE CBC W/AUTO DIFF WBC: CPT

## 2021-01-03 PROCEDURE — 2000000000 HC ICU R&B

## 2021-01-03 RX ORDER — LEVETIRACETAM 250 MG/1
750 TABLET ORAL 2 TIMES DAILY
Status: DISCONTINUED | OUTPATIENT
Start: 2021-01-03 | End: 2021-01-05 | Stop reason: HOSPADM

## 2021-01-03 RX ADMIN — Medication 10 ML: at 09:18

## 2021-01-03 RX ADMIN — POTASSIUM CHLORIDE 40 MEQ: 1500 TABLET, EXTENDED RELEASE ORAL at 05:02

## 2021-01-03 RX ADMIN — ENOXAPARIN SODIUM 40 MG: 40 INJECTION SUBCUTANEOUS at 09:15

## 2021-01-03 RX ADMIN — FAMOTIDINE 20 MG: 10 INJECTION, SOLUTION INTRAVENOUS at 22:00

## 2021-01-03 RX ADMIN — METOPROLOL TARTRATE 25 MG: 25 TABLET, FILM COATED ORAL at 21:29

## 2021-01-03 RX ADMIN — LEVETIRACETAM 750 MG: 100 INJECTION, SOLUTION INTRAVENOUS at 09:17

## 2021-01-03 RX ADMIN — LEVETIRACETAM 750 MG: 250 TABLET ORAL at 21:29

## 2021-01-03 RX ADMIN — Medication 100 MG: at 09:16

## 2021-01-03 RX ADMIN — METOPROLOL TARTRATE 25 MG: 25 TABLET, FILM COATED ORAL at 09:15

## 2021-01-03 RX ADMIN — RISPERIDONE 1 MG: 1 TABLET ORAL at 21:29

## 2021-01-03 RX ADMIN — FAMOTIDINE 20 MG: 10 INJECTION, SOLUTION INTRAVENOUS at 09:15

## 2021-01-03 RX ADMIN — THERA TABS 1 TABLET: TAB at 09:15

## 2021-01-03 RX ADMIN — CEFTRIAXONE 2 G: 2 INJECTION, POWDER, FOR SOLUTION INTRAMUSCULAR; INTRAVENOUS at 16:29

## 2021-01-03 ASSESSMENT — PAIN SCALES - GENERAL
PAINLEVEL_OUTOF10: 0

## 2021-01-03 NOTE — PROGRESS NOTES
Verbal order rec'd per Dr. Alok Sánchez: OK to remove españa catheter. [place] removed, pt instructed in use of urinal for accurate I/O's. Pt stated understanding, tolerated well.     Elly Rosales  1/3/2021

## 2021-01-03 NOTE — PROGRESS NOTES
Pt rested well this afternoon. Pt still pleasantly disoriented to situation and date/time. Unable to tell RN what month/year it is, simply stating \"winter time\" when asked.

## 2021-01-04 LAB
ANION GAP SERPL CALCULATED.3IONS-SCNC: 6 MMOL/L (ref 3–16)
BASOPHILS ABSOLUTE: 0.1 K/UL (ref 0–0.2)
BASOPHILS RELATIVE PERCENT: 1.2 %
BLOOD CULTURE, ROUTINE: ABNORMAL
BLOOD CULTURE, ROUTINE: ABNORMAL
BLOOD CULTURE, ROUTINE: NORMAL
BLOOD CULTURE, ROUTINE: NORMAL
BUN BLDV-MCNC: <2 MG/DL (ref 7–20)
CALCIUM SERPL-MCNC: 8.4 MG/DL (ref 8.3–10.6)
CHLORIDE BLD-SCNC: 102 MMOL/L (ref 99–110)
CO2: 29 MMOL/L (ref 21–32)
CREAT SERPL-MCNC: 0.7 MG/DL (ref 0.9–1.3)
EOSINOPHILS ABSOLUTE: 0.4 K/UL (ref 0–0.6)
EOSINOPHILS RELATIVE PERCENT: 8.5 %
GFR AFRICAN AMERICAN: >60
GFR NON-AFRICAN AMERICAN: >60
GLUCOSE BLD-MCNC: 131 MG/DL (ref 70–99)
HCT VFR BLD CALC: 30.2 % (ref 40.5–52.5)
HEMOGLOBIN: 9.6 G/DL (ref 13.5–17.5)
LYMPHOCYTES ABSOLUTE: 1.9 K/UL (ref 1–5.1)
LYMPHOCYTES RELATIVE PERCENT: 37.8 %
MAGNESIUM: 1.3 MG/DL (ref 1.8–2.4)
MCH RBC QN AUTO: 29.7 PG (ref 26–34)
MCHC RBC AUTO-ENTMCNC: 31.9 G/DL (ref 31–36)
MCV RBC AUTO: 93.2 FL (ref 80–100)
MONOCYTES ABSOLUTE: 1 K/UL (ref 0–1.3)
MONOCYTES RELATIVE PERCENT: 19.5 %
NEUTROPHILS ABSOLUTE: 1.7 K/UL (ref 1.7–7.7)
NEUTROPHILS RELATIVE PERCENT: 33 %
ORGANISM: ABNORMAL
PDW BLD-RTO: 15.5 % (ref 12.4–15.4)
PLATELET # BLD: 130 K/UL (ref 135–450)
PMV BLD AUTO: 7.8 FL (ref 5–10.5)
POTASSIUM REFLEX MAGNESIUM: 3.4 MMOL/L (ref 3.5–5.1)
RBC # BLD: 3.24 M/UL (ref 4.2–5.9)
SODIUM BLD-SCNC: 137 MMOL/L (ref 136–145)
WBC # BLD: 5 K/UL (ref 4–11)

## 2021-01-04 PROCEDURE — 6360000002 HC RX W HCPCS: Performed by: INTERNAL MEDICINE

## 2021-01-04 PROCEDURE — 85025 COMPLETE CBC W/AUTO DIFF WBC: CPT

## 2021-01-04 PROCEDURE — 6370000000 HC RX 637 (ALT 250 FOR IP): Performed by: INTERNAL MEDICINE

## 2021-01-04 PROCEDURE — 83735 ASSAY OF MAGNESIUM: CPT

## 2021-01-04 PROCEDURE — 80048 BASIC METABOLIC PNL TOTAL CA: CPT

## 2021-01-04 PROCEDURE — 2580000003 HC RX 258: Performed by: HOSPITALIST

## 2021-01-04 PROCEDURE — 6360000002 HC RX W HCPCS: Performed by: HOSPITALIST

## 2021-01-04 PROCEDURE — 6360000002 HC RX W HCPCS

## 2021-01-04 PROCEDURE — 1200000000 HC SEMI PRIVATE

## 2021-01-04 PROCEDURE — 6370000000 HC RX 637 (ALT 250 FOR IP): Performed by: HOSPITALIST

## 2021-01-04 PROCEDURE — 92526 ORAL FUNCTION THERAPY: CPT

## 2021-01-04 PROCEDURE — 2500000003 HC RX 250 WO HCPCS: Performed by: HOSPITALIST

## 2021-01-04 PROCEDURE — 99233 SBSQ HOSP IP/OBS HIGH 50: CPT | Performed by: INTERNAL MEDICINE

## 2021-01-04 RX ORDER — POTASSIUM CHLORIDE 20 MEQ/1
40 TABLET, EXTENDED RELEASE ORAL 2 TIMES DAILY WITH MEALS
Status: DISCONTINUED | OUTPATIENT
Start: 2021-01-04 | End: 2021-01-05 | Stop reason: HOSPADM

## 2021-01-04 RX ORDER — AMOXICILLIN 500 MG/1
1000 CAPSULE ORAL 3 TIMES DAILY
Qty: 84 CAPSULE | Refills: 0 | Status: SHIPPED | OUTPATIENT
Start: 2021-01-04 | End: 2021-01-18

## 2021-01-04 RX ORDER — POTASSIUM CHLORIDE 29.8 MG/ML
INJECTION INTRAVENOUS
Status: COMPLETED
Start: 2021-01-04 | End: 2021-01-04

## 2021-01-04 RX ORDER — LANOLIN ALCOHOL/MO/W.PET/CERES
400 CREAM (GRAM) TOPICAL 2 TIMES DAILY
Status: DISCONTINUED | OUTPATIENT
Start: 2021-01-04 | End: 2021-01-05 | Stop reason: HOSPADM

## 2021-01-04 RX ORDER — GREEN TEA/HOODIA GORDONII 315-12.5MG
1 CAPSULE ORAL 2 TIMES DAILY
Qty: 30 TABLET | Refills: 0 | Status: SHIPPED | OUTPATIENT
Start: 2021-01-04 | End: 2021-01-19

## 2021-01-04 RX ORDER — MAGNESIUM SULFATE IN WATER 40 MG/ML
4 INJECTION, SOLUTION INTRAVENOUS ONCE
Status: COMPLETED | OUTPATIENT
Start: 2021-01-04 | End: 2021-01-04

## 2021-01-04 RX ADMIN — Medication 20 MEQ: at 09:58

## 2021-01-04 RX ADMIN — Medication 100 MG: at 09:40

## 2021-01-04 RX ADMIN — Medication: at 11:44

## 2021-01-04 RX ADMIN — Medication 400 MG: at 16:52

## 2021-01-04 RX ADMIN — FAMOTIDINE 20 MG: 10 INJECTION, SOLUTION INTRAVENOUS at 09:37

## 2021-01-04 RX ADMIN — Medication 10 ML: at 00:37

## 2021-01-04 RX ADMIN — METOPROLOL TARTRATE 25 MG: 25 TABLET, FILM COATED ORAL at 09:38

## 2021-01-04 RX ADMIN — POTASSIUM CHLORIDE 40 MEQ: 1500 TABLET, EXTENDED RELEASE ORAL at 16:52

## 2021-01-04 RX ADMIN — FAMOTIDINE 20 MG: 10 INJECTION, SOLUTION INTRAVENOUS at 21:08

## 2021-01-04 RX ADMIN — LEVETIRACETAM 750 MG: 250 TABLET ORAL at 09:37

## 2021-01-04 RX ADMIN — Medication 400 MG: at 21:08

## 2021-01-04 RX ADMIN — Medication 10 ML: at 21:09

## 2021-01-04 RX ADMIN — METOPROLOL TARTRATE 25 MG: 25 TABLET, FILM COATED ORAL at 21:08

## 2021-01-04 RX ADMIN — Medication 10 ML: at 09:39

## 2021-01-04 RX ADMIN — ENOXAPARIN SODIUM 40 MG: 40 INJECTION SUBCUTANEOUS at 09:39

## 2021-01-04 RX ADMIN — THERA TABS 1 TABLET: TAB at 09:38

## 2021-01-04 RX ADMIN — MAGNESIUM SULFATE HEPTAHYDRATE 4 G: 40 INJECTION, SOLUTION INTRAVENOUS at 09:58

## 2021-01-04 RX ADMIN — CEFTRIAXONE 2 G: 2 INJECTION, POWDER, FOR SOLUTION INTRAMUSCULAR; INTRAVENOUS at 11:53

## 2021-01-04 RX ADMIN — RISPERIDONE 1 MG: 1 TABLET ORAL at 21:34

## 2021-01-04 RX ADMIN — LEVETIRACETAM 750 MG: 250 TABLET ORAL at 21:08

## 2021-01-04 ASSESSMENT — ENCOUNTER SYMPTOMS
BACK PAIN: 0
TROUBLE SWALLOWING: 0
COUGH: 0
DIARRHEA: 0
WHEEZING: 0
SORE THROAT: 0
SHORTNESS OF BREATH: 0
CONSTIPATION: 0
NAUSEA: 0
RHINORRHEA: 0
EYE REDNESS: 0
ABDOMINAL PAIN: 0
EYE DISCHARGE: 0

## 2021-01-04 ASSESSMENT — PAIN SCALES - WONG BAKER: WONGBAKER_NUMERICALRESPONSE: 0

## 2021-01-04 ASSESSMENT — PAIN SCALES - GENERAL
PAINLEVEL_OUTOF10: 0
PAINLEVEL_OUTOF10: 0

## 2021-01-04 NOTE — PROGRESS NOTES
[facility]    1/4/2021 1:00 PM        Name: Ruel Bhatia . Admitted: 12/29/2020  Primary Care Provider: No primary care provider on file. (Tel: None)      Chief Complaint   Patient presents with    Seizures     pt had three witnesses seizures per squad. pt with hx of seizures. pt post ictal upon squad arrival and upon pt arrival to ER. pt appears confused and restless. Brief History: Patient is a 61 yo male with hx seizure, ETOH abuse, tobacco use. He presented to ER per EMS after having multiple seizures. He was confused and agitated in ER requiring restraints, and needing Precedex infusion. CT head with no acute abnormality. COVID-19 tested negative. Noted to have strep viridans bacteremia. ID service consulted. On IV ceftriaxone 2 g every 24 hours. Awaiting final ID recommendations and PICC line placement prior to discharge. Subjective: Patient reports being upset about prolonged hospitalization. Needed orientation on the need for continued stay. Scheduled to receive IV magnesium and oral/IV potassium. No acute events overnight. Patient offers no other complaints.     Reviewed interval ancillary notes    Current Medications      magnesium sulfate 4 g in 100 mL IVPB premix, Once      levETIRAcetam (KEPPRA) tablet 750 mg, BID      risperiDONE (RISPERDAL) tablet 1 mg, Nightly      metoprolol tartrate (LOPRESSOR) tablet 25 mg, BID      thiamine mononitrate tablet 100 mg, Daily      magnesium sulfate 1 g in dextrose 5% 100 mL IVPB, PRN      potassium chloride 10 mEq/100 mL IVPB (Peripheral Line), PRN      influenza quadrivalent split vaccine (FLUZONE;FLUARIX;FLULAVAL;AFLURIA) injection 0.5 mL, Prior to discharge      sodium chloride flush 0.9 % injection 10 mL, 2 times per day      sodium chloride flush 0.9 % injection 10 mL, PRN      enoxaparin (LOVENOX) injection 40 mg, Daily      promethazine (PHENERGAN) tablet 12.5 mg, Q6H PRN    Or      ondansetron (ZOFRAN) injection 4 mg, Q6H PRN      polyethylene glycol (GLYCOLAX) packet 17 g, Daily PRN      acetaminophen (TYLENOL) tablet 650 mg, Q6H PRN    Or      acetaminophen (TYLENOL) suppository 650 mg, Q6H PRN      potassium chloride (KLOR-CON M) extended release tablet 40 mEq, PRN    Or      potassium bicarb-citric acid (EFFER-K) effervescent tablet 40 mEq, PRN    Or      potassium chloride 10 mEq/100 mL IVPB (Peripheral Line), PRN      famotidine (PEPCID) injection 20 mg, BID      LORazepam (ATIVAN) injection 2 mg, Q4H PRN      cefTRIAXone (ROCEPHIN) 2 g IVPB in D5W 50ml minibag, Q24H      benzonatate (TESSALON) capsule 100 mg, TID PRN      multivitamin 1 tablet, Daily      LORazepam (ATIVAN) tablet 1 mg, Q1H PRN    Or      LORazepam (ATIVAN) injection 1 mg, Q1H PRN    Or      LORazepam (ATIVAN) tablet 2 mg, Q1H PRN    Or      LORazepam (ATIVAN) injection 2 mg, Q1H PRN    Or      LORazepam (ATIVAN) tablet 3 mg, Q1H PRN    Or      LORazepam (ATIVAN) injection 3 mg, Q1H PRN    Or      LORazepam (ATIVAN) tablet 4 mg, Q1H PRN    Or      LORazepam (ATIVAN) injection 4 mg, Q1H PRN        Objective:  /69   Pulse 67   Temp 97.9 °F (36.6 °C) (Temporal)   Resp 12   Ht 5' 11\" (1.803 m)   Wt 116 lb 2.9 oz (52.7 kg)   SpO2 100%   BMI 16.20 kg/m²     Intake/Output Summary (Last 24 hours) at 1/4/2021 1300  Last data filed at 1/4/2021 1155  Gross per 24 hour   Intake 290 ml   Output 1440 ml   Net -1150 ml      Wt Readings from Last 3 Encounters:   01/04/21 116 lb 2.9 oz (52.7 kg)   12/27/19 150 lb (68 kg)   05/17/18 136 lb 3.9 oz (61.8 kg)       General appearance: Frail -American male, awake and oriented  Cardiovascular: Sinus rhythm on tele. Regular rhythm, normal S1, S2. No murmur. No edema in lower extremities  Respiratory: Not using accessory muscles. Diminished throughout.  O2 sats high 90s room air   GI: Abdomen soft, not distended, normal bowel sounds  Musculoskeletal: no obvious bony deformities  Neurology: Cranial nerves grossly intact, no focal sensory or motor deficits  Skin: Warm, dry, normal turgor    Labs and Tests:  CBC:   Recent Labs     01/02/21  0624 01/03/21  0415 01/04/21  0525   WBC 5.1 4.6 5.0   HGB 9.1* 9.7* 9.6*   * 128* 130*     BMP:    Recent Labs     01/02/21  0624 01/02/21  1115 01/03/21  0415 01/04/21  0525     --  140 137   K 3.3* 3.5 3.3* 3.4*     --  102 102   CO2 27  --  30 29   BUN 3*  --  <2* <2*   CREATININE 0.8*  --  0.8* 0.7*   GLUCOSE 128*  --  169* 131*     Hepatic:   No results for input(s): AST, ALT, ALB, BILITOT, ALKPHOS in the last 72 hours. CXR 12/29/2020:  Scarring/atelectatic changes seen overlying the lateral aspect of the right   mid to lower lung field and right lung base.  Possible scarring and bullous   changes of the left lung apex is redemonstrated.  No confluent airspace   opacity or pleural effusion is seen. CT Head 12/30/2020:  No acute intracranial abnormality.       Moderate white matter disease, likely chronic small vessel ischemia. EEG 12/30/2020: Impression: This EEG is abnormal.  The generalized diffuse slowing is suggestive of mild to moderate diffuse encephalopathy. There is no evidence of epileptiform discharges, focal, or lateralizing abnormalities.      Problem List  Active Problems:    Partial epilepsy with impairment of consciousness, intractable (HCC)    Personal history of noncompliance with medical treatment    Seizure secondary to subtherapeutic anticonvulsant medication ([address]    Partial symptomatic epilepsy with complex partial seizures, intractable, without status epilepticus ([address]    Noncompliance with medication regimen    Seizures ([address]    Alcohol abuse    Breakthrough seizure ([address]    Acute encephalopathy    Streptococcal bacteremia    Streptococcus viridans infection    Drug or alcohol risk assessment or counseling    Tobacco abuse counseling  Resolved Problems:    * No resolved hospital problems. *       Assessment & Plan:     Strep viridans bacteremia:   Noted to have sepsis on admission with fever, tachycardia, tachypnea and lactic acidosis. ID service consulted. Started on IV ceftriaxone 2 g every 24 hours. Leukocytosis resolved. Last repeat blood cultures from 1/1/2021 are negative to date. As per ID recs, 2d-Echo ordered to r/o endocarditis, holding PICC line palcement until repeat blood c/s negative. Right lower lobe pneumonia:  Likely gram-positive with Streptococcus viridans. On IV ceftriaxone for strep viridans bacteremia. ID service following. Recurrent seizures:   Possibly secondary to noncompliance, Keppra level < 2.0 on presentation. Continue oral Keppra 750 mg po bid. Appreciate neurology recs. EEG showed evidence of mild to moderate diffuse acute encephalopathy without evidence of epileptiform discharges. Acute encephalopathy: resolved  Etiology likely metabolic secondary to sepsis and possible seizures. CT head with no acute abnormality. EEG with mild to moderate diffuse encephalopathy. Neurology on board, considered less likely meningoencephalitis. Taking off of Precedex. Hypomagnesemia:  Serum magnesium level of 1.3. Replacing IV as per protocol. Started on oral mag oxide 400 mg p.o. twice daily. Hypokalemia:  Serum potassium level 3.4. Needing replacement as needed since admission. Started on KCl 40 mEq p.o. twice daily starting tonight. Sinus tachycardia:   Clinically euvolemic. on low dose Metoprolol. H/o ETOH abuse. Blood ETOH negative, urine drug screen negative. Thiamine and MV tab po qd. Diet: DIET DYSPHAGIA SOFT AND BITE-SIZED;   Code:Full Code  DVT PPX: enoxaparin  Dispo: 2d-Echo and PICC line placement when ID clears based on blood cultures      Zulema Lindsey MD   1/4/2021 1:00 PM

## 2021-01-04 NOTE — PROGRESS NOTES
Infectious Diseases   Progress Note      Admission Date: 12/29/2020  Hospital Day: Hospital Day: 7   Attending: Elma Walton MD  Date of service: 1/4/2021     Chief complaint/ Reason for consult:     · Sepsis with high fever, bandemia, metabolic encephalopathy, hypotension  · Severe lactic acidosis with serum lactate of 6.2 on admission  · Streptococcus viridans bacteremia  · Right lower lobe pneumonia  · Postictal state on presentation  · Negative urine tox screen    Microbiology:        I have reviewed allavailable micro lab data and cultures    Blood culture (2/2) - collected on 12/29/2020: Streptococcus viridans    Antibiotics and immunizations:       Current antibiotics: All antibiotics and their doses were reviewed by me    Recent Abx Admin                   cefTRIAXone (ROCEPHIN) 2 g IVPB in D5W 50ml minibag (g) 2 g New Bag 01/04/21 1153     2 g New Bag 01/03/21 1629                  Immunization History: All immunization history was reviewed by me today. There is no immunization history for the selected administration types on file for this patient. Known drug allergies: All allergies were reviewed and updated    Allergies   Allergen Reactions    Carbamazepine Other (See Comments)     Thrombocytopenia    Codeine Itching     Per ER    Phenytoin Itching    Sulfa Antibiotics      Per ER       Social history:     Social History:  All social andepidemiologic history was reviewed and updated by me today as needed. · Tobacco use:   reports that he has been smoking. He has a 40.00 pack-year smoking history. He has never used smokeless tobacco.  · Alcohol use:   reports current alcohol use of about 2.0 standard drinks of alcohol per week. · Currently lives in: [place] [id number]  ·  reports current drug use. Drug: Marijuana. Assessment:     The patient is a 60 y.o. old male who  has a past medical history of Alcohol abuse and Seizures ([address]  with following problems:    · Sepsis with high fever, bandemia, metabolic encephalopathy, hypotension-resolved  · Severe lactic acidosis with serum lactate of 6.2 on admission-in setting of bacteremia and post ictal state-this is resolved  · Streptococcus viridans bacteremia -covered with Rocephin, endocarditis has been ruled out  · Right lower lobe pneumonia-covered with ceftriaxone  · Postictal state on presentation  · Negative urine tox screen  · History of marijuana abuse-counseling done  · Chronic small vessel ischemia  · History of alcohol abuse-counseling done  · Smoker-counseling done      Discussion:      His blood culture from 12/30/2020 was positive for Streptococcus viridans. 1 set of blood cultures also came back positive for Rothia and micrococcus. Both are likely contaminant from the skin. He is on IV ceftriaxone for Streptococcus viridans bacteremia coverage. Repeat blood cultures from 12/31/2020 remain negative    Serum creatinine 0.7. White cell count is 5000.    2D echo from 1-2-21 had already ruled out endocarditis. Plan:     Diagnostic Workup:      · Continue to follow  fever curve, WBC count and blood cultures. · Continue to monitor blood counts, liver and renal function. Antimicrobials:    · Will continue IV ceftriaxone while in the hospital  · The patient is clinically improving  · Recommend switching him to oral amoxicillin 1 g every 8 hour at discharge for a 2-week course  · Recommend taking oral probiotic twice daily  · We will follow up on the culture results and clinical progress and will make further recommendations accordingly. · Continue close vitals monitoring. · Maintain good glycemic control. · Fall precautions. Aspiration precautions. · Continue to watch for new fever or diarrhea. · DVT prophylaxis. · Discussed all above with patient and RN.       Drug Monitoring:    · Continue monitoring for antibiotic toxicity as follows: CBC, CMP   · Continue to watch for following: new or worsening fever, new hypotension, hives, lip swelling and redness or purulence at vascular access sites. I/v access Management:    · Continue to monitor i.v access sites for erythema, induration, discharge or tenderness. · As always, continue efforts to minimize tubes/lines/drains as clinically appropriate to reduce chances of line associated infections. Patient education and counseling:       · The patient was educated in detail about the side-effects of various antibiotics and things to watch for like new rashes, lip swelling, severe reaction, worsening diarrhea, break through fever etc.  · Discussed patient's condition and what to expect. All of the patient's questions were addressed in a satisfactory manner and patient verbalized understanding all instructions. Alcohol use disorder counseling:     Patient was counseled about harms of drinking too much alcohol. CDC data shows that in addition to liver problems and other health issues, heavy alcohol use is dangerous and can increased the likelihood of heart disease, breast cancer, sexually transmitted diseases, unintended pregnancy, fetal alcohol spectrum disorders, motor-vehicle crashes, and violence. Patient was advised to cut down on alcohol use and was advised to see a counselor. Information was given about Alcoholics anonymous (www.aa.org) and [facility] addiction services help-line 6-239.257.6670. TIME SPENT TODAY:     - Spent over  35  minutes on visit (including interval history, physical exam, review of data including labs, cultures, imaging, development and implementation of treatment plan and coordination of complex care). More than 50 percent of this includes face-to-face time spent with the patient for counseling and coordination of care. Thanks for allowing me to participate in your patient's care.  I will sign off today, but will be available to answer any further questions or concerns that may arise during patient's stay in the \[facility] [place] [facility]\"".          Subjective: Interval history: Interval history was obtained from chart review and patient/ RN. The patient is on IV Rocephin. He is tolerating antibiotics okay. He is afebrile     REVIEW OF SYSTEMS:      Review of Systems   Constitutional: Negative for chills, diaphoresis and fever. HENT: Negative for ear discharge, ear pain, rhinorrhea, sore throat and trouble swallowing. Eyes: Negative for discharge and redness. Respiratory: Negative for cough, shortness of breath and wheezing. Cardiovascular: Negative for chest pain and leg swelling. Gastrointestinal: Negative for abdominal pain, constipation, diarrhea and nausea. Endocrine: Negative for polyuria. Genitourinary: Negative for dysuria, flank pain, frequency, hematuria and urgency. Musculoskeletal: Negative for back pain and myalgias. Skin: Negative for rash. Neurological: Negative for dizziness, seizures and headaches. Hematological: Does not bruise/bleed easily. Psychiatric/Behavioral: Negative for hallucinations and suicidal ideas. All other systems reviewed and are negative. Past Medical History: All past medical history reviewed today. Past Medical History:   Diagnosis Date    Alcohol abuse     Seizures ([facility]        Past Surgical History: All past surgical history was reviewed today. History reviewed. No pertinent surgical history. Family History: All family history was reviewed today. History reviewed. No pertinent family history. Objective:       PHYSICAL EXAM:      Vitals:   Vitals:    01/04/21 0012 01/04/21 0305 01/04/21 0900 01/04/21 1155   BP: 129/77 134/72 (!) 117/58 129/69   Pulse: 76 86 80 67   Resp:  18 18 12   Temp:  98 °F (36.7 °C) 98 °F (36.7 °C) 97.9 °F (36.6 °C)   TempSrc:  Temporal Temporal Temporal   SpO2:  95% 100% 100%   Weight:  116 lb 2.9 oz (52.7 kg)     Height:           Physical Exam  Vitals signs and nursing note reviewed.    Constitutional:       Appearance: Normal appearance. He is well-developed. HENT:      Head: Normocephalic and atraumatic. Right Ear: External ear normal.      Left Ear: External ear normal.      Nose: Nose normal. No congestion or rhinorrhea. Mouth/Throat:      Mouth: Mucous membranes are moist.      Pharynx: No oropharyngeal exudate or posterior oropharyngeal erythema. Eyes:      General: No scleral icterus. Right eye: No discharge. Left eye: No discharge. Conjunctiva/sclera: Conjunctivae normal.      Pupils: Pupils are equal, round, and reactive to light. Neck:      Musculoskeletal: Normal range of motion and neck supple. No neck rigidity or muscular tenderness. Cardiovascular:      Rate and Rhythm: Normal rate and regular rhythm. Pulses: Normal pulses. Heart sounds: No murmur. No friction rub. Pulmonary:      Effort: Pulmonary effort is normal. No respiratory distress. Breath sounds: Normal breath sounds. No stridor. No wheezing, rhonchi or rales. Abdominal:      General: Bowel sounds are normal.      Palpations: Abdomen is soft. Tenderness: There is no abdominal tenderness. There is no right CVA tenderness, left CVA tenderness, guarding or rebound. Musculoskeletal: Normal range of motion. General: No swelling or tenderness. Lymphadenopathy:      Cervical: No cervical adenopathy. Skin:     General: Skin is warm and dry. Coloration: Skin is not jaundiced. Findings: No erythema or rash. Neurological:      General: No focal deficit present. Mental Status: He is alert and oriented to person, place, and time. Mental status is at baseline. Motor: No abnormal muscle tone. Psychiatric:         Mood and Affect: Mood normal.         Behavior: Behavior normal.         Thought Content: Thought content normal.       ''              Intake and output:    I/O last 3 completed shifts:   In: 20 [I.V.:20]  Out: 1115 [Urine:1115]    Lab Data:   All available labs and old records have been reviewed by me. CBC:  Recent Labs     01/02/21  0624 01/03/21  0415 01/04/21  0525   WBC 5.1 4.6 5.0   RBC 3.05* 3.25* 3.24*   HGB 9.1* 9.7* 9.6*   HCT 28.3* 30.1* 30.2*   * 128* 130*   MCV 93.0 92.7 93.2   MCH 29.7 29.9 29.7   MCHC 31.9 32.2 31.9   RDW 15.2 15.2 15.5*        BMP:  Recent Labs     01/02/21  0624 01/02/21  1115 01/03/21  0415 01/04/21  0525     --  140 137   K 3.3* 3.5 3.3* 3.4*     --  102 102   CO2 27  --  30 29   BUN 3*  --  <2* <2*   CREATININE 0.8*  --  0.8* 0.7*   CALCIUM 8.2*  --  8.5 8.4   GLUCOSE 128*  --  169* 131*        Hepatic Function Panel:   Lab Results   Component Value Date    ALKPHOS 92 01/01/2021    ALT 13 01/01/2021    AST 47 01/01/2021    PROT 7.4 01/01/2021    BILITOT 0.8 01/01/2021    BILIDIR 0.4 01/01/2021    IBILI 0.4 01/01/2021    LABALBU 2.3 01/01/2021       CPK:   Lab Results   Component Value Date    CKTOTAL 118 12/29/2020     ESR: No results found for: SEDRATE  CRP: No results found for: CRP        Imaging: All pertinent images and reports for the current visit were reviewed by me during this visit. XR CHEST PORTABLE   Final Result   Slightly increased reticular opacities suggest vascular congestion/pulmonary   edema. This may be superimposed upon chronic interstitial change. Small   right pleural effusion with asymmetric airspace change at the right base that   may represent atelectasis or edema. Underlying pneumonitis cannot be   excluded. CT HEAD WO CONTRAST   Final Result   No acute intracranial abnormality. Moderate white matter disease, likely chronic small vessel ischemia. XR CHEST PORTABLE   Final Result   Scarring/atelectatic changes seen overlying the lateral aspect of the right   mid to lower lung field and right lung base. Possible scarring and bullous   changes of the left lung apex is redemonstrated. No confluent airspace   opacity or pleural effusion is seen. counseling Z71.6       Please note that this chart was generated using Dragon dictation software. Although every effort was made to ensure the accuracy of this automated transcription, some errors in transcription may have occurred inadvertently. If you may need any clarification, please do not hesitate to contact me through EPIC or at the phone number provided below with my electronic signature. Any pictures or media included in this note were obtained after taking informed verbal consent from the patient and with their approval to include those in the patient's medical record.     Allyson Morales MD, MPH  1/4/2021 , 1:05 PM   [facility] Infectious Disease   [address] [place] [address], [address]  Office: [phone]  Fax: [phone]  Clinic days:  Tuesday & Thursday

## 2021-01-04 NOTE — PROGRESS NOTES
Comprehensive Nutrition Assessment    Type and Reason for Visit:  Initial    Nutrition Recommendations/Plan:   Ensure Enlive TID      Nutrition Assessment:  LOS nutrition assessment. At time of RD visit, pt's PO diet was just advanced to dysphagia soft and bite sized per SLP. RN reports PO intake has been ~25% at each meal d/t pt disliking diet. Today pt reports good appetite and states he was eating well prior to admission. Question accuracy as pt's BMI is 16, indicating most likely a hx of inadequate oral intake d/t etoh abuse / etoh replacing meals. Pt agreed to oral nutrition supplement. Pt denied wt changes. Malnutrition Assessment:  Malnutrition Status:  Severe malnutrition    Context:  Chronic Illness     Findings of the 6 clinical characteristics of malnutrition:  Energy Intake:  7 - 50% or less of estimated energy requirements for 5 or more days  Weight Loss:  Unable to assess     Body Fat Loss:  7 - Moderate body fat loss Triceps   Muscle Mass Loss:  7 - Moderate muscle mass loss Clavicles (pectoralis & deltoids), Temples (temporalis), Calf (gastrocnemius)  Fluid Accumulation:  No significant fluid accumulation     Strength:  Not Performed    Estimated Daily Nutrient Needs:  Energy (kcal):  1590 - 1855; Weight Used for Energy Requirements:  Current(53kg)     Protein (g):  64 - 126; Weight Used for Protein Requirements:  Current(53kg / 1.2 - 2.0g)        Fluid (ml/day):   ; Method Used for Fluid Requirements:  1 ml/kcal      Nutrition Related Findings:  disoriented to time and situation; -6L since admission      Wounds:          Current Nutrition Therapies:    DIET DYSPHAGIA SOFT AND BITE-SIZED;     Anthropometric Measures:  · Height: 5' 11\" (180.3 cm)  · Current Body Weight: 116 lb (52.6 kg)   · Admission Body Weight: 116 lb (52.6 kg)    · Ideal Body Weight: 172 lbs; % Ideal Body Weight 67.4 %   · BMI: 16.2  · BMI Categories: Underweight (BMI less than 22) age over 72       Nutrition Diagnosis:   · Inadequate oral intake related to inadequate protein-energy intake as evidenced by (disliked food choices)      Nutrition Interventions:   Food and/or Nutrient Delivery:  Continue Current Diet, Start Oral Nutrition Supplement  Nutrition Education/Counseling:  Education not indicated   Coordination of Nutrition Care:  Continue to monitor while inpatient    Goals:  po intake 50% or greater       Nutrition Monitoring and Evaluation:   Behavioral-Environmental Outcomes:  None Identified   Food/Nutrient Intake Outcomes:  Food and Nutrient Intake, Supplement Intake  Physical Signs/Symptoms Outcomes:  Nutrition Focused Physical Findings, Weight     Discharge Planning:    Continue Oral Nutrition Supplement, Continue current diet     Electronically signed by Prema Hopkins RD, LD on 1/4/21 at 2:52 PM EST  Contact: [phone]

## 2021-01-04 NOTE — PROGRESS NOTES
[employer]  Requested to check home infusion benefits. Referral to Jw/Nancy [phone] to run benefits per payer. Discharge planner notified. Addendum: Spoke with sister Francoise Gomez re: home care and plan of care. Agreeable, willing to learn home infusion. Francoise Gomez states pt will stay with her while receiving home infusion. Address: [address], [address]  C# [phone]. Demographic's verified. Will follow for home care. Discharge planner notified. Addendum: Notified by Mario Alberto/discharge planner that pt may need ecf. Will follow.

## 2021-01-04 NOTE — PROGRESS NOTES
Speech Language Pathology  Dysphagia Treatment Note    Name:  Octavio Maddox  :   1965  Medical Diagnosis:  Seizure ([address] [R56.9]  Treatment Diagnosis: Oropharyngeal Dysphagia  Pain level:  Pt denied     Current Diet Level: Dysphagia II Minced and Moist with thin liquids, meds as tolerated per BSE completed 20. Tolerance of Current Diet Level: Per chart review pt tolerating current diet with no noted difficulty. Pt denies difficulty. Assessment of Texture Tolerance:  -Impressions: Pt found lying back in bed resting easily, able to be awakened with verbal cue. Pt pleasant and cooperative. Oriented to general location (hospital) and situation. When asked current month pt stated SHUKRI. Pt oriented to name, , and age. Pt stated he lives at a group home. Pt oriented to time of day but had difficulty reading an analog clock (hand confusion). Pt reports he thinks he is back to normal with his cognition/ reports he generally doesn't know month (did not improve with f/3), SHUKRI, date and has to ask staff at his group home. Did not complete full speech language eval 2/2 not knowing pt's cognitive baseline with pt reporting he feels back to baseline. May be warranted if pt/ family report increased difficulty from baseline. Placed HOB upright for po trials for potential diet advancement. Provided regular solid trials (giles cracker) and carbonated thins via straw. Pt with prolonged mastication suspect due to poor dentition with adequate oral clearance. Pt produced x 2 strong delayed coughs, suspect due to reduced pharyngeal clearance. Pt seemingly tolerated subsequent trials of thins via straw and regular solids with no noted difficulty or overt clinical s/s of penetration/ aspiration. Recommend diet upgrade to suspected baseline diet (softer solids) at this time.      Diet and Treatment Recommendations:  - Recommend diet upgrade to dysphagia III (soft and bite sized) diet (suspect closer to pt's baseline diet)   - Continue with thins  - Meds as tolerated     ST.) Pt will tolerate recommended diet without s/s of aspiration. 2021 Ongoing      Plan:  Continued daily Dysphagia treatment with goals per plan of care. 1-2 f/u to determine diet tolerance. Patient/Family Education:Education given to the Pt and nurse, who verbalized understanding    Discharge Recommendations:  Pt will benefit from continued skilled Speech Therapy for Dysphagia services, prior to returning home. Timed Code Treatment: 0     Total Treatment Time: 19     If patient discharges prior to next session this note will serve as a discharge summary. Signature:   Lizbeth LOUIS  CCC-SLP #[id number] 2021 10:49 AM  Speech-Language Pathologist

## 2021-01-04 NOTE — DISCHARGE INSTR - COC
Continuity of Care Form    Patient Name: Yesi Banks   :  1965  MRN:  [id number]    Admit date:  2020  Discharge date: 21    Code Status Order: Full Code   Advance Directives:     Admitting Physician:  Josephine Sorto MD  PCP: No primary care provider on file. Discharging Nurse: [facility] Unit/Room#: CVU-2907/2907-01  Discharging Unit Phone Number: [phone]    Emergency Contact:   Extended Emergency Contact Information  Primary Emergency Contact: Dmitriy Vogel  Address: -[address] Phone: [phone]  Relation: Other  Secondary Emergency Contact: Rossy Beasley  Home Phone: [phone]  Relation: Parent    Past Surgical History:  History reviewed. No pertinent surgical history. Immunization History: There is no immunization history for the selected administration types on file for this patient.     Active Problems:  Patient Active Problem List   Diagnosis Code    Seizure secondary to subtherapeutic anticonvulsant medication ([address] R56.9, Z79.899    Partial symptomatic epilepsy with complex partial seizures, intractable, without status epilepticus ([address] G40.219    Noncompliance with medication regimen Z91.14    Partial epilepsy with impairment of consciousness, intractable ([address] G40.219    Personal history of noncompliance with medical treatment Z91.19    Seizures ([address] R56.9    Alcohol abuse F10.10    Pneumonia J18.9    Nausea and vomiting R11.2    Breakthrough seizure ([address] G40.919    Acute encephalopathy G93.40    Acute febrile illness R50.9    Sepsis (HCC) A41.9    Lactic acid acidosis E87.2    Smoker F17.200    Cerebrovascular small vessel disease I67.9    Streptococcal bacteremia R78.81, B95.5    Streptococcus viridans infection A49.1    Alcohol cessation counseling Z71.41    Tobacco abuse counseling Z71.6       Isolation/Infection:   Isolation          No Isolation        Patient Infection Status Infection Onset Added Last Indicated Last Indicated By Review Planned Expiration Resolved Resolved By    None active    Resolved    COVID-19 Rule Out 12/30/20 12/30/20 12/31/20 Respiratory Panel, Molecular, with COVID-19 (Restricted: peds pts or suitable admitted adults) (Ordered)   01/01/21 Rule-Out Test Resulted    COVID-19 Rule Out 12/30/20 12/30/20 12/30/20 COVID-19 (Ordered)   12/30/20 Rule-Out Test Resulted          Nurse Assessment:  Last Vital Signs: /69   Pulse 66   Temp 97.8 °F (36.6 °C) (Temporal)   Resp 12   Ht 5' 11\" (1.803 m)   Wt 116 lb 2.9 oz (52.7 kg)   SpO2 99%   BMI 16.20 kg/m²     Last documented pain score (0-10 scale): Pain Level: 0  Last Weight:   Wt Readings from Last 1 Encounters:   01/04/21 116 lb 2.9 oz (52.7 kg)     Mental Status:  oriented and alert    IV Access:  - None    Nursing Mobility/ADLs:  Walking   Independent  Transfer  Independent  Bathing  Independent  Dressing  Independent  [address]  Independent  Med Delivery   whole    Wound Care Documentation and Therapy:        Elimination:  Continence:   · Bowel: Yes  · Bladder: Yes  Urinary Catheter: None   Colostomy/Ileostomy/Ileal Conduit: No       Date of Last BM:     Intake/Output Summary (Last 24 hours) at 1/4/2021 1505  Last data filed at 1/4/2021 1155  Gross per 24 hour   Intake 530 ml   Output 1440 ml   Net -910 ml     I/O last 3 completed shifts: In: 65 [P.O.:480; IV Piggyback:50]  Out: 1440 [Urine:1440]    Safety Concerns: At Risk for Falls    Impairments/Disabilities:      cognitive deficits    Nutrition Therapy:  Current Nutrition Therapy:   - Oral Diet:  General    Routes of Feeding: Oral  Liquids: Thin Liquids  Daily Fluid Restriction: no  Last Modified Barium Swallow with Video (Video Swallowing Test): not done    Treatments at the Time of Hospital Discharge:   Respiratory Treatments:   Oxygen Therapy:  is not on home oxygen therapy.   Ventilator:    - No ventilator support    Rehab Therapies: Speech/Language Therapy and nurse  Weight Bearing Status/Restrictions: No weight bearing restirctions  Other Medical Equipment (for information only, NOT a DME order):  none  Other Treatments: ***    Patient's personal belongings (please select all that are sent with patient):  glasses, clothing, wallet    RN SIGNATURE:  Electronically signed by Roya Muhammad RN on 1/5/21 at 6:02 PM EST    CASE MANAGEMENT/SOCIAL WORK SECTION    Inpatient Status Date: ***    Readmission Risk Assessment Score:  Readmission Risk              Risk of Unplanned Readmission:        19           Discharging to Facility/ Agency     Name: Juan A Salazar will call for Appointment  Phone: [phone]  Fax: [address] [facility]  Phone: [phone]  Fax: [phone]      Dialysis Facility (if applicable)   · Name:  · Address:  · Dialysis Schedule:  · Phone:  · Fax:    / signature: {Esignature:[id number]}    PHYSICIAN SECTION    Prognosis: {Prognosis:4905762403}    Condition at Discharge: [address] [place] [address] Patient Condition:088145597}    Rehab Potential (if transferring to Rehab): {Prognosis:2457444554}    Recommended Labs or Other Treatments After Discharge: Home care    Physician Certification: I certify the above information and transfer of Katerina Womack  is necessary for the continuing treatment of the diagnosis listed and that he requires {Admit to Appropriate Level of Care:39129} for {GREATER/LESS:906882555} 30 days.      Update Admission H&P: {CHP DME Changes in [facility]:903804389}    PHYSICIAN SIGNATURE:  {Esignature:642966904}

## 2021-01-04 NOTE — PLAN OF CARE
Nutrition Problem #1: Inadequate oral intake  Intervention: Food and/or Nutrient Delivery: Continue Current Diet, Start Oral Nutrition Supplement  Nutritional Goals: po intake 50% or greater

## 2021-01-04 NOTE — PROGRESS NOTES
Report called to ARU. All questions answered. Patient will be moved to room 4912. To be transferred via wheelchair.

## 2021-01-04 NOTE — CARE COORDINATION
Chart reviewed. Updated per Dr. Libia Schultz.  Patient may be ready for d/c tomorrow. Either back to group hopme or stay with family for a day or two before. Call sister for family's wishes.  -SP

## 2021-01-04 NOTE — CARE COORDINATION
Referral received to check IV Benefits.  Patients benefit information is as follows:       Patient is covered at 100%    Electronically signed by Anh Lopez on 1/4/2021 at 4:26 PM   Cell Ph# [phone], Office # [phone]

## 2021-01-05 VITALS
WEIGHT: 116.18 LBS | TEMPERATURE: 98.9 F | HEIGHT: 71 IN | OXYGEN SATURATION: 97 % | HEART RATE: 76 BPM | RESPIRATION RATE: 16 BRPM | SYSTOLIC BLOOD PRESSURE: 109 MMHG | BODY MASS INDEX: 16.27 KG/M2 | DIASTOLIC BLOOD PRESSURE: 71 MMHG

## 2021-01-05 LAB
ANION GAP SERPL CALCULATED.3IONS-SCNC: 5 MMOL/L (ref 3–16)
BASOPHILS ABSOLUTE: 0 K/UL (ref 0–0.2)
BASOPHILS RELATIVE PERCENT: 0.7 %
BUN BLDV-MCNC: 3 MG/DL (ref 7–20)
CALCIUM SERPL-MCNC: 8.6 MG/DL (ref 8.3–10.6)
CHLORIDE BLD-SCNC: 102 MMOL/L (ref 99–110)
CO2: 29 MMOL/L (ref 21–32)
CREAT SERPL-MCNC: 0.8 MG/DL (ref 0.9–1.3)
EOSINOPHILS ABSOLUTE: 0.4 K/UL (ref 0–0.6)
EOSINOPHILS RELATIVE PERCENT: 7.9 %
GFR AFRICAN AMERICAN: >60
GFR NON-AFRICAN AMERICAN: >60
GLUCOSE BLD-MCNC: 92 MG/DL (ref 70–99)
HCT VFR BLD CALC: 29.2 % (ref 40.5–52.5)
HEMOGLOBIN: 9.4 G/DL (ref 13.5–17.5)
LYMPHOCYTES ABSOLUTE: 1.8 K/UL (ref 1–5.1)
LYMPHOCYTES RELATIVE PERCENT: 35.1 %
MCH RBC QN AUTO: 30.4 PG (ref 26–34)
MCHC RBC AUTO-ENTMCNC: 32.3 G/DL (ref 31–36)
MCV RBC AUTO: 94.2 FL (ref 80–100)
MONOCYTES ABSOLUTE: 1 K/UL (ref 0–1.3)
MONOCYTES RELATIVE PERCENT: 19.3 %
NEUTROPHILS ABSOLUTE: 1.9 K/UL (ref 1.7–7.7)
NEUTROPHILS RELATIVE PERCENT: 37 %
PDW BLD-RTO: 15.9 % (ref 12.4–15.4)
PLATELET # BLD: 136 K/UL (ref 135–450)
PMV BLD AUTO: 8.2 FL (ref 5–10.5)
POTASSIUM REFLEX MAGNESIUM: 4.2 MMOL/L (ref 3.5–5.1)
RBC # BLD: 3.11 M/UL (ref 4.2–5.9)
SODIUM BLD-SCNC: 136 MMOL/L (ref 136–145)
WBC # BLD: 5.2 K/UL (ref 4–11)

## 2021-01-05 PROCEDURE — 6360000002 HC RX W HCPCS: Performed by: HOSPITALIST

## 2021-01-05 PROCEDURE — 6370000000 HC RX 637 (ALT 250 FOR IP): Performed by: HOSPITALIST

## 2021-01-05 PROCEDURE — 6370000000 HC RX 637 (ALT 250 FOR IP): Performed by: INTERNAL MEDICINE

## 2021-01-05 PROCEDURE — 2580000003 HC RX 258: Performed by: HOSPITALIST

## 2021-01-05 PROCEDURE — 2500000003 HC RX 250 WO HCPCS: Performed by: HOSPITALIST

## 2021-01-05 PROCEDURE — 80048 BASIC METABOLIC PNL TOTAL CA: CPT

## 2021-01-05 PROCEDURE — 85025 COMPLETE CBC W/AUTO DIFF WBC: CPT

## 2021-01-05 PROCEDURE — 36591 DRAW BLOOD OFF VENOUS DEVICE: CPT

## 2021-01-05 RX ORDER — LANOLIN ALCOHOL/MO/W.PET/CERES
400 CREAM (GRAM) TOPICAL 2 TIMES DAILY
Qty: 30 TABLET | Refills: 1 | Status: SHIPPED | OUTPATIENT
Start: 2021-01-05 | End: 2022-04-16 | Stop reason: SDUPTHER

## 2021-01-05 RX ORDER — POTASSIUM CHLORIDE 20 MEQ/1
40 TABLET, EXTENDED RELEASE ORAL 2 TIMES DAILY WITH MEALS
Qty: 60 TABLET | Refills: 0 | Status: SHIPPED | OUTPATIENT
Start: 2021-01-05

## 2021-01-05 RX ADMIN — FAMOTIDINE 20 MG: 10 INJECTION, SOLUTION INTRAVENOUS at 12:28

## 2021-01-05 RX ADMIN — Medication 10 ML: at 10:05

## 2021-01-05 RX ADMIN — POTASSIUM CHLORIDE 40 MEQ: 1500 TABLET, EXTENDED RELEASE ORAL at 10:03

## 2021-01-05 RX ADMIN — POTASSIUM CHLORIDE 40 MEQ: 1500 TABLET, EXTENDED RELEASE ORAL at 16:54

## 2021-01-05 RX ADMIN — THERA TABS 1 TABLET: TAB at 10:03

## 2021-01-05 RX ADMIN — METOPROLOL TARTRATE 25 MG: 25 TABLET, FILM COATED ORAL at 10:03

## 2021-01-05 RX ADMIN — CEFTRIAXONE 2 G: 2 INJECTION, POWDER, FOR SOLUTION INTRAMUSCULAR; INTRAVENOUS at 12:36

## 2021-01-05 RX ADMIN — LEVETIRACETAM 750 MG: 250 TABLET ORAL at 12:27

## 2021-01-05 RX ADMIN — ENOXAPARIN SODIUM 40 MG: 40 INJECTION SUBCUTANEOUS at 10:03

## 2021-01-05 RX ADMIN — Medication 400 MG: at 12:27

## 2021-01-05 RX ADMIN — Medication 100 MG: at 10:03

## 2021-01-05 NOTE — DISCHARGE SUMMARY
[facility] DISCHARGE SUMMARY    Patient Demographics    Patient. Levar Nevarez  Date of Birth. 1965  MRN. [id number]     Primary care provider. No primary care provider on file. (Tel: None)    Admit date: 12/29/2020    Discharge date (blank if same as Note Date): Note Date: 1/5/2021     Reason for Hospitalization. Chief Complaint   Patient presents with    Seizures     pt had three witnesses seizures per squad. pt with hx of seizures. pt post ictal upon squad arrival and upon pt arrival to ER. pt appears confused and restless. Significant Findings. Active Problems:    Partial epilepsy with impairment of consciousness, intractable (HCC)    Personal history of noncompliance with medical treatment    Seizure secondary to subtherapeutic anticonvulsant medication ([address]    Partial symptomatic epilepsy with complex partial seizures, intractable, without status epilepticus ([address]    Noncompliance with medication regimen    Seizures ([address]    Alcohol abuse    Breakthrough seizure ([address]    Acute encephalopathy    Streptococcal bacteremia    Streptococcus viridans infection    Alcohol cessation counseling    Tobacco abuse counseling  Resolved Problems:    * No resolved hospital problems. *       Problems and results from this hospitalization that need follow up. 1. None    Significant test results and incidental findings. XR CHEST PORTABLE   Final Result   Slightly increased reticular opacities suggest vascular congestion/pulmonary   edema. This may be superimposed upon chronic interstitial change. Small   right pleural effusion with asymmetric airspace change at the right base that   may represent atelectasis or edema. Underlying pneumonitis cannot be   excluded. CT HEAD WO CONTRAST   Final Result   No acute intracranial abnormality. Moderate white matter disease, likely chronic small vessel ischemia.          XR CHEST PORTABLE   Final Result Scarring/atelectatic changes seen overlying the lateral aspect of the right   mid to lower lung field and right lung base. Possible scarring and bullous   changes of the left lung apex is redemonstrated. No confluent airspace   opacity or pleural effusion is seen. Invasive procedures and treatments. 1. None     Problem-based Hospital Course. Patient is a 59 yo male with hx seizure, ETOH abuse, tobacco use. He presented to ER per EMS after having multiple seizures. He was confused and agitated in ER requiring restraints, and needing Precedex infusion. CT head with no acute abnormality. COVID-19 tested negative. Noted to have strep viridans bacteremia. ID service consulted.     Strep viridans bacteremia: Noted to have sepsis on admission with fever, tachycardia, tachypnea and lactic acidosis. ID service consulted. Started on IV ceftriaxone 2 g every 24 hours. Leukocytosis resolved. Last repeat blood cultures from 1/1/2021 are negative to date. As per ID recs, 2d-Echo ordered-and ruled out endocarditis. Patient discharged home on oral amoxicillin for 2 weeks as per ID recommendations.   Right lower lobe pneumonia: Likely gram-positive with Streptococcus viridans. On IV ceftriaxone for strep viridans bacteremia. ID service following.   Recurrent seizures: Possibly secondary to noncompliance, Keppra level < 2.0 on presentation. Continue oral Keppra 750 mg po bid. Appreciate neurology recs. EEG showed evidence of mild to moderate diffuse acute encephalopathy without evidence of epileptiform discharges.   Acute encephalopathy: resolved  Etiology likely metabolic secondary to sepsis and possible seizures. CT head with no acute abnormality. EEG with mild to moderate diffuse encephalopathy. Neurology on board, considered less likely meningoencephalitis.    Hypomagnesemia: Serum magnesium level of 1.3. Replacing IV as per protocol.   Started on oral mag oxide 400 mg p.o. twice daily.   Hypokalemia: Serum potassium level 3.4. Needing replacement as needed since admission. Started on KCl 40 mEq p.o. twice daily starting tonight.   Sinus tachycardia: Clinically euvolemic. on low dose Metoprolol.    H/o ETOH abuse. Blood ETOH negative, urine drug screen negative. Thiamine and MV tab po qd. Consults. IP CONSULT TO HOSPITALIST  IP CONSULT TO NEUROLOGY  IP CONSULT TO INFECTIOUS DISEASES  IP CONSULT TO SOCIAL WORK    Physical examination on discharge day. /71   Pulse 76   Temp 98.9 °F (37.2 °C) (Oral)   Resp 16   Ht 5' 11\" (1.803 m)   Wt 116 lb 2.9 oz (52.7 kg)   SpO2 97%   BMI 16.20 kg/m²   General appearance: Frail -American male, awake and oriented  Cardiovascular: Sinus rhythm on tele. Regular rhythm, normal S1, S2. No murmur. No edema in lower extremities  Respiratory: Not using accessory muscles. Diminished throughout. O2 sats high 90s room air   GI: Abdomen soft, not distended, normal bowel sounds  Musculoskeletal: no obvious bony deformities  Neurology: Cranial nerves grossly intact, no focal sensory or motor deficits  Skin: Warm, dry, normal turgor    Condition at time of discharge stable    Medication instructions provided to patient at discharge.      Medication List      START taking these medications    amoxicillin 500 MG capsule  Commonly known as: AMOXIL  Take 2 capsules by mouth 3 times daily for 14 days     Probiotic Acidophilus Tabs  Take 1 tablet by mouth 2 times daily for 15 days        ASK your doctor about these medications    folic acid 1 MG tablet  Commonly known as: FOLVITE  Take 1 tablet by mouth daily     levETIRAcetam 750 MG tablet  Commonly known as: Keppra  Take 1 tablet by mouth 2 times daily     multivitamin tablet  Take 1 tablet by mouth daily     risperiDONE 1 MG tablet  Commonly known as: RISPERDAL     thiamine 100 MG tablet  Take 1 tablet by mouth daily           Where to Get Your Medications      These medications were sent to [facility], [address]    Phone: [phone]   · amoxicillin 500 MG capsule  · Probiotic Acidophilus Tabs         Discharge recommendations given to patient. Follow Up. pcp in 1 week, BMP and magnesium in 1 week follow-up with PCP with results  Disposition. home  Activity. activity as tolerated  Diet: DIET DYSPHAGIA SOFT AND BITE-SIZED; Dietary Nutrition Supplements: Standard High Calorie Oral Supplement      Spent 45 minutes in discharge process.     Signed:  Samuel Akins MD     1/5/2021 10:30 AM

## 2021-01-05 NOTE — CARE COORDINATION
Received a voicemail from Dr. Earl Vizcaino and pt can discharge back to group home on oral anx now.  Sarita Pyle, MSW, LSW

## 2021-01-05 NOTE — PROGRESS NOTES
Pt discharged to group home from which he came from. Sister Elvie Crouch is aware of the discharge plans. First care here with wheelchair. Patient is ambulatory and opted to walk with the transport person. Telemetry removed from patient and walked over to 4T. PICC removed, pressure applied, and occlusive dressing applied over site as well, catheter was intact. Discharge instructions, Rx, and personal belongings given to pt, he had wallet glasses and clothing with him. Explanation of discharge medications and instructions understood by verbal statement. No questions, comments or concerns at this time.  Electronically signed by Kati Quinteors RN on 1/5/2021 at 6:38 PM

## 2021-01-05 NOTE — PROGRESS NOTES
Per provider, PICC line is to be removed from groin area.  Electronically signed by Maritza Mcclellan RN on 1/5/2021 at 4:58 PM

## 2021-01-14 NOTE — PROGRESS NOTES
[facility] PROGRESS NOTE    1/3/2021 12:37 PM        Name: Maxine Mckeon . Admitted: 12/29/2020  Primary Care Provider: No primary care provider on file. (Tel: None)      Chief complaint: Seizures, most likely secondary to noncompliance with antiepileptic medications    Brief History: Patient presented to ER per EMS after having multiple seizures. Apparently, he has been noncompliant with his Keppra. Level was undetectable on admission. Was loaded with Keppra intravenously. Patient also became confused and agitated in the emergency department. Admitted to ICU. Required restraints and started on Precedex drip. Precedex has been since discontinued. Patient is more cooperative. COVID-19 tested negative with rapid test and later with PCR  Found to have bacteremia. Started on ceftriaxone 2 g IV daily. No new seizures since admission  Able to eat properly today. Good appetite. Subjective:   Sleeping, arousable, no acute complaints. No changes compared to yesterday.     Current Medications    Scheduled Meds:   levETIRAcetam  750 mg Oral BID    risperiDONE  1 mg Oral Nightly    metoprolol tartrate  25 mg Oral BID    thiamine mononitrate  100 mg Oral Daily    influenza virus vaccine  0.5 mL Intramuscular Prior to discharge    sodium chloride flush  10 mL Intravenous 2 times per day    enoxaparin  40 mg Subcutaneous Daily    famotidine (PEPCID) injection  20 mg Intravenous BID    cefTRIAXone (ROCEPHIN) IV  2 g Intravenous Q24H    multivitamin  1 tablet Oral Daily     Continuous Infusions:  PRN Meds:.magnesium sulfate, potassium chloride, sodium chloride flush, promethazine **OR** ondansetron, polyethylene glycol, acetaminophen **OR** acetaminophen, potassium chloride **OR** potassium alternative oral replacement **OR** potassium chloride, LORazepam, benzonatate, LORazepam **OR** LORazepam **OR** Impression: Age-related nuclear cataract, bilateral: H25.13. Plan: Discussed diagnosis in detail with patient. Will continue to observe condition and or symptoms. Surgery not recommended at this time. Call if [address] worsens. Result   Slightly increased reticular opacities suggest vascular congestion/pulmonary   edema. This may be superimposed upon chronic interstitial change. Small   right pleural effusion with asymmetric airspace change at the right base that   may represent atelectasis or edema. Underlying pneumonitis cannot be   excluded. CT HEAD WO CONTRAST   Final Result   No acute intracranial abnormality. Moderate white matter disease, likely chronic small vessel ischemia. XR CHEST PORTABLE   Final Result   Scarring/atelectatic changes seen overlying the lateral aspect of the right   mid to lower lung field and right lung base. Possible scarring and bullous   changes of the left lung apex is redemonstrated. No confluent airspace   opacity or pleural effusion is seen. EEG 12/30/2020: Impression: This EEG is abnormal.  The generalized diffuse slowing is suggestive of mild to moderate diffuse encephalopathy. There is no evidence of epileptiform discharges, focal, or lateralizing abnormalities. Problem List  Active Problems:    Partial epilepsy with impairment of consciousness, intractable (HCC)    Personal history of noncompliance with medical treatment    Seizure secondary to subtherapeutic anticonvulsant medication ([address]    Partial symptomatic epilepsy with complex partial seizures, intractable, without status epilepticus ([address]    Noncompliance with medication regimen    Seizures ([address]    Alcohol abuse    Breakthrough seizure ([address]    Acute encephalopathy    Streptococcal bacteremia    Streptococcus viridans infection    Drug or alcohol risk assessment or counseling    Tobacco abuse counseling  Resolved Problems:    * No resolved hospital problems. *       Assessment & Plan:     Strep viridans bacteremia:   Unclear focus, probably pneumonia. However, bacteremia caused by endocarditis is also a possibility. Echo ordered, not yet performed.   Probably no sooner than tomorrow  Infectious disease consult requested. Input appreciated  On IV ceftriaxone 2 g daily  Repeat blood cultures pending  PICC line placement needed once blood cultures cleared. Probably not today    Sepsis present on arrival  Secondary to bacteremia and pneumonia. Fever, tachycardia, tachypnea and lactic acidosis on arrival.  Resolved at this time. Right lower lobe pneumonia  Presumed gram-positive with Streptococcus viridans  Continue treatment with IV ceftriaxone, dosed for bacteremia, which also would cover pneumonia. Infectious diseases following    Seizure disorder  Most likely secondary to noncompliance with undetectable Keppra level on arrival.  Evaluated by neurology  EEG performed  Switching Keppra from IV to p.o. today. Acute metabolic encephalopathy  Etiology multifactorial, with pneumonia, sepsis and seizures. CT head with no acute abnormality. EEG with mild to moderate diffuse encephalopathy. Neurology on board, considered less likely meningoencephalitis. Precedex is currently discontinued  Resolving with resolution of sepsis and better control of seizures. Sinus tachycardia:   Clinically euvolemic. Resolved with low-dose metoprolol. Alcohol abuse  No signs of active withdrawal.  Continue supportive management in addition to thiamine and multivitamins. Discussed with nursing      Diet: DIET DYSPHAGIA MINCED AND MOIST;  Code:Full Code  DVT PPX: Lovenox  Dispo: Unclear at this time, pending further work-up and echo results. Awaiting clearance of blood cultures so PICC can be placed on discharge planning can be made.       Uriah Villagran MD   1/3/2021 12:37 PM

## 2021-06-21 ENCOUNTER — APPOINTMENT (OUTPATIENT)
Dept: GENERAL RADIOLOGY | Age: 56
DRG: 682 | End: 2021-06-21
Payer: MEDICARE

## 2021-06-21 ENCOUNTER — APPOINTMENT (OUTPATIENT)
Dept: CT IMAGING | Age: 56
DRG: 682 | End: 2021-06-21
Payer: MEDICARE

## 2021-06-21 ENCOUNTER — HOSPITAL ENCOUNTER (INPATIENT)
Age: 56
LOS: 4 days | Discharge: HOME | DRG: 682 | End: 2021-06-25
Attending: EMERGENCY MEDICINE | Admitting: HOSPITALIST
Payer: MEDICARE

## 2021-06-21 DIAGNOSIS — A60.01 HERPES SIMPLEX INFECTION OF PENIS: ICD-10-CM

## 2021-06-21 DIAGNOSIS — N17.9 ACUTE KIDNEY INJURY (HCC): Primary | ICD-10-CM

## 2021-06-21 DIAGNOSIS — A41.9 SEPTICEMIA (HCC): ICD-10-CM

## 2021-06-21 LAB
A/G RATIO: 0.6 (ref 1.1–2.2)
ALBUMIN SERPL-MCNC: 3.2 G/DL (ref 3.4–5)
ALP BLD-CCNC: 113 U/L (ref 40–129)
ALT SERPL-CCNC: 30 U/L (ref 10–40)
AMPHETAMINE SCREEN, URINE: ABNORMAL
ANION GAP SERPL CALCULATED.3IONS-SCNC: 14 MMOL/L (ref 3–16)
AST SERPL-CCNC: 56 U/L (ref 15–37)
BARBITURATE SCREEN URINE: ABNORMAL
BASOPHILS ABSOLUTE: 0 K/UL (ref 0–0.2)
BASOPHILS RELATIVE PERCENT: 0.5 %
BENZODIAZEPINE SCREEN, URINE: ABNORMAL
BILIRUB SERPL-MCNC: 1.5 MG/DL (ref 0–1)
BILIRUBIN URINE: NEGATIVE
BLOOD, URINE: NEGATIVE
BUN BLDV-MCNC: 71 MG/DL (ref 7–20)
CALCIUM SERPL-MCNC: 9.1 MG/DL (ref 8.3–10.6)
CANNABINOID SCREEN URINE: POSITIVE
CHLORIDE BLD-SCNC: 92 MMOL/L (ref 99–110)
CLARITY: CLEAR
CO2: 32 MMOL/L (ref 21–32)
COCAINE METABOLITE SCREEN URINE: ABNORMAL
COLOR: YELLOW
CREAT SERPL-MCNC: 2.1 MG/DL (ref 0.9–1.3)
EOSINOPHILS ABSOLUTE: 0.1 K/UL (ref 0–0.6)
EOSINOPHILS RELATIVE PERCENT: 1.4 %
EPITHELIAL CELLS, UA: 0 /HPF (ref 0–5)
ETHANOL: NORMAL MG/DL (ref 0–0.08)
GFR AFRICAN AMERICAN: 39
GFR NON-AFRICAN AMERICAN: 33
GLOBULIN: 5.3 G/DL
GLUCOSE BLD-MCNC: 151 MG/DL (ref 70–99)
GLUCOSE URINE: NEGATIVE MG/DL
HCT VFR BLD CALC: 30.8 % (ref 40.5–52.5)
HEMOGLOBIN: 10.4 G/DL (ref 13.5–17.5)
HYALINE CASTS: 0 /LPF (ref 0–8)
KETONES, URINE: ABNORMAL MG/DL
LACTIC ACID, SEPSIS: 1.6 MMOL/L (ref 0.4–1.9)
LACTIC ACID, SEPSIS: 3.8 MMOL/L (ref 0.4–1.9)
LEUKOCYTE ESTERASE, URINE: NEGATIVE
LYMPHOCYTES ABSOLUTE: 2.6 K/UL (ref 1–5.1)
LYMPHOCYTES RELATIVE PERCENT: 25.9 %
Lab: ABNORMAL
MAGNESIUM: 1.6 MG/DL (ref 1.8–2.4)
MCH RBC QN AUTO: 30.1 PG (ref 26–34)
MCHC RBC AUTO-ENTMCNC: 33.7 G/DL (ref 31–36)
MCV RBC AUTO: 89.5 FL (ref 80–100)
METHADONE SCREEN, URINE: ABNORMAL
MICROSCOPIC EXAMINATION: ABNORMAL
MONOCYTES ABSOLUTE: 0.7 K/UL (ref 0–1.3)
MONOCYTES RELATIVE PERCENT: 6.6 %
NEUTROPHILS ABSOLUTE: 6.6 K/UL (ref 1.7–7.7)
NEUTROPHILS RELATIVE PERCENT: 65.6 %
NITRITE, URINE: NEGATIVE
OPIATE SCREEN URINE: ABNORMAL
OXYCODONE URINE: ABNORMAL
PDW BLD-RTO: 20.4 % (ref 12.4–15.4)
PH UA: 6
PH UA: 6 (ref 5–8)
PHENCYCLIDINE SCREEN URINE: ABNORMAL
PLATELET # BLD: 92 K/UL (ref 135–450)
PLATELET SLIDE REVIEW: ABNORMAL
PMV BLD AUTO: 8.2 FL (ref 5–10.5)
POTASSIUM REFLEX MAGNESIUM: 3.4 MMOL/L (ref 3.5–5.1)
PRO-BNP: 138 PG/ML (ref 0–124)
PROCALCITONIN: 0.59 NG/ML (ref 0–0.15)
PROPOXYPHENE SCREEN: ABNORMAL
PROTEIN UA: NEGATIVE MG/DL
RBC # BLD: 3.45 M/UL (ref 4.2–5.9)
RBC UA: 3 /HPF (ref 0–4)
SLIDE REVIEW: ABNORMAL
SODIUM BLD-SCNC: 138 MMOL/L (ref 136–145)
SPECIFIC GRAVITY UA: 1.02 (ref 1–1.03)
TOTAL PROTEIN: 8.5 G/DL (ref 6.4–8.2)
TROPONIN: <0.01 NG/ML
URIC ACID, SERUM: 13.6 MG/DL (ref 3.5–7.2)
URINE REFLEX TO CULTURE: ABNORMAL
URINE TYPE: ABNORMAL
UROBILINOGEN, URINE: 1 E.U./DL
WBC # BLD: 10 K/UL (ref 4–11)
WBC UA: 0 /HPF (ref 0–5)

## 2021-06-21 PROCEDURE — 71045 X-RAY EXAM CHEST 1 VIEW: CPT

## 2021-06-21 PROCEDURE — 84550 ASSAY OF BLOOD/URIC ACID: CPT

## 2021-06-21 PROCEDURE — 81001 URINALYSIS AUTO W/SCOPE: CPT

## 2021-06-21 PROCEDURE — 85025 COMPLETE CBC W/AUTO DIFF WBC: CPT

## 2021-06-21 PROCEDURE — 84484 ASSAY OF TROPONIN QUANT: CPT

## 2021-06-21 PROCEDURE — 87491 CHLMYD TRACH DNA AMP PROBE: CPT

## 2021-06-21 PROCEDURE — 87591 N.GONORRHOEAE DNA AMP PROB: CPT

## 2021-06-21 PROCEDURE — 99283 EMERGENCY DEPT VISIT LOW MDM: CPT

## 2021-06-21 PROCEDURE — 87040 BLOOD CULTURE FOR BACTERIA: CPT

## 2021-06-21 PROCEDURE — 96374 THER/PROPH/DIAG INJ IV PUSH: CPT

## 2021-06-21 PROCEDURE — 80307 DRUG TEST PRSMV CHEM ANLYZR: CPT

## 2021-06-21 PROCEDURE — 6370000000 HC RX 637 (ALT 250 FOR IP): Performed by: HOSPITALIST

## 2021-06-21 PROCEDURE — 80053 COMPREHEN METABOLIC PANEL: CPT

## 2021-06-21 PROCEDURE — 84133 ASSAY OF URINE POTASSIUM: CPT

## 2021-06-21 PROCEDURE — 96361 HYDRATE IV INFUSION ADD-ON: CPT

## 2021-06-21 PROCEDURE — 99223 1ST HOSP IP/OBS HIGH 75: CPT | Performed by: INTERNAL MEDICINE

## 2021-06-21 PROCEDURE — 83880 ASSAY OF NATRIURETIC PEPTIDE: CPT

## 2021-06-21 PROCEDURE — 84145 PROCALCITONIN (PCT): CPT

## 2021-06-21 PROCEDURE — 82570 ASSAY OF URINE CREATININE: CPT

## 2021-06-21 PROCEDURE — 82436 ASSAY OF URINE CHLORIDE: CPT

## 2021-06-21 PROCEDURE — 2580000003 HC RX 258: Performed by: INTERNAL MEDICINE

## 2021-06-21 PROCEDURE — 1200000000 HC SEMI PRIVATE

## 2021-06-21 PROCEDURE — 6370000000 HC RX 637 (ALT 250 FOR IP): Performed by: INTERNAL MEDICINE

## 2021-06-21 PROCEDURE — 93005 ELECTROCARDIOGRAM TRACING: CPT | Performed by: EMERGENCY MEDICINE

## 2021-06-21 PROCEDURE — 2580000003 HC RX 258: Performed by: EMERGENCY MEDICINE

## 2021-06-21 PROCEDURE — 6360000002 HC RX W HCPCS: Performed by: EMERGENCY MEDICINE

## 2021-06-21 PROCEDURE — 84300 ASSAY OF URINE SODIUM: CPT

## 2021-06-21 PROCEDURE — 6360000002 HC RX W HCPCS: Performed by: INTERNAL MEDICINE

## 2021-06-21 PROCEDURE — 6360000002 HC RX W HCPCS: Performed by: HOSPITALIST

## 2021-06-21 PROCEDURE — 71250 CT THORAX DX C-: CPT

## 2021-06-21 PROCEDURE — 83735 ASSAY OF MAGNESIUM: CPT

## 2021-06-21 PROCEDURE — 83605 ASSAY OF LACTIC ACID: CPT

## 2021-06-21 PROCEDURE — 82077 ASSAY SPEC XCP UR&BREATH IA: CPT

## 2021-06-21 RX ORDER — ONDANSETRON 2 MG/ML
4 INJECTION INTRAMUSCULAR; INTRAVENOUS EVERY 6 HOURS PRN
Status: DISCONTINUED | OUTPATIENT
Start: 2021-06-21 | End: 2021-06-25 | Stop reason: HOSPADM

## 2021-06-21 RX ORDER — SODIUM CHLORIDE, SODIUM LACTATE, POTASSIUM CHLORIDE, CALCIUM CHLORIDE 600; 310; 30; 20 MG/100ML; MG/100ML; MG/100ML; MG/100ML
INJECTION, SOLUTION INTRAVENOUS CONTINUOUS
Status: DISCONTINUED | OUTPATIENT
Start: 2021-06-21 | End: 2021-06-23

## 2021-06-21 RX ORDER — SODIUM CHLORIDE 0.9 % (FLUSH) 0.9 %
5-40 SYRINGE (ML) INJECTION EVERY 12 HOURS SCHEDULED
Status: DISCONTINUED | OUTPATIENT
Start: 2021-06-21 | End: 2021-06-25 | Stop reason: HOSPADM

## 2021-06-21 RX ORDER — RISPERIDONE 1 MG/1
1 TABLET, FILM COATED ORAL NIGHTLY
Status: DISCONTINUED | OUTPATIENT
Start: 2021-06-21 | End: 2021-06-25 | Stop reason: HOSPADM

## 2021-06-21 RX ORDER — ACETAMINOPHEN 325 MG/1
650 TABLET ORAL EVERY 6 HOURS PRN
Status: DISCONTINUED | OUTPATIENT
Start: 2021-06-21 | End: 2021-06-25 | Stop reason: HOSPADM

## 2021-06-21 RX ORDER — VANCOMYCIN HYDROCHLORIDE 1 G/200ML
1000 INJECTION, SOLUTION INTRAVENOUS ONCE
Status: DISCONTINUED | OUTPATIENT
Start: 2021-06-21 | End: 2021-06-21 | Stop reason: SDUPTHER

## 2021-06-21 RX ORDER — POLYETHYLENE GLYCOL 3350 17 G/17G
17 POWDER, FOR SOLUTION ORAL DAILY PRN
Status: DISCONTINUED | OUTPATIENT
Start: 2021-06-21 | End: 2021-06-25 | Stop reason: HOSPADM

## 2021-06-21 RX ORDER — SODIUM CHLORIDE 9 MG/ML
25 INJECTION, SOLUTION INTRAVENOUS PRN
Status: DISCONTINUED | OUTPATIENT
Start: 2021-06-21 | End: 2021-06-25 | Stop reason: HOSPADM

## 2021-06-21 RX ORDER — 0.9 % SODIUM CHLORIDE 0.9 %
1000 INTRAVENOUS SOLUTION INTRAVENOUS ONCE
Status: COMPLETED | OUTPATIENT
Start: 2021-06-21 | End: 2021-06-21

## 2021-06-21 RX ORDER — GAUZE BANDAGE 2" X 2"
100 BANDAGE TOPICAL DAILY
Status: DISCONTINUED | OUTPATIENT
Start: 2021-06-22 | End: 2021-06-25 | Stop reason: HOSPADM

## 2021-06-21 RX ORDER — IVERMECTIN 3 MG/1
200 TABLET ORAL ONCE
Status: COMPLETED | OUTPATIENT
Start: 2021-06-21 | End: 2021-06-21

## 2021-06-21 RX ORDER — FOLIC ACID 1 MG/1
1 TABLET ORAL DAILY
Status: DISCONTINUED | OUTPATIENT
Start: 2021-06-22 | End: 2021-06-25 | Stop reason: HOSPADM

## 2021-06-21 RX ORDER — ONDANSETRON 2 MG/ML
4 INJECTION INTRAMUSCULAR; INTRAVENOUS ONCE
Status: COMPLETED | OUTPATIENT
Start: 2021-06-21 | End: 2021-06-21

## 2021-06-21 RX ORDER — MAGNESIUM SULFATE 1 G/100ML
1000 INJECTION INTRAVENOUS ONCE
Status: COMPLETED | OUTPATIENT
Start: 2021-06-21 | End: 2021-06-21

## 2021-06-21 RX ORDER — DOXYCYCLINE HYCLATE 100 MG
100 TABLET ORAL EVERY 12 HOURS SCHEDULED
Status: DISCONTINUED | OUTPATIENT
Start: 2021-06-21 | End: 2021-06-22

## 2021-06-21 RX ORDER — SODIUM CHLORIDE 9 MG/ML
INJECTION, SOLUTION INTRAVENOUS CONTINUOUS
Status: DISCONTINUED | OUTPATIENT
Start: 2021-06-21 | End: 2021-06-22

## 2021-06-21 RX ORDER — PHENYTOIN SODIUM 100 MG/1
250 CAPSULE, EXTENDED RELEASE ORAL 2 TIMES DAILY
COMMUNITY

## 2021-06-21 RX ORDER — SODIUM CHLORIDE 0.9 % (FLUSH) 0.9 %
5-40 SYRINGE (ML) INJECTION PRN
Status: DISCONTINUED | OUTPATIENT
Start: 2021-06-21 | End: 2021-06-25 | Stop reason: HOSPADM

## 2021-06-21 RX ORDER — ACETAMINOPHEN 650 MG/1
650 SUPPOSITORY RECTAL EVERY 6 HOURS PRN
Status: DISCONTINUED | OUTPATIENT
Start: 2021-06-21 | End: 2021-06-25 | Stop reason: HOSPADM

## 2021-06-21 RX ORDER — ONDANSETRON 4 MG/1
4 TABLET, ORALLY DISINTEGRATING ORAL EVERY 8 HOURS PRN
Status: DISCONTINUED | OUTPATIENT
Start: 2021-06-21 | End: 2021-06-25 | Stop reason: HOSPADM

## 2021-06-21 RX ADMIN — LEVETIRACETAM 750 MG: 500 TABLET ORAL at 22:04

## 2021-06-21 RX ADMIN — DOXYCYCLINE HYCLATE 100 MG: 100 TABLET, COATED ORAL at 22:04

## 2021-06-21 RX ADMIN — CEFEPIME HYDROCHLORIDE 2000 MG: 2 INJECTION, POWDER, FOR SOLUTION INTRAVENOUS at 15:51

## 2021-06-21 RX ADMIN — ONDANSETRON 4 MG: 2 INJECTION INTRAMUSCULAR; INTRAVENOUS at 11:59

## 2021-06-21 RX ADMIN — SODIUM CHLORIDE 990 ML: 9 INJECTION, SOLUTION INTRAVENOUS at 11:59

## 2021-06-21 RX ADMIN — MAGNESIUM SULFATE HEPTAHYDRATE 1000 MG: 1 INJECTION, SOLUTION INTRAVENOUS at 19:42

## 2021-06-21 RX ADMIN — ENOXAPARIN SODIUM 40 MG: 40 INJECTION SUBCUTANEOUS at 19:43

## 2021-06-21 RX ADMIN — ACETAMINOPHEN 650 MG: 325 TABLET ORAL at 18:30

## 2021-06-21 RX ADMIN — IVERMECTIN 9 MG: 3 TABLET ORAL at 19:43

## 2021-06-21 RX ADMIN — SODIUM CHLORIDE 990 ML: 9 INJECTION, SOLUTION INTRAVENOUS at 15:47

## 2021-06-21 RX ADMIN — RISPERIDONE 1 MG: 1 TABLET ORAL at 22:04

## 2021-06-21 RX ADMIN — SODIUM CHLORIDE, POTASSIUM CHLORIDE, SODIUM LACTATE AND CALCIUM CHLORIDE: 600; 310; 30; 20 INJECTION, SOLUTION INTRAVENOUS at 18:37

## 2021-06-21 ASSESSMENT — PAIN SCALES - GENERAL
PAINLEVEL_OUTOF10: 3
PAINLEVEL_OUTOF10: 4

## 2021-06-21 ASSESSMENT — PAIN DESCRIPTION - LOCATION: LOCATION: BACK;NECK

## 2021-06-21 ASSESSMENT — ENCOUNTER SYMPTOMS
CONSTIPATION: 0
COUGH: 0
NAUSEA: 0
TROUBLE SWALLOWING: 0
ABDOMINAL PAIN: 0
RHINORRHEA: 0
BACK PAIN: 0
EYE REDNESS: 0
SHORTNESS OF BREATH: 0
EYE DISCHARGE: 0
DIARRHEA: 0
WHEEZING: 0
SORE THROAT: 0

## 2021-06-21 NOTE — PROGRESS NOTES
Pt admitted to room 5566 from ED. VSS on room air . O x 4. Pt lives home and was experiencing n/v. Admission Dx:CATALINO, drug abuse . Pt is independent. Pt oriented to room and updated on POC. Pt understands and has no further questions. Call light and bedside table within reach. Safety socks on and  bed in lowest position with 2/4 siderails up.

## 2021-06-21 NOTE — ED NOTES
Bed: 07  Expected date:   Expected time:   Means of arrival:   Comments:  MARLENY [facility], [address] [place] [address]  06/21/21 2618

## 2021-06-21 NOTE — H&P
HOSPITALISTS HISTORY AND PHYSICAL    6/21/2021 3:28 PM    Patient Information:  Paul Mahan is a 60 y.o. male [id number]  PCP:  No primary care provider on file. (Tel: None )    Chief complaint:    Chief Complaint   Patient presents with    Nausea     x2 days    Emesis     x2 days    Diarrhea     x2 days    Rash     on groin       History of Present Illness:  Clint Kramer is a 60 y.o. male who presented with with complaints of nausea vomiting and rash in the groin area onset of symptoms about 2 days ago that has progressively been getting worse. Patient with history of seizure disorder has been feeling sick for last several days. No fevers no chills. Just symptoms as noted above. No abdominal pain. Has been drinking some alcohol but last drink was about several days ago. Normally drinks about 1 can of beer every other day. No history of withdrawal.  Does complain of new rash on his penile area which he has had it for last 2 weeks. Maculopapular rash    REVIEW OF SYSTEMS:   Constitutional: Negative for fever,chills or night sweats  ENT: Negative for rhinorrhea, epistaxis, hoarseness, sore throat. Respiratory: Negative for shortness of breath,wheezing  Cardiovascular: Negative for chest pain, palpitations   Gastrointestinal: Negative for nausea, vomiting, diarrhea  Genitourinary: Negative for polyuria, dysuria   Hematologic/Lymphatic: Negative for bleeding tendency, easy bruising  Musculoskeletal: Negative for myalgias and arthralgias  Neurologic: Negative for confusion,dysarthria. Skin: Negative for itching,rash, good capillary refill. Psychiatric: Negative for depression,anxiety, agitation. Endocrine: Negative for polydipsia,polyuria,heat /cold intolerance. Past Medical History:   has a past medical history of Alcohol abuse, Cancer ([facility] and Seizures ([facility] Past Surgical History:   has no past surgical history on file. Medications:  No current facility-administered medications on file prior to encounter. Current Outpatient Medications on File Prior to Encounter   Medication Sig Dispense Refill    magnesium oxide (MAG-OX) 400 (240 Mg) MG tablet Take 1 tablet by mouth 2 times daily 30 tablet 1    potassium chloride (KLOR-CON M) 20 MEQ extended release tablet Take 2 tablets by mouth 2 times daily (with meals) 60 tablet 0    levETIRAcetam (KEPPRA) 750 MG tablet Take 1 tablet by mouth 2 times daily 60 tablet 3    risperiDONE (RISPERDAL) 1 MG tablet Take 1 mg by mouth nightly      folic acid (FOLVITE) 1 MG tablet Take 1 tablet by mouth daily 30 tablet 3    Multiple Vitamin (MULTIVITAMIN) tablet Take 1 tablet by mouth daily 30 tablet 3    vitamin B-1 100 MG tablet Take 1 tablet by mouth daily 30 tablet 3       Allergies: Allergies   Allergen Reactions    Carbamazepine Other (See Comments)     Thrombocytopenia    Codeine Itching     Per ER    Phenytoin Itching    Sulfa Antibiotics      Per ER        Social History:   reports that he has been smoking. He has a 40.00 pack-year smoking history. He has never used smokeless tobacco. He reports current alcohol use of about 1.0 standard drinks of alcohol per week. He reports current drug use. Drug: Marijuana. Family History: Mother hypertension    Physical Exam:  /82   Pulse 103   Temp 98.1 °F (36.7 °C)   Resp 20   Ht 5' 11\" (1.803 m)   Wt 103 lb (46.7 kg)   SpO2 99%   BMI 14.37 kg/m²     General appearance:  Appears comfortable. Well nourished  Eyes: Sclera clear, pupils equal  ENT: Moist mucus membranes, no thrush. Trachea midline. Cardiovascular: Regular rhythm, normal S1, S2. No murmur, gallop, rub.  No edema in lower extremities  Respiratory: Clear to auscultation bilaterally, no wheeze, good inspiratory effort  Gastrointestinal: Abdomen soft, non-tender, not distended, normal bowel sounds  Musculoskeletal: No cyanosis in digits, neck supple  Neurology: Cranial nerves grossly intact. Alert and oriented in time, place and person. No speech or motor deficits  Psychiatry: Appropriate affect. Not agitated  Skin: Warm, dry, normal turgor, no rash    Labs:  CBC:   Lab Results   Component Value Date    WBC 10.0 06/21/2021    RBC 3.45 06/21/2021    HGB 10.4 06/21/2021    HCT 30.8 06/21/2021    MCV 89.5 06/21/2021    MCH 30.1 06/21/2021    MCHC 33.7 06/21/2021    RDW 20.4 06/21/2021    PLT 92 06/21/2021    MPV 8.2 06/21/2021     BMP:    Lab Results   Component Value Date     06/21/2021    K 3.4 06/21/2021    CL 92 06/21/2021    CO2 32 06/21/2021    BUN 71 06/21/2021    CREATININE 2.1 06/21/2021    CALCIUM 9.1 06/21/2021    GFRAA 39 06/21/2021    LABGLOM 33 06/21/2021    GLUCOSE 151 06/21/2021       Chest Xray:   EKG:    I visualized CXR images and EKG strips        Problem List  Active Problems:    CATALINO (acute kidney injury) ([facility] [address]  Resolved Problems:    * No resolved hospital problems. *        Assessment/Plan:   Acute kidney injury  - likely dehydration.  -Presumed prerenal.  -Start with IV fluid. Monitor    Generalized fatigue diarrhea and nausea  -With unclear etiology.  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  -Hold off on any other antibiotics.   -Monitor closely            Katerin Martin MD    6/21/2021 3:28 PM

## 2021-06-21 NOTE — CONSULTS
appropriate. - Dose meds for eGFR <15 mL/min/1.73m2 during CATALINO    - Avoid heavy opioids due to renal failure - may use very low dose dilaudid / fentanyl with close monitoring of CNS and respiratory depression. Please refer to the orders. High Complexity. Multiple complex problems. Discussed with patient and treatment team-    Time spent > 30 (~35) minutes. Thank you for allowing me to participate in this patient's care. Please do not hesitate to contact me anytime. We will follow along with you. Tomy Stringer MD,  [facility] [address] [place]: [phone] or [address]  Fax: [phone]        ========================================================   ========================================================       CHIEF COMPLAINT:   Chief Complaint   Patient presents with    Nausea     x2 days    Emesis     x2 days    Diarrhea     x2 days    Rash     on groin     History Obtained From:  patient + treatment team + Electronic Medical Records    HPI: Mr. Rosi Benitez is a 60 y.o. male with significant past medical history as below:   Past Medical History:   Diagnosis Date    Alcohol abuse     [facility]     [facility]izures ([facility]       Presents with Nausea (x2 days), Emesis (x2 days), Diarrhea (x2 days), and Rash (on groin)    Admitted with CATALINO (acute kidney injury) ([facility] [N17.9]   Found to have CATALINO , so we are called for that. No current active complaints. Patient denied chest pain / dizziness/lightheadedness/syncope/ SOB / leg edema. Regarding: CATALINO on ? CKD  · Duration: acute on early chronic  · Location: kidneys  · Severity: Severe    · Timing: continous  · Context (ex: related to condition):   , refer to my assessment / impression. · Modifying factors (ex: medications, interventions):   , refer to my plan / recommendation.   · Associated signs & symptoms (ex: edema, SOB): As mentioned above in CC and HPI      Past medical, Surgical, Social, Family medical history Abused:     Physically Abused:     Sexually Abused:           MEDICATIONS: reviewed by me. Medications Prior to Admission:  No current facility-administered medications on file prior to encounter.      Current Outpatient Medications on File Prior to Encounter   Medication Sig Dispense Refill    magnesium oxide (MAG-OX) 400 (240 Mg) MG tablet Take 1 tablet by mouth 2 times daily 30 tablet 1    potassium chloride (KLOR-CON M) 20 MEQ extended release tablet Take 2 tablets by mouth 2 times daily (with meals) 60 tablet 0    levETIRAcetam (KEPPRA) 750 MG tablet Take 1 tablet by mouth 2 times daily 60 tablet 3    risperiDONE (RISPERDAL) 1 MG tablet Take 1 mg by mouth nightly      folic acid (FOLVITE) 1 MG tablet Take 1 tablet by mouth daily 30 tablet 3    Multiple Vitamin (MULTIVITAMIN) tablet Take 1 tablet by mouth daily 30 tablet 3    vitamin B-1 100 MG tablet Take 1 tablet by mouth daily 30 tablet 3         Current Facility-Administered Medications:     0.9 % sodium chloride bolus, 1,000 mL, Intravenous, Once, Miryam Miller MD, Last Rate: 999 mL/hr at 06/21/21 1547, 990 mL at 06/21/21 1547    cefepime (MAXIPIME) 2000 mg IVPB minibag, 2,000 mg, Intravenous, Q12H, Miryam Miller MD, Last Rate: 100 mL/hr at 06/21/21 1551, 2,000 mg at 06/21/21 1551    vancomycin (VANCOCIN) 750 mg in dextrose 5 % 250 mL IVPB, 15 mg/kg, Intravenous, Once, Miryam Miller MD    magnesium sulfate 1000 mg in dextrose 5% 100 mL IVPB, 1,000 mg, Intravenous, Once, Atilio Radford MD    lactated ringers infusion, , Intravenous, Continuous, Atilio Radford MD    Current Outpatient Medications:     magnesium oxide (MAG-OX) 400 (240 Mg) MG tablet, Take 1 tablet by mouth 2 times daily, Disp: 30 tablet, Rfl: 1    potassium chloride (KLOR-CON M) 20 MEQ extended release tablet, Take 2 tablets by mouth 2 times daily (with meals), Disp: 60 tablet, Rfl: 0    levETIRAcetam (KEPPRA) 750 MG tablet, Take 1 tablet by mouth 2 times daily, Disp: 60 tablet, Rfl: 3    risperiDONE (RISPERDAL) 1 MG tablet, Take 1 mg by mouth nightly, Disp: , Rfl:     folic acid (FOLVITE) 1 MG tablet, Take 1 tablet by mouth daily, Disp: 30 tablet, Rfl: 3    Multiple Vitamin (MULTIVITAMIN) tablet, Take 1 tablet by mouth daily, Disp: 30 tablet, Rfl: 3    vitamin B-1 100 MG tablet, Take 1 tablet by mouth daily, Disp: 30 tablet, Rfl: 3      Allergies reviewed by me: Carbamazepine, Codeine, Phenytoin, and Sulfa antibiotics    REVIEW OF SYSTEMS:  As mentioned in chief complaint and HPI , Subjective   All other 10-point review of systems: negative. PHYSICAL EXAM:  Recent vital signs and recent I/Os reviewed by me. Wt Readings from Last 3 Encounters:   06/21/21 103 lb (46.7 kg)   01/04/21 116 lb 2.9 oz (52.7 kg)   12/27/19 150 lb (68 kg)     BP Readings from Last 3 Encounters:   06/21/21 106/65   01/05/21 109/71   12/27/19 (!) 132/48     Patient Vitals for the past 24 hrs:   BP Temp Pulse Resp SpO2 Height Weight   06/21/21 1400 106/65 -- 99 18 100 % -- --   06/21/21 1345 124/70 -- 88 15 100 % -- --   06/21/21 1330 95/62 -- 99 20 99 % -- --   06/21/21 1315 103/66 -- 95 17 100 % -- --   06/21/21 1300 121/63 -- 96 17 98 % -- --   06/21/21 1245 128/72 -- 93 18 95 % -- --   06/21/21 1230 121/67 -- 96 18 95 % -- --   06/21/21 1215 125/67 -- 95 18 96 % -- --   06/21/21 1200 114/82 -- 103 20 99 % -- --   06/21/21 1146 118/74 -- 115 27 100 % -- --   06/21/21 1136 (!) 146/80 98.1 °F (36.7 °C) 126 16 100 % 5' 11\" (1.803 m) 103 lb (46.7 kg)   06/21/21 1130 (!) 146/80 -- -- -- 98 % -- --     No intake or output data in the 24 hours ending 06/21/21 1553      Physical Exam  Vitals reviewed. Constitutional:       General: He is not in acute distress. Appearance: He is underweight. He is ill-appearing. HENT:      Head: Normocephalic and atraumatic.       Right Ear: External ear normal.      Left Ear: External ear normal.      Nose: Nose normal.      Mouth/Throat: Mouth: Mucous membranes are not dry. Eyes:      General: No scleral icterus. Conjunctiva/sclera: Conjunctivae normal.   Neck:      Vascular: No JVD. Cardiovascular:      Rate and Rhythm: Normal rate and regular rhythm. Heart sounds: S1 normal and S2 normal.   Pulmonary:      Effort: Pulmonary effort is normal. No respiratory distress. Breath sounds: Normal breath sounds. Abdominal:      General: Bowel sounds are normal.      Palpations: Abdomen is soft. Musculoskeletal:         General: No swelling or deformity. Cervical back: Normal range of motion and neck supple. Skin:     General: Skin is warm and dry. Neurological:      Mental Status: He is alert and oriented to person, place, and time. Mental status is at baseline. Psychiatric:         Mood and Affect: Mood normal.         Behavior: Behavior normal.         DATA:  Diagnostic tests reviewed by me for today's visit:   (AS NEEDED FOR MY EVALUATION AND MANAGEMENT). Recent Labs     06/21/21  1152   WBC 10.0   HCT 30.8*   PLT 92*     Iron Saturation:  No components found for: PERCENTFE  FERRITIN:  No results found for: FERRITIN  IRON:  No results found for: IRON  TIBC:  No results found for: TIBC    Recent Labs     06/21/21  1152      K 3.4*   CL 92*   CO2 32   BUN 71*   CREATININE 2.1*     Recent Labs     06/21/21  1152   CALCIUM 9.1   MG 1.60*     No results for input(s): PH, PCO2, PO2 in the last 72 hours.     Invalid input(s): Xiao Lux    ABG:  No results found for: PH, PCO2, PO2, HCO3, BE, THGB, TCO2, O2SAT  VBG:  No results found for: PHVEN, [id number], BEVEN, P7MQPJWA    LDH:  No results found for: LDH  Uric Acid:    Lab Results   Component Value Date    LABURIC 13.6 06/21/2021       PT/INR:    Lab Results   Component Value Date    PROTIME 12.0 05/16/2018    INR 1.06 05/16/2018     Warfarin PT/INR:  No components found for: PTPATWAR, PTINRWAR  PTT:  No results found for: APTT, PTT[APTT}  Last 3 Troponin:    Lab Results   Component Value Date    TROPONINI <0.01 06/21/2021    TROPONINI <0.01 12/29/2020    TROPONINI <0.01 05/15/2018       U/A:    Lab Results   Component Value Date    COLORU YELLOW 12/29/2020    PROTEINU Negative 12/29/2020    PHUR 7.0 12/29/2020    PHUR 7.0 12/29/2020    WBCUA 0 06/25/2015    RBCUA 1 06/25/2015    CLARITYU Clear 12/29/2020    SPECGRAV 1.011 12/29/2020    LEUKOCYTESUR Negative 12/29/2020    UROBILINOGEN 0.2 12/29/2020    BILIRUBINUR Negative 12/29/2020    BLOODU Negative 12/29/2020    GLUCOSEU Negative 12/29/2020     Microalbumen/Creatinine ratio:  No components found for: RUCREAT  24 Hour Urine for Protein:  No components found for: RAWUPRO, UHRS3, FMKT66WY, UTV3  24 Hour Urine for Creatinine Clearance:  No components found for: CREAT4, UHRS10, UTV10  Urine Toxicology:  No components found for: Hampton Shaker, IBENZO, ICOCAINE, IMARTHC, IOPIATES, IPHENCYC    HgBA1c:    Lab Results   Component Value Date    LABA1C 5.8 06/25/2015     RPR:  No results found for: RPR  HIV:  No results found for: HIV  CLARK:  No results found for: ANATITER, CLARK  RF:  No results found for: RF  DSDNA:  No components found for: DNA  AMYLASE:  No results found for: AMYLASE  LIPASE:    Lab Results   Component Value Date    LIPASE 52.0 12/29/2020     Fibrinogen Level:  No components found for: FIB       BELOW MENTIONED RADIOLOGY STUDY RESULTS BY ME (AS NEEDED FOR MY EVALUATION AND MANAGEMENT). CT CHEST WO CONTRAST    Result Date: 6/21/2021  EXAMINATION: CT OF THE CHEST WITHOUT CONTRAST 6/21/2021 2:13 pm TECHNIQUE: CT of the chest was performed without the administration of intravenous contrast. Multiplanar reformatted images are provided for review. Dose modulation, iterative reconstruction, and/or weight based adjustment of the mA/kV was utilized to reduce the radiation dose to as low as reasonably achievable.  COMPARISON: Chest radiograph on the same date HISTORY: ORDERING SYSTEM PROVIDED HISTORY: pneumonia TECHNOLOGIST PROVIDED HISTORY: Reason for exam:->pneumonia Decision Support Exception - unselect if not a suspected or confirmed emergency medical condition->Emergency Medical Condition (MA) Reason for Exam: pneumonia Acuity: Unknown Type of Exam: Unknown FINDINGS: Mediastinum: Heart size is normal. Aorta and pulmonary arteries are normal. Superior mediastinum is normal. No lymph node enlargement within the chest. The esophagus tapers normally. Lungs/pleura: The airways are patent. Bronchiectasis in the periphery of the lungs bilaterally. Moderate centrilobular emphysema with areas of honeycombing with a lower and peripheral disposition. No focal consolidation. No suspicious mass or nodule. Upper Abdomen: Adrenal glands are normal.  No significant findings in the visualized upper abdomen. Soft Tissues/Bones: No skeletal abnormalities are appreciated within the chest.     1. No acute airspace abnormality. No evidence of underlying pneumonia. 2. Moderate centrilobular emphysema and chronic interstitial lung disease with overall pattern favoring UIP. XR CHEST PORTABLE    Result Date: 6/21/2021  EXAMINATION: ONE XRAY VIEW OF THE CHEST 6/21/2021 12:35 pm COMPARISON: Chest x-ray dated 12/31/2020 HISTORY: ORDERING SYSTEM PROVIDED HISTORY: nausea TECHNOLOGIST PROVIDED HISTORY: Reason for exam:->nausea Reason for Exam: Nausea (x2 days) Acuity: Acute Type of Exam: Initial FINDINGS: HEART/MEDIASTINUM: The cardiomediastinal silhouette is within normal limits. PLEURA/LUNGS: Subtle patchy hazy opacities noted in the bilateral upper lungs which may reflect airspace disease in the correct clinical setting. Possible spiculated opacity noted in the left upper lung. There are no pleural effusions. There is no appreciable pneumothorax. BONES/SOFT TISSUE: No acute abnormality. Subtle patchy hazy opacities noted in the bilateral upper lungs which may reflect airspace disease in the correct clinical setting. Possible spiculated nodule noted in the left upper lung. Further evaluation with nonemergent chest CT is recommended.

## 2021-06-21 NOTE — ED NOTES
Provided resources for inpatient treatment at [address] [facility] and [place] [address] along with [facility]. Provided outpatient care at [address] Patient is living in a Group home and is visiting with family.        Cam Garcia  06/21/21 [id number]

## 2021-06-21 NOTE — CONSULTS
Infectious Diseases   Consult Note        Admission Date: 6/21/2021  Hospital Day: Hospital Day: 1   Attending: Sweta Becker MD  Date of service: 6/21/21     Reason for admission: CATALINO (acute kidney injury) [facility]) [N17.9]    Chief complaint/ Reason for consult: *Concern for penile  lesions    Microbiology:        I have reviewed allavailable micro lab data and cultures        Antibiotics and immunizations:       Current antibiotics: All antibiotics and their doses were reviewed by me    Recent Abx Admin                   cefepime (MAXIPIME) 2000 mg IVPB minibag (mg) 2,000 mg New Bag 06/21/21 1551                  Immunization History: All immunization history was reviewed by me today. There is no immunization history for the selected administration types on file for this patient. Known drug allergies: All allergies were reviewed and updated    Allergies   Allergen Reactions    Carbamazepine Other (See Comments)     Thrombocytopenia    Codeine Itching     Per ER    Phenytoin Itching    Sulfa Antibiotics      Per ER       Social history:     Social History:  All social andepidemiologic history was reviewed and updated by me today as needed. · Tobacco use:   reports that he has been smoking. He has a 40.00 pack-year smoking history. He has never used smokeless tobacco.  · Alcohol use:   reports current alcohol use of about 1.0 standard drinks of alcohol per week. · Currently lives in: [place] [address]  ·  reports current drug use. Drug: Marijuana. COVID VACCINATION AND LAB RESULT RECORDS:     Internal Administration   First Dose      Second Dose           Last COVID Lab SARS-CoV-2, NAAT (no units)   Date Value   12/30/2020 Not Detected            Assessment:     The patient is a 60 y.o. old male who  has a past medical history of Alcohol abuse, Cancer ([facility] and Seizures ([facility]  with following problems:    · Penile rash - *scabies versus bacterial infection  · Hypokalemia  · Hypomagnesemia  · Acute kidney injury with elevated serum creatinine of 2.1  · Transaminitis with elevated AST of 56   · Elevated total bilirubin of 1.5  · History of streptococcal bacteremia in January 2021  · Seizure disorder  · History of marijuana abuse  · Chronic four-vessel ischemia  · Smoker  · Alcohol abuse  · Emaciation: Body mass index is 14.37 kg/m². ·     Discussion:      The patient was admitted with nausea and vomiting and rash in the groin area. He has history of marijuana abuse and chronic alcohol use. The patient is known to me from previous hospitalization in January of this year for streptococcal bacteremia. Primary team was concerned about a rash on the penis area. Patient has been a rash has been going on for 1 week and has been quite itchy, especially at night. Cannot rule out scabies    He had a CT scan of the chest without contrast done earlier today. Images reviewed. It was negative for any acute infection. Emphysematous changes noted. Chronic interstitial lung disease cannot be ruled out. Plan:     Diagnostic Workup:    · Agree with blood cultures  · Will order urine tox screen  · We will order baseline HIV screen  · Order baseline urine gonorrhea and chlamydia NAAT and syphilis screen for thoroughness  · We will order acute viral hepatitis panel. · Continue to follow fever curve, WBC count and blood cultures  · Follow up on liverand renal functions closely    Antimicrobials:    · Will stop empiric vancomycin and cefepime  · Will order ivermectin 200 mcg/kg Orally as a single dose  · Start patient on oral doxycycline 100 mg every 12 hours  · Contact isolation  · We will follow up on the culture results and clinical progress and will make further recommendations accordingly. · Continue close vitals monitoring. · Maintain good glycemic control. · Fall precautions. Aspiration precautions. · Continue to watch for new fever or diarrhea.   · DVT prophylaxis. · Discussed all above with patient and RN. Drug Monitoring:    · Continue serial monitoring for antibiotic toxicity as follows: *CBC, CMP  · Continue to watch for following: new or worsening fever, hypotension, hives, lip swelling and redness or purulence at vascular access sites. I/v access Management:    · Continue to monitor i.v access sites for erythema, induration, discharge or tenderness. · As always, continue efforts to minimizetubes/lines/drains as clinically appropriate to reduce chances of line associated infections. Current isolation precautions: There are no current isolations documented for this patient. Illicit drug use and tobacco use disorder counselin. I have counseled the patient extensively about risks of using illicit drugs and have encouraged the patient to maintain a healthy drug-free lifestyle. Information was given about various substance use prevention programs available in the area and their websites including www. addicted. org, www.madd. org, www.catsober. org, www.[employer]. EventBrowsr.com and www. addictionservicescouncil.org.  2. I have counseled the patient extensively about risks of smoking and have encouraged the patient to maintain a healthy smoke-free lifestyle. Information was given about various smoking cessation programs and their websites like www.smokefree.gov, [place]. quitlogix. org as well as help lines like 800-QUIT-NOW and 800Dubset MediaLUNGDubset MediaUSA. Level of complexity of consult: High     Risk of Complications/Morbidity: High     · Illness(es)/ Infection present that pose threat to life/bodily function. · There is potential for severe exacerbation of infection/side effects of treatment. · Therapy requires intensive monitoring for antimicrobial agent toxicity. Thank you for involving me in the care of your patient. I will continue to follow. If you have any additional questions, please do not hesitate to contact me.     Subjective: Presenting complaint in ER:     Chief Complaint   Patient presents with    Nausea     x2 days    Emesis     x2 days    Diarrhea     x2 days    Rash     on groin        HPI: Cesar Laurent is a 60 y.o. male patient, who was seen at the request of Dr. Joss Shaikh MD.    History was obtained from chart review and the patient. The patient was admitted on 6/21/2021. I have been consulted to see the patient for above mentioned reason(s). The patient has multiple medical comorbidities, and presented to the ER for generalized weakness and nausea and vomiting. The patient drinks a can of beer every other day. In the ER, he was afebrile. White cell count was 10,000. The patient was noted to have some lesions on his penile area. He was admitted. I have been asked for my opinion for management for this patient. Past Medical History: All past medical history reviewed today. Past Medical History:   Diagnosis Date    Alcohol abuse     Cancer ([facility]     Seizures ([facility]          Past Surgical History: All pastsurgical history was reviewed today. History reviewed. No pertinent surgical history. Family History: All family history was reviewed today. History reviewed. No pertinent family history. Medications: All current and past medications were reviewed. Not in a hospital admission.  cefepime  2,000 mg Intravenous Q12H    vancomycin  15 mg/kg Intravenous Once    magnesium sulfate  1,000 mg Intravenous Once          REVIEW OF SYSTEMS:       Review of Systems   Constitutional: Negative for chills, diaphoresis and fever. HENT: Negative for ear discharge, ear pain, rhinorrhea, sore throat and trouble swallowing. Eyes: Negative for discharge and redness. Respiratory: Negative for cough, shortness of breath and wheezing. Cardiovascular: Negative for chest pain and leg swelling.    Gastrointestinal: Negative for abdominal pain, constipation, diarrhea and nausea. Endocrine: Negative for polyuria. Genitourinary: Negative for dysuria, flank pain, frequency, hematuria and urgency. Musculoskeletal: Negative for back pain and myalgias. Skin: Negative for rash. Itchy rash on the penis area   Neurological: Negative for dizziness, seizures and headaches. Hematological: Does not bruise/bleed easily. Psychiatric/Behavioral: Negative for hallucinations and suicidal ideas. All other systems reviewed and are negative. Objective:       PHYSICAL EXAM:      Vitals:   Vitals:    06/21/21 1400 06/21/21 1600 06/21/21 1615 06/21/21 1630   BP: 106/65 127/71 130/79 117/68   Pulse: 99 96 99 102   Resp: 18 21 20 23   Temp:       SpO2: 100% 98% 100% 95%   Weight:       Height:           Physical Exam  Vitals and nursing note reviewed. Constitutional:       Appearance: Normal appearance. He is well-developed. HENT:      Head: Normocephalic and atraumatic. Right Ear: External ear normal.      Left Ear: External ear normal.      Nose: Nose normal. No congestion or rhinorrhea. Mouth/Throat:      Mouth: Mucous membranes are moist.      Pharynx: No oropharyngeal exudate or posterior oropharyngeal erythema. Eyes:      General: No scleral icterus. Right eye: No discharge. Left eye: No discharge. Conjunctiva/sclera: Conjunctivae normal.      Pupils: Pupils are equal, round, and reactive to light. Cardiovascular:      Rate and Rhythm: Normal rate and regular rhythm. Pulses: Normal pulses. Heart sounds: No murmur heard. No friction rub. Pulmonary:      Effort: Pulmonary effort is normal. No respiratory distress. Breath sounds: Normal breath sounds. No stridor. No wheezing, rhonchi or rales. Abdominal:      General: Bowel sounds are normal.      Palpations: Abdomen is soft. Tenderness: There is no abdominal tenderness. There is no right CVA tenderness, left CVA tenderness, guarding or rebound. Musculoskeletal:         General: No swelling or tenderness. Normal range of motion. Cervical back: Normal range of motion and neck supple. No rigidity. No muscular tenderness. Lymphadenopathy:      Cervical: No cervical adenopathy. Skin:     General: Skin is warm and dry. Coloration: Skin is not jaundiced. Findings: Rash present. No erythema. Comments: Papular rash noted on the penis area. See pictures below   Neurological:      General: No focal deficit present. Mental Status: He is alert and oriented to person, place, and time. Mental status is at baseline. Motor: No abnormal muscle tone. Psychiatric:         Mood and Affect: Mood normal.         Behavior: Behavior normal.         Thought Content: Thought content normal.                   Lines: All vascular access sites are healthy with no local erythema, discharge or tenderness. Intake and output:     No intake/output data recorded. Lab Data:   All available labs were reviewed by me today. CBC:   Recent Labs     06/21/21  1152   WBC 10.0   RBC 3.45*   HGB 10.4*   HCT 30.8*   PLT 92*   MCV 89.5   MCH 30.1   MCHC 33.7   RDW 20.4*        BMP:  Recent Labs     06/21/21  1152      K 3.4*   CL 92*   CO2 32   BUN 71*   CREATININE 2.1*   CALCIUM 9.1   GLUCOSE 151*        Hepatic FunctionPanel:   Lab Results   Component Value Date    ALKPHOS 113 06/21/2021    ALT 30 06/21/2021    AST 56 06/21/2021    PROT 8.5 06/21/2021    BILITOT 1.5 06/21/2021    BILIDIR 0.4 01/01/2021    IBILI 0.4 01/01/2021    LABALBU 3.2 06/21/2021       CPK:   Lab Results   Component Value Date    CKTOTAL 118 12/29/2020     ESR: No results found for: SEDRATE  CRP: No results found for: CRP      Imaging: All pertinent images and reports for the current visit were reviewed by meduring this visit. CT CHEST WO CONTRAST   Final Result   1. No acute airspace abnormality. No evidence of underlying pneumonia.    2. Moderate centrilobular emphysema and chronic interstitial lung disease   with overall pattern favoring UIP. XR CHEST PORTABLE   Final Result   Subtle patchy hazy opacities noted in the bilateral upper lungs which may   reflect airspace disease in the correct clinical setting. Possible spiculated nodule noted in the left upper lung. Further evaluation   with nonemergent chest CT is recommended. Outside records:    Labs, Microbiology, Radiology and pertinent results from Care everywhere, if available, were reviewed as a part ofthe consultation. Problem list:       Patient Active Problem List   Diagnosis Code    Partial symptomatic epilepsy with complex partial seizures, intractable, without status epilepticus ([address] G40.219    Noncompliance with medication regimen Z91.14    Seizures ([address] R56.9    Alcohol abuse F10.10    Pneumonia J18.9    Nausea and vomiting R11.2    Breakthrough seizure ([address] G40.919    Acute febrile illness R50.9    Sepsis ([address] A41.9    Lactic acid acidosis E87.2    Smoker F17.200    Cerebrovascular small vessel disease I67.9    Streptococcal bacteremia R78.81, B95.5    Streptococcus viridans infection A49.1    Drug abuse counseling and surveillance of drug abuser Z71.51    Tobacco abuse counseling Z71.6    Acute kidney injury ([address] N17.9    Herpes simplex infection of penis A60.01    Hypokalemia E87.6    Hypomagnesemia E83.42    Elevated bilirubin R17    Transaminitis R74.01    Marijuana abuse F12.10    Subcutaneous emphysema resulting from a procedure T81.82XA    Emaciated ([address] E43         Please note that this chart was generated using Dragon dictation software. Although every effort was made to ensure the accuracy of this automated transcription, some errors in transcription may have occurred inadvertently. If you may need any clarification, please do not hesitate to contact me through EPIC or at the phone number provided below with my electronic signature. Any pictures or media included in this note were obtained after taking informed verbal consent from the patient and with their approval to include those in the patient's medical record.       Marlene Escalera MD, MPH  6/21/21, 4:47 PM EDT   [facility]   [address] [place] [address], [address]  Office: [phone]  Fax: [phone]  Clinic days:  Tuesday & Thursday

## 2021-06-21 NOTE — ED PROVIDER NOTES
History:   Diagnosis Date    Alcohol abuse     [facility]     [facility]          SURGICALHISTORY     History reviewed. No pertinent surgical history. CURRENT MEDICATIONS       Previous Medications    FOLIC ACID (FOLVITE) 1 MG TABLET    Take 1 tablet by mouth daily    LEVETIRACETAM (KEPPRA) 750 MG TABLET    Take 1 tablet by mouth 2 times daily    MAGNESIUM OXIDE (MAG-OX) 400 (240 MG) MG TABLET    Take 1 tablet by mouth 2 times daily    MULTIPLE VITAMIN (MULTIVITAMIN) TABLET    Take 1 tablet by mouth daily    POTASSIUM CHLORIDE (KLOR-CON M) 20 MEQ EXTENDED RELEASE TABLET    Take 2 tablets by mouth 2 times daily (with meals)    RISPERIDONE (RISPERDAL) 1 MG TABLET    Take 1 mg by mouth nightly    VITAMIN B-1 100 MG TABLET    Take 1 tablet by mouth daily       ALLERGIES     Carbamazepine, Codeine, Phenytoin, and Sulfa antibiotics    FAMILY HISTORY     History reviewed. No pertinent family history. SOCIAL HISTORY       Social History     Socioeconomic History    Marital status: Single     Spouse name: None    Number of children: 0    Years of education: None    Highest education level: None   Occupational History    None   Tobacco Use    Smoking status: Current Every Day Smoker     Packs/day: 1.00     Years: 40.00     Pack years: 40.00    Smokeless tobacco: Never Used   Vaping Use    Vaping Use: Never used   Substance and Sexual Activity    Alcohol use:  Yes     Alcohol/week: 1.0 standard drinks     Types: 1 Cans of beer per week     Comment: daily    Drug use: Yes     Types: Marijuana     Comment: once every other weekend (+ on drug screen 05/15/18)    Sexual activity: None   Other Topics Concern    None   Social History Narrative    ** Merged History Encounter **         ** Merged History Encounter **          Social Determinants of Health     Financial Resource Strain:     Difficulty of Paying Living Expenses:    Food Insecurity:     Worried About Running Out of Food in the Last Year: signs or Co-signsthis chart in the absence of a cardiologist.    Sinus rhythm at a rate of 95 beats a minute with no acute ST elevations or depressions or pathologic Q waves. Acute T wave inversions in his anterior lateral precordial leads. These appear to be new compared to previous EKGs. RADIOLOGY:   Non-plain filmimages such as CT, Ultrasound and MRI are read by the radiologist. Plain radiographic images are visualized and preliminarily interpreted by the emergency physician with the below findings:    See below    Interpretation per the Radiologist below, if available at the time ofthis note: All incidental findings were discussed with the patient. CT CHEST WO CONTRAST   Final Result   1. No acute airspace abnormality. No evidence of underlying pneumonia. 2. Moderate centrilobular emphysema and chronic interstitial lung disease   with overall pattern favoring UIP. XR CHEST PORTABLE   Final Result   Subtle patchy hazy opacities noted in the bilateral upper lungs which may   reflect airspace disease in the correct clinical setting. Possible spiculated nodule noted in the left upper lung. Further evaluation   with nonemergent chest CT is recommended.                ED BEDSIDE ULTRASOUND:   Performed by ED Physician - none    LABS:  Labs Reviewed   CBC WITH AUTO DIFFERENTIAL - Abnormal; Notable for the following components:       Result Value    RBC 3.45 (*)     Hemoglobin 10.4 (*)     Hematocrit 30.8 (*)     RDW 20.4 (*)     Platelets 92 (*)     All other components within normal limits    Narrative:     Performed at:  [facility] [address], [place], [address]   Phone [phone]   COMPREHENSIVE METABOLIC PANEL W/ REFLEX TO MG FOR LOW K - Abnormal; Notable for the following components:    Potassium reflex Magnesium 3.4 (*)     Chloride 92 (*)     Glucose 151 (*)     BUN 71 (*)     CREATININE 2.1 (*)     GFR Non- 33 (*)     GFR  39 (*)     Total Protein 8.5 (*)     Albumin 3.2 (*)     Albumin/Globulin Ratio 0.6 (*)     Total Bilirubin 1.5 (*)     AST 56 (*)     All other components within normal limits    Narrative:     Performed at:  [facility] [address] [place], [place] [employer]   Phone [phone]   BRAIN NATRIURETIC PEPTIDE - Abnormal; Notable for the following components:    Pro- (*)     All other components within normal limits    Narrative:     Performed at:  [facility] [address] [place], [employer]   Phone [phone]   LACTATE, SEPSIS - Abnormal; Notable for the following components:    Lactic Acid, Sepsis 3.8 (*)     All other components within normal limits    Narrative:     Performed at:  [facility] [address] [place]   Phone [phone]   MAGNESIUM - Abnormal; Notable for the following components:    Magnesium 1.60 (*)     All other components within normal limits    Narrative:     Performed at:  [facility] [address] [facility] [place] [employer]   Phone [phone]   CULTURE, BLOOD 1   CULTURE, BLOOD 2   TROPONIN    Narrative:     Performed at:  [facility] [address], [employer]   Phone [phone]   LACTATE, SEPSIS    Narrative:     Performed at:  [facility] [address], [employer]   Phone [phone]   ETHANOL    Narrative:     Performed at:  [facility] [address] [place]   Phone [phone]   URINE RT REFLEX TO CULTURE       All other labs were within normal range or not returned as of this dictation.     EMERGENCY DEPARTMENT COURSE and DIFFERENTIAL DIAGNOSIS/MDM:   Vitals:    Vitals:    06/21/21 1130 06/21/21 1136 06/21/21 1146 06/21/21 1200   BP: (!) 146/80 (!) 146/80 118/74 114/82   Pulse:  126 115 103   Resp:  16 27 20   Temp:  98.1 °F (36.7 °C)     SpO2: 98% 100% 100% 99%   Weight:  103 lb (46.7 kg)     Height:  5' 11\" (1.803 m)             MDM    Well-nourished male in no acute distress. The patient's work-up today is consistent with sepsis. The patient is tachycardic, his lactic acid level was elevated with an acute kidney injury. The patient was given several liters of normal saline. He will be dosed with antibiotics and blood cultures are pending. The patient be admitted for further care and work-up as needed. The patient is currently in stable condition and alert and oriented x3. Finally, the patient also had some acute T wave inversions in his anterior precordial leads of unknown importance. His troponin is negative. These appear to be new compared to previous EKGs. He denies having any chest pain. The patient's first troponin was negative as well. REASSESSMENT          CRITICAL CARE TIME   Total Critical Care time was 45 minutes, excluding separatelyreportable procedures. There was a high probability ofclinically significant/life threatening deterioration in the patient's condition which required my urgent intervention. CONSULTS:  IP CONSULT TO HOSPITALIST    PROCEDURES:  Unless otherwise noted below, none     Procedures    FINAL IMPRESSION      1. Acute kidney injury ([address]    2. Septicemia ([facility]    3. Herpes simplex infection of penis          DISPOSITION/PLAN   DISPOSITION Decision To Admit 06/21/2021 03:21:26 PM      PATIENT REFERREDTO:  No follow-up provider specified. DISCHARGEMEDICATIONS:  New Prescriptions    No medications on file     Controlled Substances Monitoring:     No flowsheet data found.     (Please note that portions of this note were completed with a voice recognition program.  Efforts were made to edit the dictations but occasionally words are mis-transcribed.)    Miryam Miller MD (electronically signed)  Attending Emergency Physician          Lila Gant MD  06/21/21 2205 [address], [place]  06/21/21 2[address], [place]  06/21/21 9652

## 2021-06-22 ENCOUNTER — APPOINTMENT (OUTPATIENT)
Dept: CT IMAGING | Age: 56
DRG: 682 | End: 2021-06-22
Payer: MEDICARE

## 2021-06-22 LAB
ALBUMIN SERPL-MCNC: 2.4 G/DL (ref 3.4–5)
ANION GAP SERPL CALCULATED.3IONS-SCNC: 10 MMOL/L (ref 3–16)
BASOPHILS ABSOLUTE: 0 K/UL (ref 0–0.2)
BASOPHILS RELATIVE PERCENT: 0.4 %
BUN BLDV-MCNC: 42 MG/DL (ref 7–20)
C. TRACHOMATIS DNA ,URINE: NEGATIVE
CALCIUM IONIZED: 1 MMOL/L (ref 1.12–1.32)
CALCIUM SERPL-MCNC: 7.8 MG/DL (ref 8.3–10.6)
CHLORIDE BLD-SCNC: 101 MMOL/L (ref 99–110)
CHLORIDE URINE RANDOM: <20 MMOL/L
CO2: 27 MMOL/L (ref 21–32)
CREAT SERPL-MCNC: 1.3 MG/DL (ref 0.9–1.3)
CREATININE URINE: 107 MG/DL (ref 39–259)
EKG ATRIAL RATE: 95 BPM
EKG DIAGNOSIS: NORMAL
EKG P AXIS: 80 DEGREES
EKG P-R INTERVAL: 128 MS
EKG Q-T INTERVAL: 396 MS
EKG QRS DURATION: 84 MS
EKG QTC CALCULATION (BAZETT): 497 MS
EKG R AXIS: 86 DEGREES
EKG T AXIS: 66 DEGREES
EKG VENTRICULAR RATE: 95 BPM
EOSINOPHILS ABSOLUTE: 0.3 K/UL (ref 0–0.6)
EOSINOPHILS RELATIVE PERCENT: 3.3 %
FERRITIN: 491.7 NG/ML (ref 30–400)
FOLATE: >20 NG/ML (ref 4.78–24.2)
GFR AFRICAN AMERICAN: >60
GFR NON-AFRICAN AMERICAN: 57
GLUCOSE BLD-MCNC: 103 MG/DL (ref 70–99)
GLUCOSE BLD-MCNC: 117 MG/DL (ref 70–99)
HAPTOGLOBIN: 55 MG/DL (ref 30–200)
HAV IGM SER IA-ACNC: NORMAL
HCT VFR BLD CALC: 22.7 % (ref 40.5–52.5)
HCT VFR BLD CALC: 24 % (ref 40.5–52.5)
HEMOGLOBIN: 8.1 G/DL (ref 13.5–17.5)
HEPATITIS B CORE IGM ANTIBODY: NORMAL
HEPATITIS B SURFACE ANTIGEN INTERPRETATION: NORMAL
HEPATITIS C ANTIBODY INTERPRETATION: NORMAL
IGA: 1111 MG/DL (ref 70–400)
IGG: 1588 MG/DL (ref 700–1600)
IGM: 123 MG/DL (ref 40–230)
IMMATURE RETIC FRACT: 0.72 (ref 0.21–0.37)
IRON SATURATION: 28 % (ref 20–50)
IRON: 49 UG/DL (ref 59–158)
LYMPHOCYTES ABSOLUTE: 3.5 K/UL (ref 1–5.1)
LYMPHOCYTES RELATIVE PERCENT: 45.5 %
MAGNESIUM: 1.7 MG/DL (ref 1.8–2.4)
MCH RBC QN AUTO: 30.5 PG (ref 26–34)
MCHC RBC AUTO-ENTMCNC: 33.7 G/DL (ref 31–36)
MCV RBC AUTO: 90.4 FL (ref 80–100)
MONOCYTES ABSOLUTE: 0.5 K/UL (ref 0–1.3)
MONOCYTES RELATIVE PERCENT: 6.6 %
N. GONORRHOEAE DNA, URINE: NEGATIVE
NEUTROPHILS ABSOLUTE: 3.4 K/UL (ref 1.7–7.7)
NEUTROPHILS RELATIVE PERCENT: 44.2 %
PDW BLD-RTO: 19.9 % (ref 12.4–15.4)
PERFORMED ON: ABNORMAL
PH VENOUS: 7.5 (ref 7.35–7.45)
PHOSPHORUS: 2.9 MG/DL (ref 2.5–4.9)
PHOSPHORUS: 3.1 MG/DL (ref 2.5–4.9)
PLATELET # BLD: 64 K/UL (ref 135–450)
PMV BLD AUTO: 8.5 FL (ref 5–10.5)
POTASSIUM SERPL-SCNC: 3.6 MMOL/L (ref 3.5–5.1)
POTASSIUM, UR: 20.1 MMOL/L
RBC # BLD: 2.65 M/UL (ref 4.2–5.9)
RETICULOCYTE ABSOLUTE COUNT: 0.05 M/UL
RETICULOCYTE COUNT PCT: 1.87 % (ref 0.5–2.18)
SODIUM BLD-SCNC: 138 MMOL/L (ref 136–145)
SODIUM URINE: <20 MMOL/L
TOTAL IRON BINDING CAPACITY: 172 UG/DL (ref 260–445)
URIC ACID, SERUM: 10.6 MG/DL (ref 3.5–7.2)
VITAMIN B-12: 1338 PG/ML (ref 211–911)
WBC # BLD: 7.6 K/UL (ref 4–11)

## 2021-06-22 PROCEDURE — 6370000000 HC RX 637 (ALT 250 FOR IP): Performed by: HOSPITALIST

## 2021-06-22 PROCEDURE — 82728 ASSAY OF FERRITIN: CPT

## 2021-06-22 PROCEDURE — 84100 ASSAY OF PHOSPHORUS: CPT

## 2021-06-22 PROCEDURE — 93010 ELECTROCARDIOGRAM REPORT: CPT | Performed by: INTERNAL MEDICINE

## 2021-06-22 PROCEDURE — 86780 TREPONEMA PALLIDUM: CPT

## 2021-06-22 PROCEDURE — 99233 SBSQ HOSP IP/OBS HIGH 50: CPT | Performed by: INTERNAL MEDICINE

## 2021-06-22 PROCEDURE — 84238 ASSAY NONENDOCRINE RECEPTOR: CPT

## 2021-06-22 PROCEDURE — 83550 IRON BINDING TEST: CPT

## 2021-06-22 PROCEDURE — 83540 ASSAY OF IRON: CPT

## 2021-06-22 PROCEDURE — 83735 ASSAY OF MAGNESIUM: CPT

## 2021-06-22 PROCEDURE — 74177 CT ABD & PELVIS W/CONTRAST: CPT

## 2021-06-22 PROCEDURE — 85025 COMPLETE CBC W/AUTO DIFF WBC: CPT

## 2021-06-22 PROCEDURE — 86038 ANTINUCLEAR ANTIBODIES: CPT

## 2021-06-22 PROCEDURE — 80074 ACUTE HEPATITIS PANEL: CPT

## 2021-06-22 PROCEDURE — 84550 ASSAY OF BLOOD/URIC ACID: CPT

## 2021-06-22 PROCEDURE — 6360000004 HC RX CONTRAST MEDICATION: Performed by: NURSE PRACTITIONER

## 2021-06-22 PROCEDURE — 80069 RENAL FUNCTION PANEL: CPT

## 2021-06-22 PROCEDURE — 86702 HIV-2 ANTIBODY: CPT

## 2021-06-22 PROCEDURE — 82607 VITAMIN B-12: CPT

## 2021-06-22 PROCEDURE — 1200000000 HC SEMI PRIVATE

## 2021-06-22 PROCEDURE — 6360000002 HC RX W HCPCS: Performed by: INTERNAL MEDICINE

## 2021-06-22 PROCEDURE — 2500000003 HC RX 250 WO HCPCS: Performed by: INTERNAL MEDICINE

## 2021-06-22 PROCEDURE — 86039 ANTINUCLEAR ANTIBODIES (ANA): CPT

## 2021-06-22 PROCEDURE — 6370000000 HC RX 637 (ALT 250 FOR IP): Performed by: INTERNAL MEDICINE

## 2021-06-22 PROCEDURE — 2580000003 HC RX 258: Performed by: HOSPITALIST

## 2021-06-22 PROCEDURE — 82746 ASSAY OF FOLIC ACID SERUM: CPT

## 2021-06-22 PROCEDURE — 2580000003 HC RX 258: Performed by: INTERNAL MEDICINE

## 2021-06-22 PROCEDURE — 85045 AUTOMATED RETICULOCYTE COUNT: CPT

## 2021-06-22 PROCEDURE — 86701 HIV-1ANTIBODY: CPT

## 2021-06-22 PROCEDURE — 36415 COLL VENOUS BLD VENIPUNCTURE: CPT

## 2021-06-22 PROCEDURE — 87390 HIV-1 AG IA: CPT

## 2021-06-22 PROCEDURE — 83010 ASSAY OF HAPTOGLOBIN QUANT: CPT

## 2021-06-22 PROCEDURE — 82784 ASSAY IGA/IGD/IGG/IGM EACH: CPT

## 2021-06-22 PROCEDURE — 82330 ASSAY OF CALCIUM: CPT

## 2021-06-22 RX ORDER — CIPROFLOXACIN 2 MG/ML
400 INJECTION, SOLUTION INTRAVENOUS EVERY 12 HOURS
Status: DISCONTINUED | OUTPATIENT
Start: 2021-06-22 | End: 2021-06-25

## 2021-06-22 RX ORDER — MAGNESIUM SULFATE 1 G/100ML
1000 INJECTION INTRAVENOUS PRN
Status: DISCONTINUED | OUTPATIENT
Start: 2021-06-22 | End: 2021-06-25 | Stop reason: HOSPADM

## 2021-06-22 RX ORDER — MAGNESIUM SULFATE IN WATER 40 MG/ML
2000 INJECTION, SOLUTION INTRAVENOUS ONCE
Status: COMPLETED | OUTPATIENT
Start: 2021-06-22 | End: 2021-06-22

## 2021-06-22 RX ADMIN — DOXYCYCLINE HYCLATE 100 MG: 100 TABLET, COATED ORAL at 07:54

## 2021-06-22 RX ADMIN — FOLIC ACID 1 MG: 1 TABLET ORAL at 07:54

## 2021-06-22 RX ADMIN — RISPERIDONE 1 MG: 1 TABLET ORAL at 20:08

## 2021-06-22 RX ADMIN — MAGNESIUM GLUCONATE 500 MG ORAL TABLET 400 MG: 500 TABLET ORAL at 20:11

## 2021-06-22 RX ADMIN — Medication 10 ML: at 07:53

## 2021-06-22 RX ADMIN — IOPAMIDOL 75 ML: 755 INJECTION, SOLUTION INTRAVENOUS at 11:00

## 2021-06-22 RX ADMIN — ACETAMINOPHEN 650 MG: 325 TABLET ORAL at 11:27

## 2021-06-22 RX ADMIN — LEVETIRACETAM 750 MG: 500 TABLET ORAL at 20:08

## 2021-06-22 RX ADMIN — MAGNESIUM SULFATE HEPTAHYDRATE 2000 MG: 40 INJECTION, SOLUTION INTRAVENOUS at 11:25

## 2021-06-22 RX ADMIN — SODIUM CHLORIDE, POTASSIUM CHLORIDE, SODIUM LACTATE AND CALCIUM CHLORIDE: 600; 310; 30; 20 INJECTION, SOLUTION INTRAVENOUS at 04:06

## 2021-06-22 RX ADMIN — LEVETIRACETAM 750 MG: 500 TABLET ORAL at 07:54

## 2021-06-22 RX ADMIN — THIAMINE HCL TAB 100 MG 100 MG: 100 TAB at 07:54

## 2021-06-22 RX ADMIN — CIPROFLOXACIN 400 MG: 2 INJECTION, SOLUTION INTRAVENOUS at 18:04

## 2021-06-22 RX ADMIN — METRONIDAZOLE 500 MG: 500 INJECTION, SOLUTION INTRAVENOUS at 16:50

## 2021-06-22 RX ADMIN — MAGNESIUM GLUCONATE 500 MG ORAL TABLET 400 MG: 500 TABLET ORAL at 07:57

## 2021-06-22 RX ADMIN — ACETAMINOPHEN 650 MG: 325 TABLET ORAL at 20:08

## 2021-06-22 ASSESSMENT — ENCOUNTER SYMPTOMS
COUGH: 0
SORE THROAT: 0
EYE DISCHARGE: 0
NAUSEA: 0
WHEEZING: 0
ABDOMINAL PAIN: 0
BACK PAIN: 0
CONSTIPATION: 0
SHORTNESS OF BREATH: 0
EYE REDNESS: 0
TROUBLE SWALLOWING: 0
RHINORRHEA: 0
DIARRHEA: 0

## 2021-06-22 ASSESSMENT — PAIN SCALES - GENERAL
PAINLEVEL_OUTOF10: 0
PAINLEVEL_OUTOF10: 2
PAINLEVEL_OUTOF10: 4

## 2021-06-22 ASSESSMENT — PAIN DESCRIPTION - FREQUENCY: FREQUENCY: CONTINUOUS

## 2021-06-22 ASSESSMENT — PAIN DESCRIPTION - ORIENTATION: ORIENTATION: RIGHT;LEFT

## 2021-06-22 ASSESSMENT — PAIN DESCRIPTION - DESCRIPTORS: DESCRIPTORS: ACHING

## 2021-06-22 ASSESSMENT — PAIN DESCRIPTION - LOCATION: LOCATION: FOOT

## 2021-06-22 ASSESSMENT — PAIN DESCRIPTION - PAIN TYPE: TYPE: CHRONIC PAIN

## 2021-06-22 NOTE — PROGRESS NOTES
Infectious Diseases   Progress Note      Admission Date: 6/21/2021  Hospital Day: Hospital Day: 2   Attending: Paola Jackson MD  Date of service: 6/22/2021     Chief complaint/ Reason for consult:     · Penile rash - *scabies versus bacterial infection  · Hypokalemia  · Hypomagnesemia  · Acute kidney injury with elevated serum creatinine of 2.1  · Transaminitis with elevated AST of 56   · Elevated total bilirubin of 1.5  · History of streptococcal bacteremia in January 2021    Microbiology:        I have reviewed allavailable micro lab data and cultures    · Blood culture (2/2) - collected on 6/21/2021: In process        Antibiotics and immunizations:       Current antibiotics: All antibiotics and their doses were reviewed by me    Recent Abx Admin                   doxycycline hyclate (VIBRA-TABS) tablet 100 mg (mg) 100 mg Given 06/22/21 0754     100 mg Given 06/21/21 2204    ivermectin tablet 9 mg (mg) 9 mg Given 06/21/21 1943                  Immunization History: All immunization history was reviewed by me today. There is no immunization history for the selected administration types on file for this patient. Known drug allergies: All allergies were reviewed and updated    Allergies   Allergen Reactions    Carbamazepine Other (See Comments)     Thrombocytopenia    Codeine Itching     Per ER    Phenytoin Itching    Sulfa Antibiotics      Per ER       Social history:     Social History:  All social andepidemiologic history was reviewed and updated by me today as needed. · Tobacco use:   reports that he has been smoking. He has a 40.00 pack-year smoking history. He has never used smokeless tobacco.  · Alcohol use:   reports current alcohol use of about 1.0 standard drinks of alcohol per week. · Currently lives in: [place] [address]  ·  reports current drug use. Drug: Marijuana.      COVID VACCINATION AND LAB RESULT RECORDS:     Internal Administration   First Dose      Second Dose Last COVID Lab SARS-CoV-2, NAAT (no units)   Date Value   12/30/2020 Not Detected            Assessment:     The patient is a 60 y.o. old male who  has a past medical history of Alcohol abuse, Cancer ([facility] [address] and Seizures ([facility] [address] with following problems:    · Penile rash - *scabies versus bacterial infection-s/p ivermectin on 6/21/2021  · Hypokalemia-being corrected  · Hypomagnesemia-being corrected  · Acute kidney injury-improving  · Transaminitis with elevated AST of 56-repeat liver enzymes tomorrow  · Elevated uric acid  · Elevated total bilirubin of 1.5  · History of streptococcal bacteremia in January 2021  · Seizure disorder  · History of marijuana abuse  · Chronic small vessel ischemia  · Smoker  · Alcohol abuse  · Emaciation: Body mass index is 14.37 kg/m². Discussion:      Blood cultures from admission are in process. Urine gonorrhea and chlamydia screen was negative. I have given him a dose of ivermectin yesterday due to suspected penile scabies    Serum creatinine is 1.3. His uric acid was elevated at 10.6. Urine toxicology was positive for cannabis. Acute viral hepatitis screen was negative. Immunoglobulin levels were checked and are adequate. He had a CT scan of abdomen and pelvis with IV contrast done yesterday. Images reviewed. The CT was concerning for enterocolitis. Acute cholecystitis could not be ruled out as well. The patient is very febrile. Plan:     Diagnostic Workup:    · Follow-up on blood cultures  · Follow-up on pending HIV screen  · We will order gallbladder ultrasound. · Continue to follow  fever curve, WBC count and blood cultures. · Continue to monitor blood counts, liver and renal function. Antimicrobials:    · Will  stop empiric doxycycline today  · Due to CT concerning for enterocolitis, will order IV Cipro 400 mg every 12 hours  · Will order p.o.  Flagyl 500 mg every 8 hours  · We will follow up on the culture results and clinical progress and will make further recommendations accordingly. · Continue close vitals monitoring. · Maintain good glycemic control. · Fall precautions. Aspiration precautions. · Continue to watch for new fever or diarrhea. · DVT prophylaxis. · Discussed all above with patient and RN. Drug Monitoring:    · Continue monitoring for antibiotic toxicity as follows: CBC, CMP   · Continue to watch for following: new or worsening fever, new hypotension, hives, lip swelling and redness or purulence at vascular access sites. I/v access Management:    · Continue to monitor i.v access sites for erythema, induration, discharge or tenderness. · As always, continue efforts to minimize tubes/lines/drains as clinically appropriate to reduce chances of line associated infections. Patient education and counseling:        · The patient was educated in detail about the side-effects of various antibiotics and things to watch for like new rashes, lip swelling, severe reaction, worsening diarrhea, break through fever etc.  · Discussed patient's condition and what to expect. All of the patient's questions were addressed in a satisfactory manner and patient verbalized understanding all instructions. Level of complexity of visit: High     TIME SPENT TODAY:     - Spent over  36 minutes on visit (including interval history, physical exam, review of data including labs, cultures, imaging, development and implementation of treatment plan and coordination of complex care). More than 50 percent of this includes face-to-face time spent with the patient for counseling and coordination of care. Thank you for involving me in the care of your patient. I will continue to follow. If you have anyadditional questions, please do not hesitate to contact me. Subjective: Interval history: Interval history was obtained from chart review and patient/ RN. The patient is afebrile. Feels tired. He is tolerating antibiotics okay. Nose: Nose normal. No congestion or rhinorrhea. Mouth/Throat:      Mouth: Mucous membranes are moist.      Pharynx: No oropharyngeal exudate or posterior oropharyngeal erythema. Eyes:      General: No scleral icterus. Right eye: No discharge. Left eye: No discharge. Conjunctiva/sclera: Conjunctivae normal.      Pupils: Pupils are equal, round, and reactive to light. Cardiovascular:      Rate and Rhythm: Normal rate and regular rhythm. Pulses: Normal pulses. Heart sounds: No murmur heard. No friction rub. Pulmonary:      Effort: Pulmonary effort is normal. No respiratory distress. Breath sounds: Normal breath sounds. No stridor. No wheezing, rhonchi or rales. Abdominal:      General: Bowel sounds are normal.      Palpations: Abdomen is soft. Tenderness: There is no abdominal tenderness. There is no right CVA tenderness, left CVA tenderness, guarding or rebound. Musculoskeletal:         General: No swelling or tenderness. Normal range of motion. Cervical back: Normal range of motion and neck supple. No rigidity. No muscular tenderness. Lymphadenopathy:      Cervical: No cervical adenopathy. Skin:     General: Skin is warm and dry. Coloration: Skin is not jaundiced. Findings: Rash present. No erythema. Comments: Penile area papular rash noted again, scabies suspected. Rash also involves the scrotum area. No lesions suggestive of herpes. Neurological:      General: No focal deficit present. Mental Status: He is alert and oriented to person, place, and time. Mental status is at baseline. Motor: No abnormal muscle tone. Psychiatric:         Mood and Affect: Mood normal.         Behavior: Behavior normal.         Thought Content: Thought content normal.           Lines: All vascular access sites are healthy with no local erythema, discharge or tenderness. Intake and output:    I/O last 3 completed shifts:   In: 4611.6 [P.O.:840; I.V.:1720.9; IV Piggyback:2050.7]  Out: 1275 [Urine:1275]    Lab Data:   All available labs and old records have been reviewed by me. CBC:  Recent Labs     06/21/21  1152 06/22/21  0504 06/22/21  0956   WBC 10.0 7.6  --    RBC 3.45* 2.65*  --    HGB 10.4* 8.1*  --    HCT 30.8* 24.0* 22.7*   PLT 92* 64*  --    MCV 89.5 90.4  --    MCH 30.1 30.5  --    MCHC 33.7 33.7  --    RDW 20.4* 19.9*  --         BMP:  Recent Labs     06/21/21  1152 06/22/21  0504    138   K 3.4* 3.6   CL 92* 101   CO2 32 27   BUN 71* 42*   CREATININE 2.1* 1.3   CALCIUM 9.1 7.8*   GLUCOSE 151* 103*        Hepatic Function Panel:   Lab Results   Component Value Date    ALKPHOS 113 06/21/2021    ALT 30 06/21/2021    AST 56 06/21/2021    PROT 8.5 06/21/2021    BILITOT 1.5 06/21/2021    BILIDIR 0.4 01/01/2021    IBILI 0.4 01/01/2021    LABALBU 2.4 06/22/2021       CPK:   Lab Results   Component Value Date    CKTOTAL 118 12/29/2020     ESR: No results found for: SEDRATE  CRP: No results found for: CRP        Imaging: All pertinent images and reports for the current visit were reviewed by me during this visit. CT ABDOMEN PELVIS W IV CONTRAST Additional Contrast? None   Final Result   Mild wall thickening involving the small bowel and ascending colon with mild   adjacent inflammatory changes. Findings concerning for enterocolitis. Gallbladder wall is hyperenhancing and thickened with mild adjacent   inflammatory stranding. While the gallbladder wall thickening may be related   to the patient's liver disease, acute cholecystitis is not excluded. Clinical correlation recommended. If indicated, right upper quadrant   ultrasound may be helpful for further evaluation. Cirrhosis with trace ascites. CT CHEST WO CONTRAST   Final Result   1. No acute airspace abnormality. No evidence of underlying pneumonia.    2. Moderate centrilobular emphysema and chronic interstitial lung disease   with overall pattern favoring UIP. XR CHEST PORTABLE   Final Result   Subtle patchy hazy opacities noted in the bilateral upper lungs which may   reflect airspace disease in the correct clinical setting. Possible spiculated nodule noted in the left upper lung. Further evaluation   with nonemergent chest CT is recommended. Medications: All current and past medications were reviewed.      folic acid  1 mg Oral Daily    levETIRAcetam  750 mg Oral BID    magnesium oxide  400 mg Oral BID    risperiDONE  1 mg Oral Nightly    thiamine mononitrate  100 mg Oral Daily    sodium chloride flush  5-40 mL Intravenous 2 times per day    doxycycline hyclate  100 mg Oral 2 times per day        sodium chloride      lactated ringers 75 mL/hr at 06/22/21 1332       magnesium sulfate, sodium chloride flush, sodium chloride, ondansetron **OR** ondansetron, polyethylene glycol, acetaminophen **OR** acetaminophen      Problem list:       Patient Active Problem List   Diagnosis Code    Partial symptomatic epilepsy with complex partial seizures, intractable, without status epilepticus ([facility] [address] G40.219    Noncompliance with medication regimen Z91.14    Seizures ([address] R56.9    Alcohol abuse F10.10    Pneumonia J18.9    Nausea and vomiting R11.2    Breakthrough seizure ([address] G40.919    Acute febrile illness R50.9    Sepsis ([address] A41.9    Lactic acid acidosis E87.2    Smoker F17.200    Cerebrovascular small vessel disease I67.9    Streptococcal bacteremia R78.81, B95.5    Streptococcus viridans infection A49.1    Drug abuse counseling and surveillance of drug abuser Z71.51    Tobacco abuse counseling Z71.6    Acute kidney injury ([address] N17.9    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

## 2021-06-22 NOTE — CONSULTS
[facility]   CONSULT NOTE    6/22/2021 9:58 AM    Patient Information: Melvi Cordero   Date of Admit:  6/21/2021  Primary Care Physician:  No primary care provider on file. Requesting Physician:  Slim Mooney MD    Reason for consult:   Evaluation and recommendations for thrombocytopenia    Chief complaint:    Chief Complaint   Patient presents with    Nausea     x2 days    Emesis     x2 days    Diarrhea     x2 days    Rash     on groin       History of Present Illness:  Melvi Cordero is a 60 y.o. male on Slim Mooney MD service who was admitted on 6/21/2021 with a 2 day history of nausea, vomiting, diarrhea, and rash. He was noted to have a platelet count of 94 on admission, which dropped to 64 today. He has no bleeding or unusual bruising. He was also noted to have anemia. He appears to have a history of anemia, baseline hgb around 9. He is a resident at a group home. History of seizure disorder, on Keppra, and developmental delay. He is alert, oriented, and pleasant. He denies abdominal pain, dizziness, or lightheadedness. No melena or hematochezia. Past Medical History:     has a past medical history of Alcohol abuse, Cancer ([facility] [address] and Seizures ([facility] [address] Past Surgical History:    History reviewed. No pertinent surgical history. Current Medications:     folic acid  1 mg Oral Daily    levETIRAcetam  750 mg Oral BID    magnesium oxide  400 mg Oral BID    risperiDONE  1 mg Oral Nightly    thiamine mononitrate  100 mg Oral Daily    sodium chloride flush  5-40 mL Intravenous 2 times per day    doxycycline hyclate  100 mg Oral 2 times per day       Allergies: Allergies   Allergen Reactions    Carbamazepine Other (See Comments)     Thrombocytopenia    Codeine Itching     Per ER    Phenytoin Itching    Sulfa Antibiotics      Per ER        Social History:    reports that he has been smoking. He has a 40.00 pack-year smoking history.  He has never used smokeless tobacco. He reports current alcohol use of about 1.0 standard drinks of alcohol per week. He reports current drug use. Drug: Marijuana. Family History:     family history is not on file. ROS:    Constitutional:  Negative for fever, chills or night sweats  Eyes:  Negative for exudate, itching  Ears:  Negative for drainage   Nose:  Negative for rhinorrhea, epistaxis  Mouth/Throat:  Negative for hoarseness, sore throat. Respiratory:   Negative for shortness of breath, hemoptysis, wheezing  Cardiovascular: Negative for chest pain, palpitations   Gastrointestinal:  Negative for nausea, vomiting, diarrhea, black stool, bright red blood in the stool  Genitourinary:  Negative for dysuria, hematuria   Hematologic/Lymphatic:  Negative for  bleeding tendency, easy bruising  Musculoskeletal:  Negative for myalgias, arthralgias  Neurologic:  Negative for  confusion,dysarthria. Skin :  Negative for itching, rash  Psychiatric:  Negative for depression, anxiety. Endocrine:  Negative for polydipsia, polyuria, heat /cold intolerance. PHYSICAL EXAM:    Vitals:  Vitals:    06/22/21 0748   BP: 107/64   Pulse: 85   Resp: 16   Temp: 97.5 °F (36.4 °C)   SpO2: 99%        Intake/Output Summary (Last 24 hours) at 6/22/2021 0452  Last data filed at 6/22/2021 0004  Gross per 24 hour   Intake --   Output 400 ml   Net -400 ml      Wt Readings from Last 3 Encounters:   06/21/21 107 lb (48.5 kg)   01/04/21 116 lb 2.9 oz (52.7 kg)   12/27/19 150 lb (68 kg)        General appearance: Appears comfortable, very thin  Eyes: Sclera clear, pupils equal  ENT: Moist mucus membranes, no thrush  Neck: Trachea midline, symmetrical  Cardiovascular: Regular rhythm, normal S1, S2. No murmur, gallop, rub. No edema in  lower extremities  Respiratory: Clear to auscultation bilaterally. No wheeze.  Good inspiratory effort  Gastrointestinal: Abdomen soft, not tender, not distended, normal bowel sounds  Musculoskeletal: No cyanosis in digits, warm extremities  Neurologic: Cranial nerves grossly intact, no motor or speech deficits. Psychiatric: Normal affect. Alert and oriented to time, place and person. Skin: Warm, dry, normal turgor, no rash    DATA:  CBC:   Lab Results   Component Value Date    WBC 7.6 06/22/2021    RBC 2.65 06/22/2021    HGB 8.1 06/22/2021    HCT 24.0 06/22/2021    MCV 90.4 06/22/2021    MCH 30.5 06/22/2021    MCHC 33.7 06/22/2021    RDW 19.9 06/22/2021    PLT 64 06/22/2021    MPV 8.5 06/22/2021     BMP:  Lab Results   Component Value Date     06/22/2021    K 3.6 06/22/2021    K 3.4 06/21/2021     06/22/2021    CO2 27 06/22/2021    BUN 42 06/22/2021    CREATININE 1.3 06/22/2021    CALCIUM 7.8 06/22/2021    GFRAA >60 06/22/2021    LABGLOM 57 06/22/2021    GLUCOSE 103 06/22/2021     Magnesium:   Lab Results   Component Value Date    MG 1.70 06/22/2021    MG 1.60 06/21/2021    MG 1.30 01/04/2021     LIVER PROFILE:   Recent Labs     06/21/21  1152   AST 56*   ALT 30   BILITOT 1.5*   ALKPHOS 113     PT/INR:    Lab Results   Component Value Date    PROTIME 12.0 05/16/2018    INR 1.06 05/16/2018     CT CHEST WO CONTRAST    Result Date: 6/21/2021  EXAMINATION: CT OF THE CHEST WITHOUT CONTRAST 6/21/2021 2:13 pm TECHNIQUE: CT of the chest was performed without the administration of intravenous contrast. Multiplanar reformatted images are provided for review. Dose modulation, iterative reconstruction, and/or weight based adjustment of the mA/kV was utilized to reduce the radiation dose to as low as reasonably achievable.  COMPARISON: Chest radiograph on the same date HISTORY: ORDERING SYSTEM PROVIDED HISTORY: pneumonia TECHNOLOGIST PROVIDED HISTORY: Reason for exam:->pneumonia Decision Support Exception - unselect if not a suspected or confirmed emergency medical condition->Emergency Medical Condition (MA) Reason for Exam: pneumonia Acuity: Unknown Type of Exam: Unknown FINDINGS: Mediastinum: Heart size is normal. Aorta and pulmonary size and enhancement. No evidence of hydronephrosis or hydroureter. GI/Bowel: Stomach is grossly unremarkable. Postsurgical changes are seen in the rectosigmoid. The small and large bowel are normal in caliber. There is wall thickening involving the cecum with mild adjacent inflammatory changes. There is mild stranding surrounding small bowel loops which appear mildly thickened and hyperenhancing. No evidence of obstruction. The appendix is visualized and appears normal. Pelvis: Bladder and prostate are without acute process. Peritoneum/Retroperitoneum: The visualized vasculature is without acute process. There there is mild diffuse infiltration of the mesentery likely related to edema. There is a small volume of ascites. No focal fluid collection or free intraperitoneal air. Bones/Soft Tissues: Soft tissues and osseous structures are without acute process. Mild wall thickening involving the small bowel and ascending colon with mild adjacent inflammatory changes. Findings concerning for enterocolitis. Gallbladder wall is hyperenhancing and thickened with mild adjacent inflammatory stranding. While the gallbladder wall thickening may be related to the patient's liver disease, acute cholecystitis is not excluded. Clinical correlation recommended. If indicated, right upper quadrant ultrasound may be helpful for further evaluation. Cirrhosis with trace ascites. XR CHEST PORTABLE    Result Date: 6/21/2021  EXAMINATION: ONE XRAY VIEW OF THE CHEST 6/21/2021 12:35 pm COMPARISON: Chest x-ray dated 12/31/2020 HISTORY: ORDERING SYSTEM PROVIDED HISTORY: nausea TECHNOLOGIST PROVIDED HISTORY: Reason for exam:->nausea Reason for Exam: Nausea (x2 days) Acuity: Acute Type of Exam: Initial FINDINGS: HEART/MEDIASTINUM: The cardiomediastinal silhouette is within normal limits.  PLEURA/LUNGS: Subtle patchy hazy opacities noted in the bilateral upper lungs which may reflect airspace disease in the correct clinical setting. Possible spiculated opacity noted in the left upper lung. There are no pleural effusions. There is no appreciable pneumothorax. BONES/SOFT TISSUE: No acute abnormality. Subtle patchy hazy opacities noted in the bilateral upper lungs which may reflect airspace disease in the correct clinical setting. Possible spiculated nodule noted in the left upper lung. Further evaluation with nonemergent chest CT is recommended. IMPRESSION/RECOMMENDATIONS:    Active Problems:    Drug abuse counseling and surveillance of drug abuser    Acute kidney injury ([facility] [address]  Resolved Problems:    * No resolved hospital problems. *       Anemia and thrombocytopenia  - normochromic/normocytic anemia  - check iron studies, B12/folate, haptoglobin, QIG, retic ct, CLARK. - CT abd/pelvis      Possible sepsis  - ID following  - on antibiotics  - blood cultures pending      This plan was discussed with the patient and he/she verbalized understanding. Thank you for allowing us to participate in the care of this patient. Lizbeth Johns CNP  [facility]    Patient was seen. History of 1 PPD and one can of beer every other day on average. Significant anemia and thrombocytopenia. Labs ordered and some are pending. Renal function has improved since admission. SPEP was added. May consider bone marrow aspiration and biopsy if the etiology remains unclear.       Yolis Hoyt MD

## 2021-06-22 NOTE — PROGRESS NOTES
4 Eyes Skin Assessment     NAME:  Lena Posey  YOB: 1965  MEDICAL RECORD NUMBER:  [id number]    The patient is being assess for  Admission    I agree that 2 RN's have performed a thorough Head to Toe Skin Assessment on the patient. ALL assessment sites listed below have been assessed. Areas assessed by both nurses:    Head, Face, Ears, Shoulders, Back, Chest, Arms, Elbows, Hands, Sacrum. Buttock, Coccyx, Ischium and Legs. Feet and Heels        Does the Patient have a Wound? Yes wound(s) were present on assessment.  LDA wound assessment was Initiated and completed        Lon Prevention initiated:  Yes   Wound Care Orders initiated:  No    Pressure Injury (Stage 3,4, Unstageable, DTI, NWPT, and Complex wounds) if present place consult order under [de-identified] No    New and Established Ostomies if present place consult order under : No      Nurse 1 eSignature: Electronically signed by Tre Stanford RN on 6/22/21 at 1:07 AM EDT    **SHARE this note so that the co-signing nurse is able to place an eSignature**    Nurse 2 eSignature: Electronically signed by Edith Vargas RN on 6/22/21 at 4:17 AM EDT

## 2021-06-22 NOTE — PROGRESS NOTES
Admission assessment completed. Rash noted to zainab-area, hip, and bottom. Will not be able to obtain culture due to there being no drainage. From group home. Alert and oriented X4. Patient oriented to the room. Call light is within reach.

## 2021-06-22 NOTE — PROGRESS NOTES
Head to toe assessment complete. Vital signs obtained. Pt resting in bed at this time. AM medication administered. Pt tolerated well. Pt denies pain. Pt denies further needs at this time. Call light in hand. Pt verbalizes correct use. Bed alarm set. Will continue to monitor.  Electronically signed by Dorota Escobar RN on 6/22/2021 at 7:58 AM

## 2021-06-22 NOTE — PROGRESS NOTES
Bedside rounding completed with Isabelle Núñez RN. Pt denies needs at this time. White board updated. Call light in hand and pt verbalizes correct use. All needs within reach. Bed alarm set. Will continue to monitor.  Electronically signed by Fatemeh Saavedra RN on 6/22/2021 at 7:21 AM

## 2021-06-22 NOTE — PROGRESS NOTES
MD Keren Luis MD Milinda Dus, MD               Office: [phone]                      Fax: [phone]             2 [facility]                   NEPHROLOGY INPATIENT PROGRESS NOTE:     PATIENT NAME: Carmencita Llanes  : 1965  MRN: [id number]       RECOMMENDATIONS:   - keep changed NS to LR : 100 -> reduce to 75 cc/hr, S/P 2L NS    - keep IVF as BP lower   - Mag repletion, order 1 g IV , keep PO   - HypoK better   - monitor PVR w/ bladder scan for españa insertion need. - HSV management per 1' team     D/C plan from renal stand point:  - new CATALINO , f/u - might need 1 more day as w/ lower BP    D/W patient, nurse       IMPRESSION:       Admitted for:  CATALINO (acute kidney injury) ([facility] [address] [N17.9]      CATALINO (on no CKD:):   - Oligouric  - BL Scr- 0.7-0.8 (till 2021 ---> 2.1 on admission  -: Etiology of CATALINO - pre-renal + Early ATN w/ hypovolemia    - other differentials: r/o obstruction,   - UA - bland, clear, so unlikely GN / TI / TMA process  - urine lytes = hypovolemia       Associated problems:   - Volume status: mild hypo-volemic  : BP: lower -  No need for tight control  : Na: WNL    - Azotemia: severe 71 : ?pre-renal, no steroids, ? UGIB w/ lower Hb    - Electrolytes: K: hypokalemia - mild   HypoMagnesemia - mild     - Lactic acidosis -?hypoperfusion+ CATALINO + starvation - alcohol ketosis    - Anemia: mild - follow        Other major problems: Management per primary and other consulting teams. U tox (+) for Cannabinoids     Hospital Problems         Last Modified POA    Drug abuse counseling and surveillance of drug abuser 2021 Yes    Acute kidney injury ([facility] [address] 2021 Yes        : other supportive care :   - Check daily renal function panel with electrolytes-phosphorus  - Strict monitoring of I/Os, daily weight  - Renal feeds/diet  - Current medications reviewed. - Nephrotoxic medications have been discontinued.     - Dose adjusted and appropriate. - Dose meds for eGFR <15 mL/min/1.73m2 during CATALINO    - Avoid heavy opioids due to renal failure - may use very low dose dilaudid / fentanyl with close monitoring of CNS and respiratory depression. Please refer to the orders. High Complexity. Multiple complex problems. Discussed with patient and treatment team-    Time spent > 30 (~35) minutes. Thank you for allowing me to participate in this patient's care. Please do not hesitate to contact me anytime. We will follow along with you. Tomy Stringer MD,  [facility] [address] [place] [address] [place]: [phone] or [address] [employer]  Fax: [phone]        ========================================================   ========================================================   SUBJECTIVE:  Patient was seen comfortably sitting up in the bed,   Reported no active complaints,   Renal function better   On IVF  BP lower. Past medical, Surgical, Social, Family medical history reviewed by me. MEDICATIONS: reviewed by me. Medications Prior to Admission:  No current facility-administered medications on file prior to encounter.      Current Outpatient Medications on File Prior to Encounter   Medication Sig Dispense Refill    phenytoin (DILANTIN) 100 MG ER capsule Take 250 mg by mouth 2 times daily      levETIRAcetam (KEPPRA) 750 MG tablet Take 1 tablet by mouth 2 times daily 60 tablet 3    risperiDONE (RISPERDAL) 1 MG tablet Take 1 mg by mouth nightly      folic acid (FOLVITE) 1 MG tablet Take 1 tablet by mouth daily 30 tablet 3    Multiple Vitamin (MULTIVITAMIN) tablet Take 1 tablet by mouth daily 30 tablet 3    vitamin B-1 100 MG tablet Take 1 tablet by mouth daily 30 tablet 3    magnesium oxide (MAG-OX) 400 (240 Mg) MG tablet Take 1 tablet by mouth 2 times daily 30 tablet 1    potassium chloride (KLOR-CON M) 20 MEQ extended release tablet Take 2 tablets by mouth 2 times daily (with meals) 60 tablet 0         Current Facility-Administered Medications:     magnesium sulfate 1000 mg in dextrose 5% 100 mL IVPB, 1,000 mg, Intravenous, PRN, Slim Mooney MD    folic acid (FOLVITE) tablet 1 mg, 1 mg, Oral, Daily, Toni Hairston MD, 1 mg at 06/22/21 0754    levETIRAcetam (KEPPRA) tablet 750 mg, 750 mg, Oral, BID, Toni Hairston MD, 750 mg at 06/22/21 0754    magnesium oxide (MAG-OX) tablet 400 mg, 400 mg, Oral, BID, Toni Hairston MD, 400 mg at 06/22/21 0757    risperiDONE (RISPERDAL) tablet 1 mg, 1 mg, Oral, Nightly, Toni Hairston MD, 1 mg at 06/21/21 2204    thiamine mononitrate tablet 100 mg, 100 mg, Oral, Daily, Kj Lorenzo MD, 100 mg at 06/22/21 0754    sodium chloride flush 0.9 % injection 5-40 mL, 5-40 mL, Intravenous, 2 times per day, Kj Lorenzo MD, 10 mL at 06/22/21 0753    sodium chloride flush 0.9 % injection 5-40 mL, 5-40 mL, Intravenous, PRN, Kj Lorenzo MD    0.9 % sodium chloride infusion, 25 mL, Intravenous, PRN, Kj Lorenzo MD    ondansetron (ZOFRAN-ODT) disintegrating tablet 4 mg, 4 mg, Oral, Q8H PRN **OR** ondansetron (ZOFRAN) injection 4 mg, 4 mg, Intravenous, Q6H PRN, Kj Lorenzo MD    polyethylene glycol (GLYCOLAX) packet 17 g, 17 g, Oral, Daily PRN, Kj Lorenzo MD    acetaminophen (TYLENOL) tablet 650 mg, 650 mg, Oral, Q6H PRN, 650 mg at 06/21/21 1830 **OR** acetaminophen (TYLENOL) suppository 650 mg, 650 mg, Rectal, Q6H PRN, Toni Hairston MD    0.9 % sodium chloride infusion, , Intravenous, Continuous, Toni Hairston MD    lactated ringers infusion, , Intravenous, Continuous, Yudelka Rivera MD, Last Rate: 100 mL/hr at 06/22/21 0406, New Bag at 06/22/21 0406    doxycycline hyclate (VIBRA-TABS) tablet 100 mg, 100 mg, Oral, 2 times per day, Rodney Sharma MD, 100 mg at 06/22/21 0754    REVIEW OF SYSTEMS:  As mentioned in chief complaint and HPI , Subjective    PHYSICAL EXAM:  Recent vital signs and recent I/Os reviewed by me.      Wt Readings from Last 3 Encounters:   06/21/21 107 lb (48.5 kg)   01/04/21 116 lb 2.9 oz (52.7 kg)   12/27/19 150 lb (68 kg)     BP Readings from Last 3 Encounters:   06/22/21 107/64   01/05/21 109/71   12/27/19 (!) 132/48     Patient Vitals for the past 24 hrs:   BP Temp Temp src Pulse Resp SpO2 Height Weight   06/22/21 0748 107/64 97.5 °F (36.4 °C) Oral 85 16 99 % -- --   06/22/21 0400 106/63 98.3 °F (36.8 °C) Oral 106 16 96 % -- --   06/22/21 0004 104/62 98 °F (36.7 °C) Oral 93 18 100 % -- --   06/21/21 1815 104/73 98 °F (36.7 °C) Oral 103 20 -- 5' 11\" (1.803 m) 107 lb (48.5 kg)   06/21/21 1630 117/68 -- -- 102 23 95 % -- --   06/21/21 1615 130/79 -- -- 99 20 100 % -- --   06/21/21 1600 127/71 -- -- 96 21 98 % -- --   06/21/21 1400 106/65 -- -- 99 18 100 % -- --   06/21/21 1345 124/70 -- -- 88 15 100 % -- --   06/21/21 1330 95/62 -- -- 99 20 99 % -- --   06/21/21 1315 103/66 -- -- 95 17 100 % -- --   06/21/21 1300 121/63 -- -- 96 17 98 % -- --   06/21/21 1245 128/72 -- -- 93 18 95 % -- --   06/21/21 1230 121/67 -- -- 96 18 95 % -- --   06/21/21 1215 125/67 -- -- 95 18 96 % -- --   06/21/21 1200 114/82 -- -- 103 20 99 % -- --   06/21/21 1146 118/74 -- -- 115 27 100 % -- --   06/21/21 1136 (!) 146/80 98.1 °F (36.7 °C) -- 126 16 100 % 5' 11\" (1.803 m) 103 lb (46.7 kg)   06/21/21 1130 (!) 146/80 -- -- -- -- 98 % -- --       Intake/Output Summary (Last 24 hours) at 6/22/2021 1029  Last data filed at 6/22/2021 0004  Gross per 24 hour   Intake --   Output 400 ml   Net -400 ml         Physical Exam  Vitals reviewed. Constitutional:       General: He is not in acute distress. Appearance: He is underweight. He is ill-appearing. HENT:      Head: Normocephalic and atraumatic. Right Ear: External ear normal.      Left Ear: External ear normal.      Nose: Nose normal.      Mouth/Throat:      Mouth: Mucous membranes are not dry. Eyes:      General: No scleral icterus. Conjunctiva/sclera: Conjunctivae normal.   Neck:      Vascular: No JVD.    Cardiovascular: Rate and Rhythm: Normal rate and regular rhythm. Heart sounds: S1 normal and S2 normal.   Pulmonary:      Effort: Pulmonary effort is normal. No respiratory distress. Breath sounds: Normal breath sounds. Abdominal:      General: Bowel sounds are normal.      Palpations: Abdomen is soft. Musculoskeletal:         General: No swelling or deformity. Cervical back: Normal range of motion and neck supple. Skin:     General: Skin is warm and dry. Neurological:      Mental Status: He is alert and oriented to person, place, and time. Mental status is at baseline. Psychiatric:         Mood and Affect: Mood normal.         Behavior: Behavior normal.         DATA:  Diagnostic tests reviewed by me for today's visit:   (AS NEEDED FOR MY EVALUATION AND MANAGEMENT). Recent Labs     06/21/21  1152 06/22/21  0504 06/22/21  0956   WBC 10.0 7.6  --    HCT 30.8* 24.0* 22.7*   PLT 92* 64*  --      Iron Saturation:  No components found for: PERCENTFE  FERRITIN:  No results found for: FERRITIN  IRON:  No results found for: IRON  TIBC:  No results found for: TIBC    Recent Labs     06/21/21  1152 06/22/21  0504    138   K 3.4* 3.6   CL 92* 101   CO2 32 27   BUN 71* 42*   CREATININE 2.1* 1.3     Recent Labs     06/21/21  1152 06/22/21  0503 06/22/21  0504   CALCIUM 9.1  --  7.8*   MG 1.60*  --  1.70*   PHOS  --  3.1 2.9     No results for input(s): PH, PCO2, PO2 in the last 72 hours.     Invalid input(s): Jf Short    ABG:  No results found for: PH, PCO2, PO2, HCO3, BE, THGB, TCO2, O2SAT  VBG:    Lab Results   Component Value Date    PHVEN 7.502 06/22/2021       LDH:  No results found for: LDH  Uric Acid:    Lab Results   Component Value Date    LABURIC 10.6 06/22/2021       PT/INR:    Lab Results   Component Value Date    PROTIME 12.0 05/16/2018    INR 1.06 05/16/2018     Warfarin PT/INR:  No components found for: PTPATWAR, PTINRWAR  PTT:  No results found for: APTT, PTT[APTT}  Last 3 Troponin:    Lab Results   Component Value Date    TROPONINI <0.01 06/21/2021    TROPONINI <0.01 12/29/2020    TROPONINI <0.01 05/15/2018       U/A:    Lab Results   Component Value Date    COLORU YELLOW 06/21/2021    PROTEINU Negative 06/21/2021    PHUR 6.0 06/21/2021    PHUR 6.0 06/21/2021    WBCUA 0 06/21/2021    RBCUA 3 06/21/2021    CLARITYU Clear 06/21/2021    SPECGRAV 1.019 06/21/2021    LEUKOCYTESUR Negative 06/21/2021    UROBILINOGEN 1.0 06/21/2021    BILIRUBINUR Negative 06/21/2021    BLOODU Negative 06/21/2021    GLUCOSEU Negative 06/21/2021     Microalbumen/Creatinine ratio:  No components found for: RUCREAT  24 Hour Urine for Protein:  No components found for: RAWUPRO, UHRS3, AZJZ50KW, UTV3  24 Hour Urine for Creatinine Clearance:  No components found for: CREAT4, UHRS10, UTV10  Urine Toxicology:  No components found for: Lynford Rust, IBENZO, ICOCAINE, IMARTHC, IOPIATES, IPHENCYC    HgBA1c:    Lab Results   Component Value Date    LABA1C 5.8 06/25/2015     RPR:  No results found for: RPR  HIV:  No results found for: HIV  CLARK:  No results found for: ANATITER, CLARK  RF:  No results found for: RF  DSDNA:  No components found for: DNA  AMYLASE:  No results found for: AMYLASE  LIPASE:    Lab Results   Component Value Date    LIPASE 52.0 12/29/2020     Fibrinogen Level:  No components found for: FIB       BELOW MENTIONED RADIOLOGY STUDY RESULTS BY ME (AS NEEDED FOR MY EVALUATION AND MANAGEMENT). CT CHEST WO CONTRAST    Result Date: 6/21/2021  EXAMINATION: CT OF THE CHEST WITHOUT CONTRAST 6/21/2021 2:13 pm TECHNIQUE: CT of the chest was performed without the administration of intravenous contrast. Multiplanar reformatted images are provided for review. Dose modulation, iterative reconstruction, and/or weight based adjustment of the mA/kV was utilized to reduce the radiation dose to as low as reasonably achievable.  COMPARISON: Chest radiograph on the same date HISTORY: ORDERING SYSTEM PROVIDED HISTORY: Possible spiculated nodule noted in the left upper lung. Further evaluation with nonemergent chest CT is recommended.

## 2021-06-22 NOTE — PROGRESS NOTES
Pt to CT. PRASHANT ARNDT for testing.  Electronically signed by Maria D Hopkins RN on 6/22/2021 at 10:51 AM

## 2021-06-22 NOTE — PROGRESS NOTES
Comprehensive Nutrition Assessment    Type and Reason for Visit:  Positive Nutrition Screen (malnutrition)    Nutrition Recommendations/Plan:   1. Obtain actual weight, current weight is stated  2. Offer Ensure Enlive BID    Nutrition Assessment:  Pt is nutritionally compromised upon admission as evidenced by pt report of decreased appetite and PO intake over the past 2 weeks d/t nausea, vomiting and diarrhea. Pt reports he was eating normally prior to this. BMI of 14.92 noted, however this is based on a stated weight of 107 lbs. Pt is unsure of his usual body weight. Appears thin but no significant muscle wasting or fat loss is noted. Pt consumed about 25% of breakfast this morning but says he is \"not a breakfast person. \" Agreeable to trial Ensure Enlive BID. Malnutrition Assessment:  Malnutrition Status:  Insufficient data      Nutrition Related Findings:  No edema noted. Wounds:  None       Current Nutrition Therapies:    ADULT DIET;  Regular    Anthropometric Measures:  · Height: 5' 11\" (180.3 cm)  · Current Body Weight: 106 lb 14.8 oz (48.5 kg)   · Ideal Body Weight: 172 lbs; % Ideal Body Weight 62.2 %   · BMI: 14.9  · BMI Categories: Underweight (BMI less than 18.5) (based on stated body weight)       Nutrition Diagnosis:   · Inadequate protein-energy intake related to altered GI function as evidenced by poor intake prior to admission      Nutrition Interventions:   Food and/or Nutrient Delivery:  Continue Current Diet, Start Oral Nutrition Supplement  Nutrition Education/Counseling:  Education not indicated   Coordination of Nutrition Care:  Continue to monitor while inpatient    Goals:  Pt will consume at least 50% of meals and supplements       Nutrition Monitoring and Evaluation:   Behavioral-Environmental Outcomes:  None Identified   Food/Nutrient Intake Outcomes:  Food and Nutrient Intake, Supplement Intake  Physical Signs/Symptoms Outcomes:  Nausea or Vomiting, Diarrhea, Weight     Discharge Planning:     Too soon to determine     Electronically signed by Yane Calderon RD, LD on 6/22/21 at 1:28 PM EDT    Contact: [phone]

## 2021-06-23 ENCOUNTER — APPOINTMENT (OUTPATIENT)
Dept: ULTRASOUND IMAGING | Age: 56
DRG: 682 | End: 2021-06-23
Payer: MEDICARE

## 2021-06-23 LAB
ALBUMIN SERPL-MCNC: 2.2 G/DL (ref 3.4–5)
ANION GAP SERPL CALCULATED.3IONS-SCNC: 4 MMOL/L (ref 3–16)
ANTI-NUCLEAR ANTIBODY (ANA): POSITIVE
BASOPHILS ABSOLUTE: 0 K/UL (ref 0–0.2)
BASOPHILS RELATIVE PERCENT: 0.6 %
BUN BLDV-MCNC: 15 MG/DL (ref 7–20)
CALCIUM IONIZED: 1.12 MMOL/L (ref 1.12–1.32)
CALCIUM SERPL-MCNC: 8.4 MG/DL (ref 8.3–10.6)
CHLORIDE BLD-SCNC: 99 MMOL/L (ref 99–110)
CO2: 31 MMOL/L (ref 21–32)
CREAT SERPL-MCNC: 1 MG/DL (ref 0.9–1.3)
EOSINOPHILS ABSOLUTE: 0.3 K/UL (ref 0–0.6)
EOSINOPHILS RELATIVE PERCENT: 4.8 %
GFR AFRICAN AMERICAN: >60
GFR NON-AFRICAN AMERICAN: >60
GLUCOSE BLD-MCNC: 136 MG/DL (ref 70–99)
HBV SURFACE AB TITR SER: 11.86 MIU/ML
HCT VFR BLD CALC: 21 % (ref 40.5–52.5)
HEMOGLOBIN: 7.2 G/DL (ref 13.5–17.5)
HIV AG/AB: NORMAL
HIV ANTIGEN: NORMAL
HIV-1 ANTIBODY: NORMAL
HIV-2 AB: NORMAL
LYMPHOCYTES ABSOLUTE: 2.6 K/UL (ref 1–5.1)
LYMPHOCYTES RELATIVE PERCENT: 40.7 %
MAGNESIUM: 1.8 MG/DL (ref 1.8–2.4)
MCH RBC QN AUTO: 31.1 PG (ref 26–34)
MCHC RBC AUTO-ENTMCNC: 34.4 G/DL (ref 31–36)
MCV RBC AUTO: 90.3 FL (ref 80–100)
MONOCYTES ABSOLUTE: 0.5 K/UL (ref 0–1.3)
MONOCYTES RELATIVE PERCENT: 8.3 %
NEUTROPHILS ABSOLUTE: 2.9 K/UL (ref 1.7–7.7)
NEUTROPHILS RELATIVE PERCENT: 45.6 %
OCCULT BLOOD DIAGNOSTIC: ABNORMAL
PDW BLD-RTO: 19.6 % (ref 12.4–15.4)
PH VENOUS: 7.54 (ref 7.35–7.45)
PHOSPHORUS: 1.9 MG/DL (ref 2.5–4.9)
PLATELET # BLD: 63 K/UL (ref 135–450)
PMV BLD AUTO: 7.6 FL (ref 5–10.5)
POTASSIUM SERPL-SCNC: 3.4 MMOL/L (ref 3.5–5.1)
PROCALCITONIN: 0.27 NG/ML (ref 0–0.15)
RBC # BLD: 2.33 M/UL (ref 4.2–5.9)
SODIUM BLD-SCNC: 134 MMOL/L (ref 136–145)
TOTAL SYPHILLIS IGG/IGM: NORMAL
WBC # BLD: 6.5 K/UL (ref 4–11)

## 2021-06-23 PROCEDURE — 2500000003 HC RX 250 WO HCPCS: Performed by: INTERNAL MEDICINE

## 2021-06-23 PROCEDURE — 83516 IMMUNOASSAY NONANTIBODY: CPT

## 2021-06-23 PROCEDURE — 1200000000 HC SEMI PRIVATE

## 2021-06-23 PROCEDURE — 99233 SBSQ HOSP IP/OBS HIGH 50: CPT | Performed by: INTERNAL MEDICINE

## 2021-06-23 PROCEDURE — 6370000000 HC RX 637 (ALT 250 FOR IP): Performed by: INTERNAL MEDICINE

## 2021-06-23 PROCEDURE — 6360000002 HC RX W HCPCS: Performed by: INTERNAL MEDICINE

## 2021-06-23 PROCEDURE — 85025 COMPLETE CBC W/AUTO DIFF WBC: CPT

## 2021-06-23 PROCEDURE — 84165 PROTEIN E-PHORESIS SERUM: CPT

## 2021-06-23 PROCEDURE — 6370000000 HC RX 637 (ALT 250 FOR IP): Performed by: HOSPITALIST

## 2021-06-23 PROCEDURE — 2580000003 HC RX 258: Performed by: HOSPITALIST

## 2021-06-23 PROCEDURE — 80069 RENAL FUNCTION PANEL: CPT

## 2021-06-23 PROCEDURE — 83735 ASSAY OF MAGNESIUM: CPT

## 2021-06-23 PROCEDURE — 87328 CRYPTOSPORIDIUM AG IA: CPT

## 2021-06-23 PROCEDURE — C9113 INJ PANTOPRAZOLE SODIUM, VIA: HCPCS | Performed by: INTERNAL MEDICINE

## 2021-06-23 PROCEDURE — 87505 NFCT AGENT DETECTION GI: CPT

## 2021-06-23 PROCEDURE — 84155 ASSAY OF PROTEIN SERUM: CPT

## 2021-06-23 PROCEDURE — 76705 ECHO EXAM OF ABDOMEN: CPT

## 2021-06-23 PROCEDURE — 84145 PROCALCITONIN (PCT): CPT

## 2021-06-23 PROCEDURE — G0328 FECAL BLOOD SCRN IMMUNOASSAY: HCPCS

## 2021-06-23 PROCEDURE — 86708 HEPATITIS A ANTIBODY: CPT

## 2021-06-23 PROCEDURE — 82330 ASSAY OF CALCIUM: CPT

## 2021-06-23 PROCEDURE — 86706 HEP B SURFACE ANTIBODY: CPT

## 2021-06-23 PROCEDURE — 36415 COLL VENOUS BLD VENIPUNCTURE: CPT

## 2021-06-23 PROCEDURE — 87336 ENTAMOEB HIST DISPR AG IA: CPT

## 2021-06-23 RX ORDER — PANTOPRAZOLE SODIUM 40 MG/10ML
40 INJECTION, POWDER, LYOPHILIZED, FOR SOLUTION INTRAVENOUS 2 TIMES DAILY
Status: DISCONTINUED | OUTPATIENT
Start: 2021-06-23 | End: 2021-06-24

## 2021-06-23 RX ORDER — POTASSIUM CHLORIDE 7.45 MG/ML
10 INJECTION INTRAVENOUS PRN
Status: DISCONTINUED | OUTPATIENT
Start: 2021-06-23 | End: 2021-06-25 | Stop reason: HOSPADM

## 2021-06-23 RX ORDER — POTASSIUM CHLORIDE 20 MEQ/1
40 TABLET, EXTENDED RELEASE ORAL PRN
Status: DISCONTINUED | OUTPATIENT
Start: 2021-06-23 | End: 2021-06-25 | Stop reason: HOSPADM

## 2021-06-23 RX ADMIN — LEVETIRACETAM 750 MG: 500 TABLET ORAL at 14:23

## 2021-06-23 RX ADMIN — Medication 10 ML: at 08:35

## 2021-06-23 RX ADMIN — THIAMINE HCL TAB 100 MG 100 MG: 100 TAB at 14:23

## 2021-06-23 RX ADMIN — POTASSIUM PHOSPHATE, MONOBASIC 250 MG: 500 TABLET, SOLUBLE ORAL at 14:24

## 2021-06-23 RX ADMIN — METRONIDAZOLE 500 MG: 500 INJECTION, SOLUTION INTRAVENOUS at 08:24

## 2021-06-23 RX ADMIN — METRONIDAZOLE 500 MG: 500 INJECTION, SOLUTION INTRAVENOUS at 16:34

## 2021-06-23 RX ADMIN — RISPERIDONE 1 MG: 1 TABLET ORAL at 20:33

## 2021-06-23 RX ADMIN — CIPROFLOXACIN 400 MG: 2 INJECTION, SOLUTION INTRAVENOUS at 05:08

## 2021-06-23 RX ADMIN — METRONIDAZOLE 500 MG: 500 INJECTION, SOLUTION INTRAVENOUS at 02:04

## 2021-06-23 RX ADMIN — FOLIC ACID 1 MG: 1 TABLET ORAL at 14:23

## 2021-06-23 RX ADMIN — MAGNESIUM GLUCONATE 500 MG ORAL TABLET 400 MG: 500 TABLET ORAL at 14:24

## 2021-06-23 RX ADMIN — CIPROFLOXACIN 400 MG: 2 INJECTION, SOLUTION INTRAVENOUS at 17:41

## 2021-06-23 RX ADMIN — POTASSIUM PHOSPHATE, MONOBASIC 250 MG: 500 TABLET, SOLUBLE ORAL at 16:30

## 2021-06-23 RX ADMIN — Medication 10 ML: at 20:33

## 2021-06-23 RX ADMIN — PANTOPRAZOLE SODIUM 40 MG: 40 INJECTION, POWDER, FOR SOLUTION INTRAVENOUS at 20:32

## 2021-06-23 RX ADMIN — MAGNESIUM GLUCONATE 500 MG ORAL TABLET 400 MG: 500 TABLET ORAL at 20:33

## 2021-06-23 RX ADMIN — PANTOPRAZOLE SODIUM 40 MG: 40 INJECTION, POWDER, FOR SOLUTION INTRAVENOUS at 16:30

## 2021-06-23 RX ADMIN — LEVETIRACETAM 750 MG: 500 TABLET ORAL at 20:33

## 2021-06-23 ASSESSMENT — ENCOUNTER SYMPTOMS
COUGH: 0
RHINORRHEA: 0
EYE REDNESS: 0
DIARRHEA: 0
TROUBLE SWALLOWING: 0
CONSTIPATION: 0
ABDOMINAL PAIN: 0
SHORTNESS OF BREATH: 0
BACK PAIN: 0
SORE THROAT: 0
WHEEZING: 0
EYE DISCHARGE: 0
NAUSEA: 0

## 2021-06-23 NOTE — PROGRESS NOTES
MD Madhav Tse MD Veneta Crape, MD               Office: [phone]                      Fax: [phone]             [facility]                   NEPHROLOGY INPATIENT PROGRESS NOTE:     PATIENT NAME: Yolanda Lopez  : 1965  MRN: [id number]       RECOMMENDATIONS:   -ok to stop LR  -add K-Phos  - Mag better w/ repletion, s/p 1 g IV, keep PO   - HSV management per 1' team     D/C plan from renal stand point: Stable     D/W patient, hospitalist,       IMPRESSION:       Admitted for:  CATALINO (acute kidney injury) ([facility] [address] [N17.9]      CAATLINO (on no CKD:): better  - BL Scr- 0.7-0.8 (till 2021 ---> 2.1 on admission  -: Etiology of CATALINO - pre-renal + Early ATN w/ hypovolemia    - other differentials: r/o obstruction,   - UA - bland, clear, so unlikely GN / TI / TMA process  - urine lytes = hypovolemia       Associated problems:   - Volume status: mild hypo-volemic  : BP: lower -  No need for tight control  : Na: WNL    - Azotemia: severe 71 : ?pre-renal, no steroids, ? UGIB w/ lower Hb    - Electrolytes: K: hypokalemia - mild   HypoMagnesemia - mild     - Lactic acidosis -?hypoperfusion+ CATALINO + starvation - alcohol ketosis    - Anemia: mild - follow        Other major problems: Management per primary and other consulting teams. U tox (+) for Cannabinoids     Hospital Problems         Last Modified POA    Screening for HIV (human immunodeficiency virus) 2021 Yes    CATALINO (acute kidney injury) ([facility] [address] 2021 Yes    Enterocolitis 2021 Yes             Please refer to the orders. High Complexity. Multiple complex problems. Discussed with patient and treatment team-    Time spent > 30 (~35) minutes. Thank you for allowing me to participate in this patient's care. Please do not hesitate to contact me anytime. We will follow along with you.        Surendra Joy MD,  [facility] [address] [place] [address]  Office: [phone] or [address]  Fax: [phone]        ========================================================   ========================================================   SUBJECTIVE:  Patient was seen comfortably sitting up in the bed again today    Reported no active complaints,   Renal function better   On IVF  BP lower but stable      Past medical, Surgical, Social, Family medical history reviewed by me. MEDICATIONS: reviewed by me. Medications Prior to Admission:  No current facility-administered medications on file prior to encounter.      Current Outpatient Medications on File Prior to Encounter   Medication Sig Dispense Refill    phenytoin (DILANTIN) 100 MG ER capsule Take 250 mg by mouth 2 times daily      levETIRAcetam (KEPPRA) 750 MG tablet Take 1 tablet by mouth 2 times daily 60 tablet 3    risperiDONE (RISPERDAL) 1 MG tablet Take 1 mg by mouth nightly      folic acid (FOLVITE) 1 MG tablet Take 1 tablet by mouth daily 30 tablet 3    Multiple Vitamin (MULTIVITAMIN) tablet Take 1 tablet by mouth daily 30 tablet 3    vitamin B-1 100 MG tablet Take 1 tablet by mouth daily 30 tablet 3    magnesium oxide (MAG-OX) 400 (240 Mg) MG tablet Take 1 tablet by mouth 2 times daily 30 tablet 1    potassium chloride (KLOR-CON M) 20 MEQ extended release tablet Take 2 tablets by mouth 2 times daily (with meals) 60 tablet 0         Current Facility-Administered Medications:     potassium chloride (KLOR-CON M) extended release tablet 40 mEq, 40 mEq, Oral, PRN **OR** potassium bicarb-citric acid (EFFER-K) effervescent tablet 40 mEq, 40 mEq, Oral, PRN **OR** potassium chloride 10 mEq/100 mL IVPB (Peripheral Line), 10 mEq, Intravenous, PRN, Felicitas Cueva MD    magnesium sulfate 1000 mg in dextrose 5% 100 mL IVPB, 1,000 mg, Intravenous, PRN, Felicitas Cueva MD    ciprofloxacin (CIPRO) IVPB 400 mg, 400 mg, Intravenous, Q12H, Jesus Cespedes MD, Stopped at 06/23/21 0608    metronidazole (FLAGYL) 500 mg in NaCl 100 mL IVPB premix, 500 mg, Intravenous, Q8H, Prince Bryan MD, Last Rate: 100 mL/hr at 06/23/21 0824, 500 mg at 16/56/93 9856    folic acid (FOLVITE) tablet 1 mg, 1 mg, Oral, Daily, Pratik Gutierrez MD, 1 mg at 06/22/21 0754    levETIRAcetam (KEPPRA) tablet 750 mg, 750 mg, Oral, BID, Toni Hairston MD, 750 mg at 06/22/21 2008    magnesium oxide (MAG-OX) tablet 400 mg, 400 mg, Oral, BID, Toni Hairston MD, 400 mg at 06/22/21 2011    risperiDONE (RISPERDAL) tablet 1 mg, 1 mg, Oral, Nightly, Toni Hairston MD, 1 mg at 06/22/21 2008    thiamine mononitrate tablet 100 mg, 100 mg, Oral, Daily, Pratik Gutierrez MD, 100 mg at 06/22/21 0754    sodium chloride flush 0.9 % injection 5-40 mL, 5-40 mL, Intravenous, 2 times per day, Pratik Gutierrez MD, 10 mL at 06/23/21 0835    sodium chloride flush 0.9 % injection 5-40 mL, 5-40 mL, Intravenous, PRN, Pratik Gutierrez MD    0.9 % sodium chloride infusion, 25 mL, Intravenous, PRN, Pratik Gutierrez MD    ondansetron (ZOFRAN-ODT) disintegrating tablet 4 mg, 4 mg, Oral, Q8H PRN **OR** ondansetron (ZOFRAN) injection 4 mg, 4 mg, Intravenous, Q6H PRN, Pratik Gutierrez MD    polyethylene glycol (GLYCOLAX) packet 17 g, 17 g, Oral, Daily PRN, Pratik Gutierrez MD    acetaminophen (TYLENOL) tablet 650 mg, 650 mg, Oral, Q6H PRN, 650 mg at 06/22/21 2008 **OR** acetaminophen (TYLENOL) suppository 650 mg, 650 mg, Rectal, Q6H PRN, Pratik Gutierrez MD    lactated ringers infusion, , Intravenous, Continuous, Atilio Radford MD, Last Rate: 75 mL/hr at 06/22/21 1332, Rate Verify at 06/22/21 1332    REVIEW OF SYSTEMS:  As mentioned in chief complaint and HPI , Subjective    PHYSICAL EXAM:  Recent vital signs and recent I/Os reviewed by me.      Wt Readings from Last 3 Encounters:   06/21/21 107 lb (48.5 kg)   01/04/21 116 lb 2.9 oz (52.7 kg)   12/27/19 150 lb (68 kg)     BP Readings from Last 3 Encounters:   06/23/21 (!) 103/43   01/05/21 109/71   12/27/19 (!) 132/48     Patient Vitals for the past 24 hrs:   BP Temp Temp src Pulse Resp SpO2 Height   06/23/21 0824 (!) 103/43 98 °F (36.7 °C) Oral 84 16 95 % --   06/23/21 0509 (!) 107/51 97.8 °F (36.6 °C) Oral 82 16 95 % --   06/22/21 2350 106/62 98 °F (36.7 °C) Oral 99 16 97 % --   06/22/21 2005 (!) 116/57 98.2 °F (36.8 °C) Oral 91 16 98 % --   06/22/21 1648 (!) 114/56 98 °F (36.7 °C) Oral 82 16 95 % --   06/22/21 1331 -- -- -- -- -- -- 5' 11\" (1.803 m)   06/22/21 1206 (!) 94/56 97.2 °F (36.2 °C) Oral 93 16 99 % --       Intake/Output Summary (Last 24 hours) at 6/23/2021 1006  Last data filed at 6/23/2021 0837  Gross per 24 hour   Intake 4371.55 ml   Output 1290 ml   Net 3081.55 ml         Physical Exam  Vitals reviewed. Constitutional:       General: He is not in acute distress. Appearance: He is underweight. He is ill-appearing. HENT:      Head: Normocephalic and atraumatic. Right Ear: External ear normal.      Left Ear: External ear normal.      Nose: Nose normal.      Mouth/Throat:      Mouth: Mucous membranes are not dry. Eyes:      General: No scleral icterus. Conjunctiva/sclera: Conjunctivae normal.   Neck:      Vascular: No JVD. Cardiovascular:      Rate and Rhythm: Normal rate and regular rhythm. Heart sounds: S1 normal and S2 normal.   Pulmonary:      Effort: Pulmonary effort is normal. No respiratory distress. Breath sounds: Normal breath sounds. Abdominal:      General: Bowel sounds are normal.      Palpations: Abdomen is soft. Musculoskeletal:         General: No swelling or deformity. Cervical back: Normal range of motion and neck supple. Skin:     General: Skin is warm and dry. Neurological:      Mental Status: He is alert and oriented to person, place, and time. Mental status is at baseline. Psychiatric:         Mood and Affect: Mood normal.         Behavior: Behavior normal.         DATA:  Diagnostic tests reviewed by me for today's visit:   (AS NEEDED FOR MY EVALUATION AND MANAGEMENT).        Recent Labs     06/21/21  1152 06/22/21  0504 06/22/21  0956 06/23/21  0623   WBC 10.0 7.6  --  6.5   HCT 30.8* 24.0* 22.7* 21.0*   PLT 92* 64*  --  63*     Iron Saturation:  No components found for: PERCENTFE  FERRITIN:    Lab Results   Component Value Date    FERRITIN 491.7 06/22/2021     IRON:    Lab Results   Component Value Date    IRON 49 06/22/2021     TIBC:    Lab Results   Component Value Date    TIBC 172 06/22/2021       Recent Labs     06/21/21  1152 06/22/21  0504 06/23/21  0623    138 134*   K 3.4* 3.6 3.4*   CL 92* 101 99   CO2 32 27 31   BUN 71* 42* 15   CREATININE 2.1* 1.3 1.0     Recent Labs     06/21/21  1152 06/22/21  0503 06/22/21  0504 06/23/21  0623   CALCIUM 9.1  --  7.8* 8.4   MG 1.60*  --  1.70* 1.80   PHOS  --  3.1 2.9 1.9*     No results for input(s): PH, PCO2, PO2 in the last 72 hours.     Invalid input(s): Xiao Lux    ABG:  No results found for: PH, PCO2, PO2, HCO3, BE, THGB, TCO2, O2SAT  VBG:    Lab Results   Component Value Date    PHVEN 7.539 06/23/2021       LDH:  No results found for: LDH  Uric Acid:    Lab Results   Component Value Date    LABURIC 10.6 06/22/2021       PT/INR:    Lab Results   Component Value Date    PROTIME 12.0 05/16/2018    INR 1.06 05/16/2018     Warfarin PT/INR:  No components found for: PTPATWAR, PTINRWAR  PTT:  No results found for: APTT, PTT[APTT}  Last 3 Troponin:    Lab Results   Component Value Date    TROPONINI <0.01 06/21/2021    TROPONINI <0.01 12/29/2020    TROPONINI <0.01 05/15/2018       U/A:    Lab Results   Component Value Date    COLORU YELLOW 06/21/2021    PROTEINU Negative 06/21/2021    PHUR 6.0 06/21/2021    PHUR 6.0 06/21/2021    WBCUA 0 06/21/2021    RBCUA 3 06/21/2021    CLARITYU Clear 06/21/2021    SPECGRAV 1.019 06/21/2021    LEUKOCYTESUR Negative 06/21/2021    UROBILINOGEN 1.0 06/21/2021    BILIRUBINUR Negative 06/21/2021    BLOODU Negative 06/21/2021    GLUCOSEU Negative 06/21/2021     Microalbumen/Creatinine ratio:  No components found for: RUCREAT  24 Hour Urine for Protein:  No components found for: RAWUPRO, UHRS3, MSRS29ES, UTV3  24 Hour Urine for Creatinine Clearance:  No components found for: CREAT4, UHRS10, UTV10  Urine Toxicology:  No components found for: Tamsen Pickle, IBENZO, ICOCAINE, IMARTHC, IOPIATES, IPHENCYC    HgBA1c:    Lab Results   Component Value Date    LABA1C 5.8 06/25/2015     RPR:  No results found for: RPR  HIV:  No results found for: HIV  CLARK:  No results found for: ANATITER, CLARK  RF:  No results found for: RF  DSDNA:  No components found for: DNA  AMYLASE:  No results found for: AMYLASE  LIPASE:    Lab Results   Component Value Date    LIPASE 52.0 12/29/2020     Fibrinogen Level:  No components found for: FIB       BELOW MENTIONED RADIOLOGY STUDY RESULTS BY ME (AS NEEDED FOR MY EVALUATION AND MANAGEMENT). CT CHEST WO CONTRAST    Result Date: 6/21/2021  EXAMINATION: CT OF THE CHEST WITHOUT CONTRAST 6/21/2021 2:13 pm TECHNIQUE: CT of the chest was performed without the administration of intravenous contrast. Multiplanar reformatted images are provided for review. Dose modulation, iterative reconstruction, and/or weight based adjustment of the mA/kV was utilized to reduce the radiation dose to as low as reasonably achievable. COMPARISON: Chest radiograph on the same date HISTORY: ORDERING SYSTEM PROVIDED HISTORY: pneumonia TECHNOLOGIST PROVIDED HISTORY: Reason for exam:->pneumonia Decision Support Exception - unselect if not a suspected or confirmed emergency medical condition->Emergency Medical Condition (MA) Reason for Exam: pneumonia Acuity: Unknown Type of Exam: Unknown FINDINGS: Mediastinum: Heart size is normal. Aorta and pulmonary arteries are normal. Superior mediastinum is normal. No lymph node enlargement within the chest. The esophagus tapers normally. Lungs/pleura: The airways are patent. Bronchiectasis in the periphery of the lungs bilaterally.   Moderate centrilobular emphysema with areas of honeycombing

## 2021-06-23 NOTE — PROGRESS NOTES
[facility]   Progress Note      6/23/2021 8:30 AM        Name: Guru Crawford . Admitted: 6/21/2021    SUBJECTIVE:  He is doing ok, offers no complaints, no external bleeding. Reviewed interval ancillary notes    Current Medications  magnesium sulfate 1000 mg in dextrose 5% 100 mL IVPB, PRN  ciprofloxacin (CIPRO) IVPB 400 mg, Q12H  metronidazole (FLAGYL) 500 mg in NaCl 100 mL IVPB premix, U5D  folic acid (FOLVITE) tablet 1 mg, Daily  levETIRAcetam (KEPPRA) tablet 750 mg, BID  magnesium oxide (MAG-OX) tablet 400 mg, BID  risperiDONE (RISPERDAL) tablet 1 mg, Nightly  thiamine mononitrate tablet 100 mg, Daily  sodium chloride flush 0.9 % injection 5-40 mL, 2 times per day  sodium chloride flush 0.9 % injection 5-40 mL, PRN  0.9 % sodium chloride infusion, PRN  ondansetron (ZOFRAN-ODT) disintegrating tablet 4 mg, Q8H PRN   Or  ondansetron (ZOFRAN) injection 4 mg, Q6H PRN  polyethylene glycol (GLYCOLAX) packet 17 g, Daily PRN  acetaminophen (TYLENOL) tablet 650 mg, Q6H PRN   Or  acetaminophen (TYLENOL) suppository 650 mg, Q6H PRN  lactated ringers infusion, Continuous        Objective:  BP (!) 103/43   Pulse 84   Temp 98 °F (36.7 °C) (Oral)   Resp 16   Ht 5' 11\" (1.803 m)   Wt 107 lb (48.5 kg)   SpO2 95%   BMI 14.92 kg/m²     Intake/Output Summary (Last 24 hours) at 6/23/2021 0830  Last data filed at 6/22/2021 1913  Gross per 24 hour   Intake 4371.55 ml   Output 850 ml   Net 3521.55 ml      Wt Readings from Last 3 Encounters:   06/21/21 107 lb (48.5 kg)   01/04/21 116 lb 2.9 oz (52.7 kg)   12/27/19 150 lb (68 kg)       General appearance:  Appears comfortable  Eyes: Sclera clear. Pupils equal.  ENT: Moist oral mucosa. Trachea midline, no adenopathy. Cardiovascular: Regular rhythm, normal S1, S2. No murmur. No edema in lower extremities  Respiratory: Not using accessory muscles. Good inspiratory effort. Clear to auscultation bilaterally, no wheeze or crackles.    GI: Abdomen soft, no tenderness, not distended  Musculoskeletal: No cyanosis in digits, neck supple  Neurology: CN 2-12 grossly intact. No speech or motor deficits  Psych: Normal affect. Alert and oriented in time, place and person  Skin: Warm, dry, normal turgor    Labs and Tests:  CBC:   Recent Labs     06/21/21  1152 06/22/21  0504 06/23/21  0623   WBC 10.0 7.6 6.5   HGB 10.4* 8.1* 7.2*   PLT 92* 64* 63*     BMP:    Recent Labs     06/21/21  1152 06/22/21  0504 06/23/21  0623    138 134*   K 3.4* 3.6 3.4*   CL 92* 101 99   CO2 32 27 31   BUN 71* 42* 15   CREATININE 2.1* 1.3 1.0   GLUCOSE 151* 103* 136*     Hepatic:   Recent Labs     06/21/21  1152   AST 56*   ALT 30   BILITOT 1.5*   ALKPHOS 113       ASSESSMENT AND PLAN    Active Problems:    Screening for HIV (human immunodeficiency virus)    CATALINO (acute kidney injury) ([facility] [address]    Enterocolitis  Resolved Problems:    * No resolved hospital problems. *      Anemia and thrombocytopenia  - normochromic/normocytic anemia  - no iron, B12/folate deficiency  - haptoglobin - 55   - CLARK - pending  - CT abd/pelvis - showed cirrhosis, possibly acute cholecystitis  - US gallbladder pending     Possible sepsis  - ID following  - on antibiotics  - blood cultures pending       [facility]  [facility]    The patient was seen and examined. Agree with above. Continue current care. There is no evidence of hemolysis. Monitor platelets.     Dena Scott MD

## 2021-06-23 NOTE — PROGRESS NOTES
Infectious Diseases   Progress Note      Admission Date: 6/21/2021  Hospital Day: Hospital Day: 3   Attending: Dora Marquis MD  Date of service: 6/23/2021     Chief complaint/ Reason for consult:     · Penile rash - *scabies versus bacterial infection  · Hypokalemia  · Hypomagnesemia  · Acute kidney injury with elevated serum creatinine of 2.1  · Transaminitis with elevated AST of 56   · Elevated total bilirubin of 1.5  · History of streptococcal bacteremia in January 2021    Microbiology:        I have reviewed allavailable micro lab data and cultures    · Blood culture (2/2) - collected on 6/21/2021: In process        Antibiotics and immunizations:       Current antibiotics: All antibiotics and their doses were reviewed by me    Recent Abx Admin                   metronidazole (FLAGYL) 500 mg in NaCl 100 mL IVPB premix (mg) 500 mg New Bag 06/23/21 0824     500 mg New Bag  0204     500 mg New Bag 06/22/21 1650    ciprofloxacin (CIPRO) IVPB 400 mg (mg) 400 mg New Bag 06/23/21 0508     400 mg New Bag 06/22/21 1804                  Immunization History: All immunization history was reviewed by me today. There is no immunization history for the selected administration types on file for this patient. Known drug allergies: All allergies were reviewed and updated    Allergies   Allergen Reactions    Carbamazepine Other (See Comments)     Thrombocytopenia    Codeine Itching     Per ER    Phenytoin Itching    Sulfa Antibiotics      Per ER       Social history:     Social History:  All social andepidemiologic history was reviewed and updated by me today as needed. · Tobacco use:   reports that he has been smoking. He has a 40.00 pack-year smoking history. He has never used smokeless tobacco.  · Alcohol use:   reports current alcohol use of about 1.0 standard drinks of alcohol per week. · Currently lives in: [place] [address]  ·  reports current drug use. Drug: Marijuana.      COVID VACCINATION AND LAB RESULT RECORDS:     Internal Administration   First Dose      Second Dose           Last COVID Lab SARS-CoV-2, NAAT (no units)   Date Value   12/30/2020 Not Detected            Assessment:     The patient is a 60 y.o. old male who  has a past medical history of Alcohol abuse, Cancer ([facility] [address] and Seizures ([facility] [address] with following problems:    · Penile rash - *scabies versus bacterial infection-s/p ivermectin on 6/21/2021  · Hypokalemia-being corrected, serum potassium is 3.4 today  · Hypomagnesemia-corrected  · Acute kidney injury-resolved  · Transaminitis with elevated AST of 56- follow-up on liver enzymes  · Elevated uric acid  · Elevated total bilirubin of 1.5  · History of streptococcal bacteremia in January 2021  · Seizure disorder  · History of marijuana abuse-counseling done  · Chronic small vessel ischemia  · Smoker-counseling done  · Alcohol abuse-counseling done  · Emaciation: Body mass index is 14.37 kg/m². Discussion:      The patient is on empiric IV ciprofloxacin and Flagyl due to concerns for enterocolitis noted on the CT scan. Serum creatinine is 1.0 today. Procalcitonin is 0.27    White cell count is 6500. HIV screen was negative. Syphilis screen was negative. Has positive [facility] screen    Plan:     Diagnostic Workup:    · Follow-up on right upper quadrant ultrasound  · Continue to follow  fever curve, WBC count and blood cultures. · Continue to monitor blood counts, liver and renal function. Antimicrobials:    · Will continue IV Cipro 400 mg every 12 hour  · Continue IV Flagyl 500 mg every 8 hours  · Nephrology and hematology notes reviewed  · Ivermectin dose will need to be repeated after 1 week of the first dose  · We will follow up on the culture results and clinical progress and will make further recommendations accordingly. · Continue close vitals monitoring. · Maintain good glycemic control. · Fall precautions. Aspiration precautions.   · Continue to watch for new fever or diarrhea. · DVT prophylaxis. · Discussed all above with patient and RN. Drug Monitoring:    · Continue monitoring for antibiotic toxicity as follows: CBC, CMP   · Continue to watch for following: new or worsening fever, new hypotension, hives, lip swelling and redness or purulence at vascular access sites. I/v access Management:    · Continue to monitor i.v access sites for erythema, induration, discharge or tenderness. · As always, continue efforts to minimize tubes/lines/drains as clinically appropriate to reduce chances of line associated infections. Patient education and counseling:        · The patient was educated in detail about the side-effects of various antibiotics and things to watch for like new rashes, lip swelling, severe reaction, worsening diarrhea, break through fever etc.  · Discussed patient's condition and what to expect. All of the patient's questions were addressed in a satisfactory manner and patient verbalized understanding all instructions. Alcohol use disorder counseling:     Patient was counseled about harms of drinking too much alcohol. CDC data shows that in addition to liver problems and other health issues, heavy alcohol use is dangerous and can increased the likelihood of heart disease, breast cancer, sexually transmitted diseases, unintended pregnancy, fetal alcohol spectrum disorders, motor-vehicle crashes, and violence. Patient was advised to cut down on alcohol use and was advised to see a counselor. Information was given about Alcoholics anonymous (www.aa.org) and [facility] addiction services help-line 9-776.741.4372. TIME SPENT TODAY:     - Spent over  36 minutes on visit (including interval history, physical exam, review of data including labs, cultures, imaging, development and implementation of treatment plan and coordination of complex care).  More than 50 percent of this includes face-to-face time spent with the patient for counseling and coordination of care. Thank you for involving me in the care of your patient. I will continue to follow. If you have anyadditional questions, please do not hesitate to contact me. Subjective: Interval history: Interval history was obtained from chart review and patient/ RN. He is afebrile. Tolerating antibiotics okay. No diarrhea     REVIEW OF SYSTEMS:      Review of Systems   Constitutional: Positive for fatigue. Negative for chills, diaphoresis and fever. HENT: Negative for ear discharge, ear pain, rhinorrhea, sore throat and trouble swallowing. Eyes: Negative for discharge and redness. Respiratory: Negative for cough, shortness of breath and wheezing. Cardiovascular: Negative for chest pain and leg swelling. Gastrointestinal: Negative for abdominal pain, constipation, diarrhea and nausea. Endocrine: Negative for polyuria. Genitourinary: Negative for dysuria, flank pain, frequency, hematuria and urgency. Musculoskeletal: Negative for back pain and myalgias. Skin: Positive for rash. Ongoing penile rash. Neurological: Negative for dizziness, seizures and headaches. Hematological: Does not bruise/bleed easily. Psychiatric/Behavioral: Negative for hallucinations and suicidal ideas. All other systems reviewed and are negative. Past Medical History: All past medical history reviewed today. Past Medical History:   Diagnosis Date    Alcohol abuse     Cancer ([facility] [address]     Seizures ([facility] [address]        Past Surgical History: All past surgical history was reviewed today. History reviewed. No pertinent surgical history. Family History: All family history was reviewed today. History reviewed. No pertinent family history.     Objective:       PHYSICAL EXAM:      Vitals:   Vitals:    06/23/21 0509 06/23/21 0824 06/23/21 1144 06/23/21 1148   BP: (!) 107/51 (!) 103/43 (!) 175/148 (!) 113/56   Pulse: 82 84 128 80   Resp: 16 16 16    Temp: 97.8 °F (36.6 °C) 98 °F (36.7 °C) 98.5 °F (36.9 °C)    TempSrc: Oral Oral Oral    SpO2: 95% 95% 97%    Weight:       Height:           Physical Exam  Vitals and nursing note reviewed. Constitutional:       Appearance: Normal appearance. He is well-developed. HENT:      Head: Normocephalic and atraumatic. Right Ear: External ear normal.      Left Ear: External ear normal.      Nose: Nose normal. No congestion or rhinorrhea. Mouth/Throat:      Mouth: Mucous membranes are moist.      Pharynx: No oropharyngeal exudate or posterior oropharyngeal erythema. Eyes:      General: No scleral icterus. Right eye: No discharge. Left eye: No discharge. Conjunctiva/sclera: Conjunctivae normal.      Pupils: Pupils are equal, round, and reactive to light. Cardiovascular:      Rate and Rhythm: Normal rate and regular rhythm. Pulses: Normal pulses. Heart sounds: No murmur heard. No friction rub. Pulmonary:      Effort: Pulmonary effort is normal. No respiratory distress. Breath sounds: Normal breath sounds. No stridor. No wheezing, rhonchi or rales. Abdominal:      General: Bowel sounds are normal.      Palpations: Abdomen is soft. Tenderness: There is no abdominal tenderness. There is no right CVA tenderness, left CVA tenderness, guarding or rebound. Musculoskeletal:         General: No swelling or tenderness. Normal range of motion. Cervical back: Normal range of motion and neck supple. No rigidity. No muscular tenderness. Lymphadenopathy:      Cervical: No cervical adenopathy. Skin:     General: Skin is warm and dry. Coloration: Skin is not jaundiced. Findings: Rash present. No erythema. Comments: Penile rash and itching improving. Neurological:      General: No focal deficit present. Mental Status: He is alert and oriented to person, place, and time. Mental status is at baseline. Motor: No abnormal muscle tone.    Psychiatric:         Mood and Affect: Mood normal. Behavior: Behavior normal.         Thought Content: Thought content normal.             Lines: All vascular access sites are healthy with no local erythema, discharge or tenderness. Intake and output:    I/O last 3 completed shifts: In: 4611.6 [P.O.:840; I.V.:1720.9; IV Piggyback:2050.7]  Out: 1250 [Urine:1250]    Lab Data:   All available labs and old records have been reviewed by me. CBC:  Recent Labs     06/21/21  1152 06/22/21  0504 06/22/21  0956 06/23/21  0623   WBC 10.0 7.6  --  6.5   RBC 3.45* 2.65*  --  2.33*   HGB 10.4* 8.1*  --  7.2*   HCT 30.8* 24.0* 22.7* 21.0*   PLT 92* 64*  --  63*   MCV 89.5 90.4  --  90.3   MCH 30.1 30.5  --  31.1   MCHC 33.7 33.7  --  34.4   RDW 20.4* 19.9*  --  19.6*        BMP:  Recent Labs     06/21/21  1152 06/22/21  0504 06/23/21  0623    138 134*   K 3.4* 3.6 3.4*   CL 92* 101 99   CO2 32 27 31   BUN 71* 42* 15   CREATININE 2.1* 1.3 1.0   CALCIUM 9.1 7.8* 8.4   GLUCOSE 151* 103* 136*        Hepatic Function Panel:   Lab Results   Component Value Date    ALKPHOS 113 06/21/2021    ALT 30 06/21/2021    AST 56 06/21/2021    PROT 8.5 06/21/2021    BILITOT 1.5 06/21/2021    BILIDIR 0.4 01/01/2021    IBILI 0.4 01/01/2021    LABALBU 2.2 06/23/2021       CPK:   Lab Results   Component Value Date    CKTOTAL 118 12/29/2020     ESR: No results found for: SEDRATE  CRP: No results found for: CRP        Imaging: All pertinent images and reports for the current visit were reviewed by me during this visit. CT ABDOMEN PELVIS W IV CONTRAST Additional Contrast? None   Final Result   Mild wall thickening involving the small bowel and ascending colon with mild   adjacent inflammatory changes. Findings concerning for enterocolitis. Gallbladder wall is hyperenhancing and thickened with mild adjacent   inflammatory stranding. While the gallbladder wall thickening may be related   to the patient's liver disease, acute cholecystitis is not excluded.    Clinical correlation recommended. If indicated, right upper quadrant   ultrasound may be helpful for further evaluation. Cirrhosis with trace ascites. CT CHEST WO CONTRAST   Final Result   1. No acute airspace abnormality. No evidence of underlying pneumonia. 2. Moderate centrilobular emphysema and chronic interstitial lung disease   with overall pattern favoring UIP. XR CHEST PORTABLE   Final Result   Subtle patchy hazy opacities noted in the bilateral upper lungs which may   reflect airspace disease in the correct clinical setting. Possible spiculated nodule noted in the left upper lung. Further evaluation   with nonemergent chest CT is recommended. US GALLBLADDER RUQ    (Results Pending)       Medications: All current and past medications were reviewed.      potassium phosphate (monobasic)  250 mg Oral TID WC    ciprofloxacin  400 mg Intravenous Q12H    metroNIDAZOLE  500 mg Intravenous W1T    folic acid  1 mg Oral Daily    levETIRAcetam  750 mg Oral BID    magnesium oxide  400 mg Oral BID    risperiDONE  1 mg Oral Nightly    thiamine mononitrate  100 mg Oral Daily    sodium chloride flush  5-40 mL Intravenous 2 times per day        sodium chloride         potassium chloride **OR** potassium alternative oral replacement **OR** potassium chloride, magnesium sulfate, sodium chloride flush, sodium chloride, ondansetron **OR** ondansetron, polyethylene glycol, acetaminophen **OR** acetaminophen      Problem list:       Patient Active Problem List   Diagnosis Code    Partial symptomatic epilepsy with complex partial seizures, intractable, without status epilepticus ([address] G40.219    Noncompliance with medication regimen Z91.14    Seizures ([address] R56.9    Alcohol abuse F10.10    Pneumonia J18.9    Nausea and vomiting R11.2    Breakthrough seizure ([address] G40.919    Acute febrile illness R50.9    Sepsis ([address] A41.9    Lactic acid acidosis E87.2    Smoker F17.200    Cerebrovascular small vessel disease I67.9    Streptococcal bacteremia R78.81, B95.5    Streptococcus viridans infection A49.1    Screening for HIV (human immunodeficiency virus) Z11.4    Tobacco abuse counseling Z71.6    CATALINO (acute kidney injury) ([facility] [address] N17.9    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 Although every effort was made to ensure the accuracy of this automated transcription, some errors in transcription may have occurred inadvertently. If you may need any clarification, please do not hesitate to contact me through EPIC or at the phone number provided below with my electronic signature. Any pictures or media included in this note were obtained after taking informed verbal consent from the patient and with their approval to include those in the patient's medical record.     Eustaquio Kayser, MD, MPH  6/23/2021 , 1:42 PM   [facility] Infectious Disease   [address] [place] [address] [address] [facility], [address] [place] [address]  Office: [phone]  Fax: [phone]  Clinic days:  Tuesday & Thursday

## 2021-06-23 NOTE — CONSULTS
Gastroenterology Consult Note        Patient: Guru Crawford  : 1965  Acct#:      Date:  2021    Subjective:       History of Present Illness  Patient is a 60 y.o.  male admitted with CATALINO (acute kidney injury) ([facility] [address] [N17.9] who is seen in consult for cholecystitis. History of seizure disorder. Patient reports history of some sort of cancer but does not know what type. Per chart, history of alcohol abuse. Patient states that he drinks one 16 ounce beer every other day and has not drank heavier than this in the past.  There have been several ED visits in the past for alcohol intoxication. Patient came to the hospital 2 days ago with nausea, vomiting, nonbloody diarrhea, near syncope, and penile rash. Rash felt to be possibly from scabies. Underwent CT abdomen pelvis yesterday that showed cirrhosis calcifications in the pancreas, thickening of the cecum and surrounding small bowel, small volume ascites, edema, postsurgical changes of the rectosigmoid, and gallbladder wall thickening. Patient reports epigastric pain that started a couple days prior to admission. Was sharp and cramping. Would be intermittent. Not associated with eating. The nausea and vomiting has since resolved. Patient thinks he had diarrhea here initially but no stool has been collected for stool studies. No prior diagnosis of cirrhosis. When asked about postsurgical changes of the rectosigmoid per CT patient states that he had a cancer and had surgery. But he does not know if it was colon cancer or what. Past Medical History:   Diagnosis Date    Alcohol abuse     Cancer ([facility] [address]     Seizures ([facility] [address]       History reviewed. No pertinent surgical history. Past Endoscopic History  Colonoscopy 2019 with Dr. Александр Bowling for screening. Ascending colon diverticulosis, 5 mm sigmoid polyp, small grade 1 internal hemorrhoids. Path consistent with tubular adenoma.   Follow-up was recommended in 5 years. Admission Meds  No current facility-administered medications on file prior to encounter. Current Outpatient Medications on File Prior to Encounter   Medication Sig Dispense Refill    phenytoin (DILANTIN) 100 MG ER capsule Take 250 mg by mouth 2 times daily      levETIRAcetam (KEPPRA) 750 MG tablet Take 1 tablet by mouth 2 times daily 60 tablet 3    risperiDONE (RISPERDAL) 1 MG tablet Take 1 mg by mouth nightly      folic acid (FOLVITE) 1 MG tablet Take 1 tablet by mouth daily 30 tablet 3    Multiple Vitamin (MULTIVITAMIN) tablet Take 1 tablet by mouth daily 30 tablet 3    vitamin B-1 100 MG tablet Take 1 tablet by mouth daily 30 tablet 3    magnesium oxide (MAG-OX) 400 (240 Mg) MG tablet Take 1 tablet by mouth 2 times daily 30 tablet 1    potassium chloride (KLOR-CON M) 20 MEQ extended release tablet Take 2 tablets by mouth 2 times daily (with meals) 60 tablet 0            Allergies  Allergies   Allergen Reactions    Carbamazepine Other (See Comments)     Thrombocytopenia    Codeine Itching     Per ER    Phenytoin Itching    Sulfa Antibiotics      Per ER      Social   Social History     Tobacco Use    Smoking status: Current Every Day Smoker     Packs/day: 1.00     Years: 40.00     Pack years: 40.00    Smokeless tobacco: Never Used   Substance Use Topics    Alcohol use: Yes     Alcohol/week: 1.0 standard drinks     Types: 1 Cans of beer per week     Comment: daily        History reviewed. No pertinent family history.         Review of Systems  Constitutional: negative for fevers, chills, sweats    Ears, nose, mouth, throat, and face: negative for nasal congestion and sore throat   Respiratory: negative for cough and shortness of breath   Cardiovascular: negative for chest pain and dyspnea   Gastrointestinal: see hpi   Genitourinary:negative for dysuria and frequency   Integument/breast: negative for pruritus + rash   Hematologic/lymphatic: negative for bleeding and easy bruising Musculoskeletal:negative for arthralgias and myalgias   Neurological: negative for dizziness and weakness          Physical Exam  Blood pressure (!) 103/43, pulse 84, temperature 98 °F (36.7 °C), temperature source Oral, resp. rate 16, height 5' 11\" (1.803 m), weight 107 lb (48.5 kg), SpO2 95 %. General appearance: alert, cooperative, no distress, appears stated age  Eyes: Anicteric  Head: Normocephalic, without obvious abnormality  Lungs: clear to auscultation bilaterally, Normal Effort  Heart: regular rate and rhythm, normal S1 and S2, no murmurs or rubs  Abdomen: soft, non-tender. Bowel sounds normal. No masses,  no organomegaly. Extremities: atraumatic, no cyanosis or edema  Skin: warm and dry, no jaundice  Neuro: Grossly intact, A&OX3  Musculoskeletal: 5/5  strength BUE      Data Review:    Recent Labs     06/21/21  1152 06/22/21  0504 06/22/21  0956 06/23/21  0623   WBC 10.0 7.6  --  6.5   HGB 10.4* 8.1*  --  7.2*   HCT 30.8* 24.0* 22.7* 21.0*   MCV 89.5 90.4  --  90.3   PLT 92* 64*  --  63*     Recent Labs     06/21/21  1152 06/22/21  0503 06/22/21  0504 06/23/21  0623     --  138 134*   K 3.4*  --  3.6 3.4*   CL 92*  --  101 99   CO2 32  --  27 31   PHOS  --  3.1 2.9 1.9*   BUN 71*  --  42* 15   CREATININE 2.1*  --  1.3 1.0     Recent Labs     06/21/21  1152   AST 56*   ALT 30   BILITOT 1.5*   ALKPHOS 113     No results for input(s): LIPASE, AMYLASE in the last 72 hours. No results for input(s): PROTIME, INR in the last 72 hours. No results for input(s): PTT in the last 72 hours. No results for input(s): OCCULTBLD in the last 72 hours.     Imaging Studies:                 CT-scan of abdomen and pelvis w iv contrast 6/22/21:  Impression   Mild wall thickening involving the small bowel and ascending colon with mild   adjacent inflammatory changes.  Findings concerning for enterocolitis.       Gallbladder wall is hyperenhancing and thickened with mild adjacent   inflammatory stranding.  While the gallbladder wall thickening may be related   to the patient's liver disease, acute cholecystitis is not excluded. Clinical correlation recommended.  If indicated, right upper quadrant   ultrasound may be helpful for further evaluation.       Cirrhosis with trace ascites. US  Impression:     No echogenic stones are seen however the gallbladder wall is thickened with   pericholecystic fluid or edema.  Primary consideration is hepatocellular   dysfunction such as from cirrhosis or hepatitis.  Possibility of acalculous   cholecystitis is considered less likely.  However there is no significant   probe tenderness on focal compression. Trace ascites and cirrhotic morphology of the liver. Assessment:     Active Problems:    Screening for HIV (human immunodeficiency virus)    CATALINO (acute kidney injury) ([facility] [address]    Enterocolitis  Resolved Problems:    * No resolved hospital problems. *    Cirrhosis -new diagnosis. Suspect may be due to alcohol given chart review although patient denies heavy alcohol use. CT did also note calcifications in the head of the pancreas which could be from alcohol abuse. Only small volume ascites on CT. No GI bleeding. No clinical encephalopathy. Labs negative for hepatitis B and C.  Nausea, vomiting, diarrhea, epigastric pain -began a few days prior to admission. Symptoms have subsided other than some persistent epigastric pain. Is not related to eating. CT with possible enterocolitis which could explain his symptoms. The CT findings could possibly be due to cirrhosis and portal hypertension as well. Gallbladder wall was thickened which also could be from cirrhosis and portal hypertension. Ultrasound with GB wall thickening which could be from cirrhosis vs possible acalculous cholecystitis. Labs neg for Hep A/B/C.      Recommendations:   -Check stool for C.diff, GI bacterial pathogens PCR, and EIA for parasites if has further diarrhea  -We will need outpatient follow-up for cirrhosis  -Alcohol abstinence  -2 g low-sodium diet when taking p.o.  - check Hepatitis A total AB and hepatitis B surface AB for vaccination purposes  - check f-actin. CLARK was positive. Discussed with Dr. Maria Luisa Sanchez PA-C  [facility]    I have personally performed a face to face diagnostic evaluation on this patient. I have interviewed and examined the patient and I agree with the findings and recommended plan of care. In summary, my findings and plan are the following: Pt admitted with N/V and epigastric pain and on imaging studies was found to have cirrhosis which is a new diagnosis. Pt admits to only one 16oz beer every other day for years and never more per pt which would not be enough to cause cirrhosis but also has pancreatic calcifications. Pt states N/V and pain have since resolved. CT findings as above but diffuse mural thickening intestinal tract and GB wall could be from portal HTN. No gallstones. Pt has also had a declining hg. Reports black stools at home but has been using pepto. Pt just passed a stool which is very dark to black greenish and loose. FIT ordered and if positive, will perform an EGD in the am. Can also screen for varices at that time. Will look for other etiologies of cirrhosis. Pt did tell me that he thinks that the possible cancer they found was on his back.        Jessee Anderson MD  [address]

## 2021-06-23 NOTE — PROGRESS NOTES
Shift assessment complete. VSS. No complaints of pain. Call light within reach. No further needs at this time.

## 2021-06-23 NOTE — PROGRESS NOTES
Hospitalist Progress Note      PCP: No primary care provider on file. Date of Admission: 6/21/2021    Chief Complaint: Penile rash, nausea, vomiting    Hospital Course: John Davis is a 60 y.o. male  with history of alcohol abuse, cancer, seizure disorder, who presented with with complaints of nausea, vomiting and rash in the groin area onset of symptoms about 2 days ago. Labs showed acute kidney injury. ID service consulted for penile rash. Noted to have downtrending platelet count. Hematology service consulted on 6/22. Subjective: Met with patient at bedside. No acute events. Pt denies pain abdomen, nausea or vomitings. Currently npo for RUQ Ultrasound. Medications:  Reviewed    Infusion Medications    sodium chloride       Scheduled Medications    potassium phosphate (monobasic)  250 mg Oral TID WC    ciprofloxacin  400 mg Intravenous Q12H    metroNIDAZOLE  500 mg Intravenous R6F    folic acid  1 mg Oral Daily    levETIRAcetam  750 mg Oral BID    magnesium oxide  400 mg Oral BID    risperiDONE  1 mg Oral Nightly    thiamine mononitrate  100 mg Oral Daily    sodium chloride flush  5-40 mL Intravenous 2 times per day     PRN Meds: potassium chloride **OR** potassium alternative oral replacement **OR** potassium chloride, magnesium sulfate, sodium chloride flush, sodium chloride, ondansetron **OR** ondansetron, polyethylene glycol, acetaminophen **OR** acetaminophen      Intake/Output Summary (Last 24 hours) at 6/23/2021 1343  Last data filed at 6/23/2021 6844  Gross per 24 hour   Intake --   Output 815 ml   Net -815 ml       Physical Exam Performed:    BP (!) 113/56   Pulse 80   Temp 98.5 °F (36.9 °C) (Oral)   Resp 16   Ht 5' 11\" (1.803 m)   Wt 107 lb (48.5 kg)   SpO2 97%   BMI 14.92 kg/m²     General appearance: -American male, no apparent distress, appears stated age and cooperative. Respiratory:  Normal respiratory effort.  Clear to auscultation, bilaterally without Rales/Wheezes/Rhonchi. Cardiovascular: Regular rate and rhythm with normal S1/S2 without murmurs, rubs or gallops. Abdomen: Soft, non-tender, non-distended with normal bowel sounds. Musculoskeletal: No clubbing, cyanosis or edema bilaterally. Full range of motion without deformity. Skin: Skin color, texture, turgor normal.  No rashes or lesions. Neurologic:  Neurovascularly intact without any focal sensory/motor deficits. Cranial nerves: II-XII intact, grossly non-focal.  Psychiatric: Alert and oriented, thought content appropriate, normal insight  Capillary Refill: Brisk,3 seconds, normal   Peripheral Pulses: +2 palpable, equal bilaterally       Labs:   Recent Labs     06/21/21  1152 06/22/21  0504 06/22/21  0956 06/23/21  0623   WBC 10.0 7.6  --  6.5   HGB 10.4* 8.1*  --  7.2*   HCT 30.8* 24.0* 22.7* 21.0*   PLT 92* 64*  --  63*     Recent Labs     06/21/21  1152 06/22/21  0503 06/22/21  0504 06/23/21  0623     --  138 134*   K 3.4*  --  3.6 3.4*   CL 92*  --  101 99   CO2 32  --  27 31   BUN 71*  --  42* 15   CREATININE 2.1*  --  1.3 1.0   CALCIUM 9.1  --  7.8* 8.4   PHOS  --  3.1 2.9 1.9*     Recent Labs     06/21/21  1152   AST 56*   ALT 30   BILITOT 1.5*   ALKPHOS 113     No results for input(s): INR in the last 72 hours. Recent Labs     06/21/21  1152   TROPONINI <0.01       Urinalysis:      Lab Results   Component Value Date    NITRU Negative 06/21/2021    WBCUA 0 06/21/2021    RBCUA 3 06/21/2021    BLOODU Negative 06/21/2021    SPECGRAV 1.019 06/21/2021    GLUCOSEU Negative 06/21/2021       Radiology:  CT ABDOMEN PELVIS W IV CONTRAST Additional Contrast? None   Final Result   Mild wall thickening involving the small bowel and ascending colon with mild   adjacent inflammatory changes. Findings concerning for enterocolitis. Gallbladder wall is hyperenhancing and thickened with mild adjacent   inflammatory stranding.   While the gallbladder wall thickening may be related   to the patient's liver disease, acute cholecystitis is not excluded. Clinical correlation recommended. If indicated, right upper quadrant   ultrasound may be helpful for further evaluation. Cirrhosis with trace ascites. CT CHEST WO CONTRAST   Final Result   1. No acute airspace abnormality. No evidence of underlying pneumonia. 2. Moderate centrilobular emphysema and chronic interstitial lung disease   with overall pattern favoring UIP. XR CHEST PORTABLE   Final Result   Subtle patchy hazy opacities noted in the bilateral upper lungs which may   reflect airspace disease in the correct clinical setting. Possible spiculated nodule noted in the left upper lung. Further evaluation   with nonemergent chest CT is recommended. US GALLBLADDER RUQ    (Results Pending)           Assessment/Plan:    Active Hospital Problems    Diagnosis     Enterocolitis [K52.9]     CATALINO (acute kidney injury) ([facility] [address] [N17.9]     Screening for HIV (human immunodeficiency virus) [Z11.4]      Acute cholecystitis:   As noted on Ct abdomen. RUQ ultrasound pending. GI consulted. Anemia, black tarry stools:   Hb level trending down. Requested for GI consultation. Transfuse if level falls below 7. Penile rash, History of streptococcal bacteremia in 1/2021:  ID service consulted on admission. Test for HIV, chlamydia, gonorrhea and syphilis ordered and pending. On oral doxycycline as per ID recommendation. Cultures pending. CATALINO:  Likely prerenal.  Resolved with IV fluids. .    Generalized fatigue, nausea and diarrhea:  Etiology unclear. Currently resolved. Hyperuricemia:  Repeat level ordered and pending. Transaminitis:  Mildly elevated AST level. Monitor. Thrombocytopenia:  Platelet counts down from 94,000 to 64,000 today. No evidence of obvious bleed. Lovenox discontinued. Hematology consulted. Hypomagnesemia: Replacing as needed.     Other comorbidities:  Seizure disorder:  CAD:  Alcohol abuse:  Severe malnutrition:    DVT Prophylaxis: Lovenox held due to drop in platelet count. Diet: ADULT DIET; Regular  Code Status: Full Code    PT/OT Eval Status: Independent in room. Dispo -possible discharge in 1 to 2 days.     Patrick Celeste MD

## 2021-06-24 ENCOUNTER — ANESTHESIA EVENT (OUTPATIENT)
Dept: ENDOSCOPY | Age: 56
DRG: 682 | End: 2021-06-24
Payer: MEDICARE

## 2021-06-24 ENCOUNTER — ANESTHESIA (OUTPATIENT)
Dept: ENDOSCOPY | Age: 56
DRG: 682 | End: 2021-06-24
Payer: MEDICARE

## 2021-06-24 VITALS
OXYGEN SATURATION: 100 % | RESPIRATION RATE: 12 BRPM | SYSTOLIC BLOOD PRESSURE: 94 MMHG | DIASTOLIC BLOOD PRESSURE: 52 MMHG

## 2021-06-24 LAB
ALBUMIN SERPL-MCNC: 2.2 G/DL (ref 3.4–5)
ALP BLD-CCNC: 86 U/L (ref 40–129)
ALT SERPL-CCNC: 24 U/L (ref 10–40)
ANION GAP SERPL CALCULATED.3IONS-SCNC: 6 MMOL/L (ref 3–16)
AST SERPL-CCNC: 44 U/L (ref 15–37)
BASOPHILS ABSOLUTE: 0 K/UL (ref 0–0.2)
BASOPHILS RELATIVE PERCENT: 0.7 %
BILIRUB SERPL-MCNC: 0.7 MG/DL (ref 0–1)
BILIRUBIN DIRECT: 0.4 MG/DL (ref 0–0.3)
BILIRUBIN, INDIRECT: 0.3 MG/DL (ref 0–1)
BUN BLDV-MCNC: 7 MG/DL (ref 7–20)
CALCIUM IONIZED: 1.13 MMOL/L (ref 1.12–1.32)
CALCIUM SERPL-MCNC: 8.4 MG/DL (ref 8.3–10.6)
CHLORIDE BLD-SCNC: 100 MMOL/L (ref 99–110)
CO2: 30 MMOL/L (ref 21–32)
CREAT SERPL-MCNC: 0.9 MG/DL (ref 0.9–1.3)
CRYPTOSPORIDIUM ANTIGEN STOOL: ABNORMAL
E HISTOLYTICA ANTIGEN STOOL: ABNORMAL
EOSINOPHILS ABSOLUTE: 0.3 K/UL (ref 0–0.6)
EOSINOPHILS RELATIVE PERCENT: 4.5 %
GFR AFRICAN AMERICAN: >60
GFR NON-AFRICAN AMERICAN: >60
GI BACTERIAL PATHOGENS BY PCR: NORMAL
GIARDIA ANTIGEN STOOL: ABNORMAL
GLUCOSE BLD-MCNC: 110 MG/DL (ref 70–99)
HCT VFR BLD CALC: 22.9 % (ref 40.5–52.5)
HEMOGLOBIN: 7.8 G/DL (ref 13.5–17.5)
LYMPHOCYTES ABSOLUTE: 2.3 K/UL (ref 1–5.1)
LYMPHOCYTES RELATIVE PERCENT: 37.8 %
MAGNESIUM: 1.4 MG/DL (ref 1.8–2.4)
MCH RBC QN AUTO: 31.1 PG (ref 26–34)
MCHC RBC AUTO-ENTMCNC: 34 G/DL (ref 31–36)
MCV RBC AUTO: 91.2 FL (ref 80–100)
MONOCYTES ABSOLUTE: 0.8 K/UL (ref 0–1.3)
MONOCYTES RELATIVE PERCENT: 12.9 %
NEUTROPHILS ABSOLUTE: 2.7 K/UL (ref 1.7–7.7)
NEUTROPHILS RELATIVE PERCENT: 44.1 %
ORGANISM: ABNORMAL
ORGANISM: ABNORMAL
PDW BLD-RTO: 20 % (ref 12.4–15.4)
PH VENOUS: 7.49 (ref 7.35–7.45)
PHOSPHORUS: 3 MG/DL (ref 2.5–4.9)
PLATELET # BLD: 75 K/UL (ref 135–450)
PMV BLD AUTO: 7.4 FL (ref 5–10.5)
POTASSIUM SERPL-SCNC: 3.5 MMOL/L (ref 3.5–5.1)
RBC # BLD: 2.51 M/UL (ref 4.2–5.9)
SODIUM BLD-SCNC: 136 MMOL/L (ref 136–145)
SOLUBLE TRANSFERRIN RECEPT: 1.2 MG/L (ref 2.2–5)
TOTAL PROTEIN: 5.9 G/DL (ref 6.4–8.2)
WBC # BLD: 6.1 K/UL (ref 4–11)

## 2021-06-24 PROCEDURE — 2500000003 HC RX 250 WO HCPCS: Performed by: NURSE ANESTHETIST, CERTIFIED REGISTERED

## 2021-06-24 PROCEDURE — 82104 ALPHA-1-ANTITRYPSIN PHENO: CPT

## 2021-06-24 PROCEDURE — 85025 COMPLETE CBC W/AUTO DIFF WBC: CPT

## 2021-06-24 PROCEDURE — 3609012400 HC EGD TRANSORAL BIOPSY SINGLE/MULTIPLE: Performed by: INTERNAL MEDICINE

## 2021-06-24 PROCEDURE — 6370000000 HC RX 637 (ALT 250 FOR IP): Performed by: INTERNAL MEDICINE

## 2021-06-24 PROCEDURE — 83516 IMMUNOASSAY NONANTIBODY: CPT

## 2021-06-24 PROCEDURE — 6360000002 HC RX W HCPCS: Performed by: NURSE ANESTHETIST, CERTIFIED REGISTERED

## 2021-06-24 PROCEDURE — 82103 ALPHA-1-ANTITRYPSIN TOTAL: CPT

## 2021-06-24 PROCEDURE — 2580000003 HC RX 258: Performed by: FAMILY MEDICINE

## 2021-06-24 PROCEDURE — 2580000003 HC RX 258: Performed by: HOSPITALIST

## 2021-06-24 PROCEDURE — 80076 HEPATIC FUNCTION PANEL: CPT

## 2021-06-24 PROCEDURE — 1200000000 HC SEMI PRIVATE

## 2021-06-24 PROCEDURE — 7100000001 HC PACU RECOVERY - ADDTL 15 MIN: Performed by: INTERNAL MEDICINE

## 2021-06-24 PROCEDURE — 6360000002 HC RX W HCPCS: Performed by: INTERNAL MEDICINE

## 2021-06-24 PROCEDURE — 83735 ASSAY OF MAGNESIUM: CPT

## 2021-06-24 PROCEDURE — 88342 IMHCHEM/IMCYTCHM 1ST ANTB: CPT

## 2021-06-24 PROCEDURE — 3700000000 HC ANESTHESIA ATTENDED CARE: Performed by: INTERNAL MEDICINE

## 2021-06-24 PROCEDURE — 99232 SBSQ HOSP IP/OBS MODERATE 35: CPT | Performed by: INTERNAL MEDICINE

## 2021-06-24 PROCEDURE — 36415 COLL VENOUS BLD VENIPUNCTURE: CPT

## 2021-06-24 PROCEDURE — 2709999900 HC NON-CHARGEABLE SUPPLY: Performed by: INTERNAL MEDICINE

## 2021-06-24 PROCEDURE — 88305 TISSUE EXAM BY PATHOLOGIST: CPT

## 2021-06-24 PROCEDURE — 82330 ASSAY OF CALCIUM: CPT

## 2021-06-24 PROCEDURE — 7100000000 HC PACU RECOVERY - FIRST 15 MIN: Performed by: INTERNAL MEDICINE

## 2021-06-24 PROCEDURE — 6370000000 HC RX 637 (ALT 250 FOR IP): Performed by: HOSPITALIST

## 2021-06-24 PROCEDURE — 2500000003 HC RX 250 WO HCPCS: Performed by: INTERNAL MEDICINE

## 2021-06-24 PROCEDURE — 80069 RENAL FUNCTION PANEL: CPT

## 2021-06-24 PROCEDURE — 82390 ASSAY OF CERULOPLASMIN: CPT

## 2021-06-24 RX ORDER — OXYCODONE HYDROCHLORIDE 5 MG/1
5 TABLET ORAL PRN
Status: DISCONTINUED | OUTPATIENT
Start: 2021-06-24 | End: 2021-06-24 | Stop reason: HOSPADM

## 2021-06-24 RX ORDER — MAGNESIUM SULFATE IN WATER 40 MG/ML
2000 INJECTION, SOLUTION INTRAVENOUS ONCE
Status: COMPLETED | OUTPATIENT
Start: 2021-06-24 | End: 2021-06-24

## 2021-06-24 RX ORDER — KETAMINE HYDROCHLORIDE 10 MG/ML
INJECTION, SOLUTION INTRAMUSCULAR; INTRAVENOUS PRN
Status: DISCONTINUED | OUTPATIENT
Start: 2021-06-24 | End: 2021-06-24 | Stop reason: SDUPTHER

## 2021-06-24 RX ORDER — HYDROMORPHONE HCL 110MG/55ML
0.5 PATIENT CONTROLLED ANALGESIA SYRINGE INTRAVENOUS EVERY 5 MIN PRN
Status: DISCONTINUED | OUTPATIENT
Start: 2021-06-24 | End: 2021-06-24 | Stop reason: HOSPADM

## 2021-06-24 RX ORDER — PANTOPRAZOLE SODIUM 40 MG/1
40 TABLET, DELAYED RELEASE ORAL
Status: DISCONTINUED | OUTPATIENT
Start: 2021-06-25 | End: 2021-06-25 | Stop reason: HOSPADM

## 2021-06-24 RX ORDER — GLYCOPYRROLATE 0.2 MG/ML
INJECTION INTRAMUSCULAR; INTRAVENOUS PRN
Status: DISCONTINUED | OUTPATIENT
Start: 2021-06-24 | End: 2021-06-24 | Stop reason: SDUPTHER

## 2021-06-24 RX ORDER — LIDOCAINE HYDROCHLORIDE 20 MG/ML
INJECTION, SOLUTION EPIDURAL; INFILTRATION; INTRACAUDAL; PERINEURAL PRN
Status: DISCONTINUED | OUTPATIENT
Start: 2021-06-24 | End: 2021-06-24 | Stop reason: SDUPTHER

## 2021-06-24 RX ORDER — MIDAZOLAM HYDROCHLORIDE 1 MG/ML
INJECTION INTRAMUSCULAR; INTRAVENOUS PRN
Status: DISCONTINUED | OUTPATIENT
Start: 2021-06-24 | End: 2021-06-24 | Stop reason: SDUPTHER

## 2021-06-24 RX ORDER — FENTANYL CITRATE 50 UG/ML
50 INJECTION, SOLUTION INTRAMUSCULAR; INTRAVENOUS EVERY 5 MIN PRN
Status: DISCONTINUED | OUTPATIENT
Start: 2021-06-24 | End: 2021-06-24 | Stop reason: HOSPADM

## 2021-06-24 RX ORDER — DIPHENHYDRAMINE HYDROCHLORIDE 50 MG/ML
12.5 INJECTION INTRAMUSCULAR; INTRAVENOUS
Status: DISCONTINUED | OUTPATIENT
Start: 2021-06-24 | End: 2021-06-24 | Stop reason: HOSPADM

## 2021-06-24 RX ORDER — OXYCODONE HYDROCHLORIDE 5 MG/1
10 TABLET ORAL PRN
Status: DISCONTINUED | OUTPATIENT
Start: 2021-06-24 | End: 2021-06-24 | Stop reason: HOSPADM

## 2021-06-24 RX ORDER — LABETALOL HYDROCHLORIDE 5 MG/ML
5 INJECTION, SOLUTION INTRAVENOUS EVERY 10 MIN PRN
Status: DISCONTINUED | OUTPATIENT
Start: 2021-06-24 | End: 2021-06-24 | Stop reason: HOSPADM

## 2021-06-24 RX ORDER — HYDROMORPHONE HCL 110MG/55ML
0.25 PATIENT CONTROLLED ANALGESIA SYRINGE INTRAVENOUS EVERY 5 MIN PRN
Status: DISCONTINUED | OUTPATIENT
Start: 2021-06-24 | End: 2021-06-24 | Stop reason: HOSPADM

## 2021-06-24 RX ORDER — PROMETHAZINE HYDROCHLORIDE 25 MG/ML
6.25 INJECTION, SOLUTION INTRAMUSCULAR; INTRAVENOUS PRN
Status: DISCONTINUED | OUTPATIENT
Start: 2021-06-24 | End: 2021-06-24 | Stop reason: HOSPADM

## 2021-06-24 RX ORDER — MEPERIDINE HYDROCHLORIDE 25 MG/ML
12.5 INJECTION INTRAMUSCULAR; INTRAVENOUS; SUBCUTANEOUS EVERY 5 MIN PRN
Status: DISCONTINUED | OUTPATIENT
Start: 2021-06-24 | End: 2021-06-24 | Stop reason: HOSPADM

## 2021-06-24 RX ORDER — PROPOFOL 10 MG/ML
INJECTION, EMULSION INTRAVENOUS PRN
Status: DISCONTINUED | OUTPATIENT
Start: 2021-06-24 | End: 2021-06-24 | Stop reason: SDUPTHER

## 2021-06-24 RX ORDER — SODIUM CHLORIDE 9 MG/ML
INJECTION, SOLUTION INTRAVENOUS CONTINUOUS
Status: DISCONTINUED | OUTPATIENT
Start: 2021-06-24 | End: 2021-06-25 | Stop reason: HOSPADM

## 2021-06-24 RX ADMIN — LEVETIRACETAM 750 MG: 500 TABLET ORAL at 10:55

## 2021-06-24 RX ADMIN — SODIUM CHLORIDE: 9 INJECTION, SOLUTION INTRAVENOUS at 08:58

## 2021-06-24 RX ADMIN — METRONIDAZOLE 500 MG: 500 INJECTION, SOLUTION INTRAVENOUS at 00:15

## 2021-06-24 RX ADMIN — MAGNESIUM GLUCONATE 500 MG ORAL TABLET 400 MG: 500 TABLET ORAL at 21:15

## 2021-06-24 RX ADMIN — Medication 10 ML: at 21:15

## 2021-06-24 RX ADMIN — SODIUM CHLORIDE: 9 INJECTION, SOLUTION INTRAVENOUS at 09:28

## 2021-06-24 RX ADMIN — CIPROFLOXACIN 400 MG: 2 INJECTION, SOLUTION INTRAVENOUS at 04:44

## 2021-06-24 RX ADMIN — MAGNESIUM SULFATE HEPTAHYDRATE 2000 MG: 40 INJECTION, SOLUTION INTRAVENOUS at 11:04

## 2021-06-24 RX ADMIN — PROPOFOL 50 MG: 10 INJECTION, EMULSION INTRAVENOUS at 09:45

## 2021-06-24 RX ADMIN — METRONIDAZOLE 500 MG: 500 INJECTION, SOLUTION INTRAVENOUS at 16:36

## 2021-06-24 RX ADMIN — LIDOCAINE HYDROCHLORIDE 60 MG: 20 INJECTION, SOLUTION EPIDURAL; INFILTRATION; INTRACAUDAL; PERINEURAL at 09:39

## 2021-06-24 RX ADMIN — LEVETIRACETAM 750 MG: 500 TABLET ORAL at 21:14

## 2021-06-24 RX ADMIN — PHENYLEPHRINE HYDROCHLORIDE 100 MCG: 10 INJECTION INTRAVENOUS at 09:42

## 2021-06-24 RX ADMIN — MAGNESIUM GLUCONATE 500 MG ORAL TABLET 400 MG: 500 TABLET ORAL at 15:26

## 2021-06-24 RX ADMIN — GLYCOPYRROLATE 0.2 MG: 0.2 INJECTION INTRAMUSCULAR; INTRAVENOUS at 09:35

## 2021-06-24 RX ADMIN — PROPOFOL 50 MG: 10 INJECTION, EMULSION INTRAVENOUS at 09:39

## 2021-06-24 RX ADMIN — RISPERIDONE 1 MG: 1 TABLET ORAL at 21:18

## 2021-06-24 RX ADMIN — MAGNESIUM SULFATE HEPTAHYDRATE 1000 MG: 1 INJECTION, SOLUTION INTRAVENOUS at 07:57

## 2021-06-24 RX ADMIN — PROPOFOL 30 MG: 10 INJECTION, EMULSION INTRAVENOUS at 09:41

## 2021-06-24 RX ADMIN — MIDAZOLAM 1 MG: 1 INJECTION INTRAMUSCULAR; INTRAVENOUS at 09:35

## 2021-06-24 RX ADMIN — METRONIDAZOLE 500 MG: 500 INJECTION, SOLUTION INTRAVENOUS at 11:02

## 2021-06-24 RX ADMIN — THIAMINE HCL TAB 100 MG 100 MG: 100 TAB at 10:55

## 2021-06-24 RX ADMIN — KETAMINE HYDROCHLORIDE 15 MG: 10 INJECTION, SOLUTION INTRAMUSCULAR; INTRAVENOUS at 09:39

## 2021-06-24 RX ADMIN — Medication 10 ML: at 11:08

## 2021-06-24 RX ADMIN — POTASSIUM PHOSPHATE, MONOBASIC 250 MG: 500 TABLET, SOLUBLE ORAL at 10:54

## 2021-06-24 RX ADMIN — FOLIC ACID 1 MG: 1 TABLET ORAL at 10:55

## 2021-06-24 RX ADMIN — CIPROFLOXACIN 400 MG: 2 INJECTION, SOLUTION INTRAVENOUS at 16:37

## 2021-06-24 RX ADMIN — POTASSIUM PHOSPHATE, MONOBASIC 250 MG: 500 TABLET, SOLUBLE ORAL at 16:34

## 2021-06-24 ASSESSMENT — PULMONARY FUNCTION TESTS
PIF_VALUE: 1

## 2021-06-24 ASSESSMENT — PAIN - FUNCTIONAL ASSESSMENT: PAIN_FUNCTIONAL_ASSESSMENT: 0-10

## 2021-06-24 ASSESSMENT — ENCOUNTER SYMPTOMS
WHEEZING: 0
BACK PAIN: 0
TROUBLE SWALLOWING: 0
NAUSEA: 0
COUGH: 0
SORE THROAT: 0
ABDOMINAL PAIN: 0
DIARRHEA: 0
CONSTIPATION: 0
EYE DISCHARGE: 0
EYE REDNESS: 0
RHINORRHEA: 0
SHORTNESS OF BREATH: 0

## 2021-06-24 ASSESSMENT — PAIN SCALES - GENERAL
PAINLEVEL_OUTOF10: 0
PAINLEVEL_OUTOF10: 0

## 2021-06-24 NOTE — PROGRESS NOTES
Transporting patient up to room at this time    Electronically signed by Geronimo Sapp RN on 6/24/2021 at 9502

## 2021-06-24 NOTE — PROGRESS NOTES
Shift assessment complete. VSS. Patient NPO for EGD. Mg replaced per protocol. No further needs. Call light within reach.

## 2021-06-24 NOTE — ANESTHESIA PRE PROCEDURE
Department of Anesthesiology  Preprocedure Note       Name:  sIa Bates   Age:  60 y.o.  :  1965                                          MRN:  [id number]         Date:  2021      Surgeon: Corinthia Goodpasture):  Georgeana Lesch, MD    Procedure: Procedure(s):  EGD DIAGNOSTIC ONLY    Medications prior to admission:   Prior to Admission medications    Medication Sig Start Date End Date Taking?  Authorizing Provider   phenytoin (DILANTIN) 100 MG ER capsule Take 250 mg by mouth 2 times daily   Yes Historical Provider, MD   levETIRAcetam (KEPPRA) 750 MG tablet Take 1 tablet by mouth 2 times daily 18  Yes Melinda Connell MD   risperiDONE (RISPERDAL) 1 MG tablet Take 1 mg by mouth nightly   Yes Historical Provider, MD   folic acid (FOLVITE) 1 MG tablet Take 1 tablet by mouth daily 6/26/15  Yes Francoise Murillo MD   Multiple Vitamin (MULTIVITAMIN) tablet Take 1 tablet by mouth daily 6/26/15  Yes Francoise Murillo MD   vitamin B-1 100 MG tablet Take 1 tablet by mouth daily 6/26/15  Yes Francoise Murillo MD   magnesium oxide (MAG-OX) 400 (240 Mg) MG tablet Take 1 tablet by mouth 2 times daily 21   Bess Balbuena MD   potassium chloride (KLOR-CON M) 20 MEQ extended release tablet Take 2 tablets by mouth 2 times daily (with meals) 21   Bess Balbuena MD       Current medications:    Current Facility-Administered Medications   Medication Dose Route Frequency Provider Last Rate Last Admin    meperidine (DEMEROL) injection 12.5 mg  12.5 mg Intravenous Q5 Min PRN Chacorta Mccloud MD        HYDROmorphone (DILAUDID) injection 0.25 mg  0.25 mg Intravenous Q5 Min PRN Chacorta Mccloud MD        fentaNYL (SUBLIMAZE) injection 50 mcg  50 mcg Intravenous Q5 Min PRN Chacorta Mccloud MD        HYDROmorphone (DILAUDID) injection 0.25 mg  0.25 mg Intravenous Q5 Min PRN Chacorta Mccloud MD        HYDROmorphone (DILAUDID) injection 0.5 mg  0.5 mg Intravenous Q5 Min PRN MD Conchita Gutierrez oxyCODONE (ROXICODONE) immediate release tablet 5 mg  5 mg Oral PRN Hyland Landau, MD        Or    oxyCODONE (ROXICODONE) immediate release tablet 10 mg  10 mg Oral PRN Hyland Landau, MD        promethazine (PHENERGAN) injection 6.25 mg  6.25 mg Intravenous PRN Hyland Landau, MD        diphenhydrAMINE (BENADRYL) injection 12.5 mg  12.5 mg Intravenous Once PRN Hyland Landau, MD        labetalol (NORMODYNE;TRANDATE) injection 5 mg  5 mg Intravenous Q10 Min PRN Hyland Landau, MD        potassium chloride (KLOR-CON M) extended release tablet 40 mEq  40 mEq Oral PRN Mark Harman MD        Or    potassium bicarb-citric acid (EFFER-K) effervescent tablet 40 mEq  40 mEq Oral PRN Mark Harman MD        Or    potassium chloride 10 mEq/100 mL IVPB (Peripheral Line)  10 mEq Intravenous PRN Mark Harman MD        potassium phosphate (monobasic) (K-PHOS) tablet 250 mg  250 mg Oral TID WC Cosme Gallegos MD   250 mg at 06/23/21 1630    pantoprazole (PROTONIX) injection 40 mg  40 mg Intravenous BID Juan Daniel Ayon MD   40 mg at 06/23/21 2032    magnesium sulfate 1000 mg in dextrose 5% 100 mL IVPB  1,000 mg Intravenous PRN Mark Harman  mL/hr at 06/24/21 0757 1,000 mg at 06/24/21 0757    ciprofloxacin (CIPRO) IVPB 400 mg  400 mg Intravenous Q12H Mosetta Sever, MD   Stopped at 06/24/21 0544    metronidazole (FLAGYL) 500 mg in NaCl 100 mL IVPB premix  500 mg Intravenous Q8H Prince Bryan MD   Stopped at 89/74/54 8257    folic acid (FOLVITE) tablet 1 mg  1 mg Oral Daily Toni Hairston MD   1 mg at 06/23/21 1423    levETIRAcetam (KEPPRA) tablet 750 mg  750 mg Oral BID Toni Hairston MD   750 mg at 06/23/21 2033    magnesium oxide (MAG-OX) tablet 400 mg  400 mg Oral BID Toni Hairston MD   400 mg at 06/23/21 2033    risperiDONE (RISPERDAL) tablet 1 mg  1 mg Oral Nightly Toni Hairston MD   1 mg at 06/23/21 2033    thiamine mononitrate tablet 100 mg  100 mg Oral Daily Toni Hairston MD   100 mg at 06/23/21 1423    sodium chloride flush 0.9 % injection 5-40 mL  5-40 mL Intravenous 2 times per day Katerin Martin MD   10 mL at 06/23/21 2033    sodium chloride flush 0.9 % injection 5-40 mL  5-40 mL Intravenous PRN Toni Hairston MD        0.9 % sodium chloride infusion  25 mL Intravenous PRN Toni Hairston MD        ondansetron (ZOFRAN-ODT) disintegrating tablet 4 mg  4 mg Oral Q8H PRN Toni Hairston MD        Or    ondansetron (ZOFRAN) injection 4 mg  4 mg Intravenous Q6H PRN Toni Hairston MD        polyethylene glycol (GLYCOLAX) packet 17 g  17 g Oral Daily PRN Toni Hairston MD        acetaminophen (TYLENOL) tablet 650 mg  650 mg Oral Q6H PRN Toni Hairston MD   650 mg at 06/22/21 2008    Or    acetaminophen (TYLENOL) suppository 650 mg  650 mg Rectal Q6H PRN Katerin Martin MD           Allergies:     Allergies   Allergen Reactions    Carbamazepine Other (See Comments)     Thrombocytopenia    Codeine Itching     Per ER    Phenytoin Itching    Sulfa Antibiotics      Per ER       Problem List:    Patient Active Problem List   Diagnosis Code    Partial symptomatic epilepsy with complex partial seizures, intractable, without status epilepticus ([address] G40.219    Noncompliance with medication regimen Z91.14    Seizures ([address] R56.9    Alcohol abuse F10.10    Pneumonia J18.9    Nausea and vomiting R11.2    Breakthrough seizure ([address] G40.919    Acute febrile illness R50.9    Sepsis ([address] A41.9    Lactic acid acidosis E87.2    Smoker F17.200    Cerebrovascular small vessel disease I67.9    Streptococcal bacteremia R78.81, B95.5    Streptococcus viridans infection A49.1    Alcohol cessation counseling Z71.41    Tobacco abuse counseling Z71.6    Acute kidney injury ([address] N17.9    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 No pertinent surgical history. Social History:    Social History     Tobacco Use    Smoking status: Current Every Day Smoker     Packs/day: 1.00     Years: 40.00     Pack years: 40.00    Smokeless tobacco: Never Used   Substance Use Topics    Alcohol use: Yes     Alcohol/week: 1.0 standard drinks     Types: 1 Cans of beer per week     Comment: daily                                Ready to quit: Not Answered  Counseling given: Not Answered      Vital Signs (Current):   Vitals:    06/23/21 2332 06/24/21 0440 06/24/21 0545 06/24/21 0757   BP: (!) 110/54 (!) 99/43 (!) 105/48 (!) 96/56   Pulse:  82  74   Resp: 16 16  16   Temp: 98.7 °F (37.1 °C) 98.6 °F (37 °C)  98.6 °F (37 °C)   TempSrc: Oral Oral  Oral   SpO2: 97% 93%  94%   Weight:       Height:                                                  BP Readings from Last 3 Encounters:   06/24/21 (!) 96/56   01/05/21 109/71   12/27/19 (!) 132/48       NPO Status:                                                                                 BMI:   Wt Readings from Last 3 Encounters:   06/21/21 107 lb (48.5 kg)   01/04/21 116 lb 2.9 oz (52.7 kg)   12/27/19 150 lb (68 kg)     Body mass index is 14.92 kg/m².     CBC:   Lab Results   Component Value Date    WBC 6.1 06/24/2021    RBC 2.51 06/24/2021    HGB 7.8 06/24/2021    HCT 22.9 06/24/2021    MCV 91.2 06/24/2021    RDW 20.0 06/24/2021    PLT 75 06/24/2021       CMP:   Lab Results   Component Value Date     06/24/2021    K 3.5 06/24/2021    K 3.4 06/21/2021     06/24/2021    CO2 30 06/24/2021    BUN 7 06/24/2021    CREATININE 0.9 06/24/2021    GFRAA >60 06/24/2021    AGRATIO 0.6 06/21/2021    LABGLOM >60 06/24/2021    GLUCOSE 110 06/24/2021    PROT 5.9 06/24/2021    CALCIUM 8.4 06/24/2021    BILITOT 0.7 06/24/2021

## 2021-06-24 NOTE — PROGRESS NOTES
Hospitalist Progress Note      PCP: No primary care provider on file. Date of Admission: 6/21/2021    Chief Complaint: Penile rash, nausea, vomiting    Hospital Course: Vargas Vazquez is a 60 y.o. male  with history of alcohol abuse, cancer, seizure disorder, who presented with with complaints of nausea, vomiting and rash in the groin area onset of symptoms about 2 days ago. Labs showed acute kidney injury. ID service consulted for penile rash. Noted to have downtrending platelet count. Hematology service consulted on 6/22. Subjective: Met with patient at bedside. No acute events. Pt denies pain abdomen, nausea or vomitings. Underwent EGD in am. Denies pain abd, N/V or diarrhea.      Medications:  Reviewed    Infusion Medications    sodium chloride Stopped (06/24/21 1015)    sodium chloride       Scheduled Medications    magnesium oxide  400 mg Oral TID    magnesium sulfate  2,000 mg Intravenous Once    [START ON 6/25/2021] pantoprazole  40 mg Oral QAM AC    potassium phosphate (monobasic)  250 mg Oral TID WC    ciprofloxacin  400 mg Intravenous Q12H    metroNIDAZOLE  500 mg Intravenous Q3I    folic acid  1 mg Oral Daily    levETIRAcetam  750 mg Oral BID    risperiDONE  1 mg Oral Nightly    thiamine mononitrate  100 mg Oral Daily    sodium chloride flush  5-40 mL Intravenous 2 times per day     PRN Meds: potassium chloride **OR** potassium alternative oral replacement **OR** potassium chloride, magnesium sulfate, sodium chloride flush, sodium chloride, ondansetron **OR** ondansetron, polyethylene glycol, acetaminophen **OR** acetaminophen      Intake/Output Summary (Last 24 hours) at 6/24/2021 1228  Last data filed at 6/24/2021 0950  Gross per 24 hour   Intake 1140.49 ml   Output 1100 ml   Net 40.49 ml       Physical Exam Performed:    /62   Pulse 86   Temp 97.5 °F (36.4 °C) (Oral)   Resp 16   Ht 5' 11\" (1.803 m)   Wt 107 lb (48.5 kg)   SpO2 96%   BMI 14.92 kg/m² General appearance: -American male, no apparent distress, appears stated age and cooperative. Respiratory:  Normal respiratory effort. Clear to auscultation, bilaterally without Rales/Wheezes/Rhonchi. Cardiovascular: Regular rate and rhythm with normal S1/S2 without murmurs, rubs or gallops. Abdomen: Soft, non-tender, non-distended with normal bowel sounds. Musculoskeletal: No clubbing, cyanosis or edema bilaterally. Full range of motion without deformity. Skin: Skin color, texture, turgor normal.  No rashes or lesions. Neurologic:  Neurovascularly intact without any focal sensory/motor deficits. Cranial nerves: II-XII intact, grossly non-focal.  Psychiatric: Alert and oriented, thought content appropriate, normal insight  Capillary Refill: Brisk,3 seconds, normal   Peripheral Pulses: +2 palpable, equal bilaterally       Labs:   Recent Labs     06/22/21  0504 06/22/21  0956 06/23/21  0623 06/24/21  0532   WBC 7.6  --  6.5 6.1   HGB 8.1*  --  7.2* 7.8*   HCT 24.0* 22.7* 21.0* 22.9*   PLT 64*  --  63* 75*     Recent Labs     06/22/21  0504 06/23/21  0623 06/24/21  0532    134* 136   K 3.6 3.4* 3.5    99 100   CO2 27 31 30   BUN 42* 15 7   CREATININE 1.3 1.0 0.9   CALCIUM 7.8* 8.4 8.4   PHOS 2.9 1.9* 3.0     Recent Labs     06/24/21  0532   AST 44*   ALT 24   BILIDIR 0.4*   BILITOT 0.7   ALKPHOS 86     No results for input(s): INR in the last 72 hours. No results for input(s): Earnie Lent in the last 72 hours. Urinalysis:      Lab Results   Component Value Date    NITRU Negative 06/21/2021    WBCUA 0 06/21/2021    RBCUA 3 06/21/2021    BLOODU Negative 06/21/2021    SPECGRAV 1.019 06/21/2021    GLUCOSEU Negative 06/21/2021       Radiology:  US GALLBLADDER RUQ   Final Result   No echogenic stones are seen however the gallbladder wall is thickened with   pericholecystic fluid or edema. Primary consideration is hepatocellular   dysfunction such as from cirrhosis or hepatitis. Possibility of acalculous   cholecystitis is considered less likely. However there is no significant   probe tenderness on focal compression. Trace ascites and cirrhotic morphology of the liver. CT ABDOMEN PELVIS W IV CONTRAST Additional Contrast? None   Final Result   Mild wall thickening involving the small bowel and ascending colon with mild   adjacent inflammatory changes. Findings concerning for enterocolitis. Gallbladder wall is hyperenhancing and thickened with mild adjacent   inflammatory stranding. While the gallbladder wall thickening may be related   to the patient's liver disease, acute cholecystitis is not excluded. Clinical correlation recommended. If indicated, right upper quadrant   ultrasound may be helpful for further evaluation. Cirrhosis with trace ascites. CT CHEST WO CONTRAST   Final Result   1. No acute airspace abnormality. No evidence of underlying pneumonia. 2. Moderate centrilobular emphysema and chronic interstitial lung disease   with overall pattern favoring UIP. XR CHEST PORTABLE   Final Result   Subtle patchy hazy opacities noted in the bilateral upper lungs which may   reflect airspace disease in the correct clinical setting. Possible spiculated nodule noted in the left upper lung. Further evaluation   with nonemergent chest CT is recommended. Assessment/Plan:    Active Hospital Problems    Diagnosis     Enterocolitis [K52.9]     Acute kidney injury ([address] [N17.9]     Alcohol cessation counseling [Z71.41]      Acute cholecystitis:   As noted on Ct abdomen. RUQ ultrasound with no acute findings. GI consulted. No need for further w/u. Abnormal LFTs, Cirrhosis:   W/u for Cirrhosis pending. Can be followed up as outpt as per GI. Anemia, black tarry stools s/p EGD:   Hb level trending down. EGD done today am, 6/24 showed erosive esophagitis. Transfuse if level falls below 7.      Penile rash, History of streptococcal bacteremia in 1/2021:  ID service consulted. Cause Scabies vs bacterial infection. S/p Ivermectin on 6/21. Recommend repeat dose of 9 mg on 6/28. STDs, HIV, Hep screen negative. On IV Cipro and Flagyl as per ID recommendation. CATALINO:  Likely prerenal.  Resolved with IV fluids. .    Generalized fatigue, nausea and diarrhea:  Currently resolved. Stool tested positive for Cryptosporidium and Giardia. Thrombocytopenia:  Platelet count at 00,821 today. No evidence of obvious bleed. Lovenox discontinued. Pt ambulating. Hematology consulted. Hypomagnesemia: Replacing as needed. Other comorbidities:  Seizure disorder:  CAD:  Alcohol abuse:  Severe malnutrition:    DVT Prophylaxis: Lovenox held due to drop in platelet count. Diet: ADULT DIET; Regular; No Added Salt (3-4 gm); Low Fiber  Adult Oral Nutrition Supplement; Standard High Calorie/High Protein Oral Supplement  Code Status: Full Code    PT/OT Eval Status: Independent in room. Dispo -possible discharge in 1 to 2 days.     Manoj De La Vega MD

## 2021-06-24 NOTE — PROGRESS NOTES
Patient's awake and alert. On room air. NSR on the monitor. VSS. Denies pain or nausea. Patient meets criteria to be discharged from phase 1.  Will prepare for transfer to room after Dr. Milena Hidalgo sees patient    Electronically signed by Radha Vázquez RN on 6/24/2021 at 0924

## 2021-06-24 NOTE — OP NOTE
Operative Note      EGD    IMPRESSION :   LA Class B erosive esophagitis  No varices  Diffuse changes throughout stomach suggestive of portal  hypertensive gastropathy. Biopsies obtained for H pylori  5 clean based ulcers. largest about 7mm  3 clean based ulcers.  Largest about 1 cm  No hematin throughout  PLAN : PPI  slowly advance diet  Await path  EBL : Less than 5 cc    Elvie Cruz MD

## 2021-06-24 NOTE — PROGRESS NOTES
Infectious Diseases   Progress Note      Admission Date: 6/21/2021  Hospital Day: Hospital Day: 4   Attending: Dickson Saha MD  Date of service: 6/24/2021     Chief complaint/ Reason for consult:     · Penile rash - *scabies versus bacterial infection  · Hypokalemia  · Hypomagnesemia  · Acute kidney injury with elevated serum creatinine of 2.1  · Transaminitis with elevated AST of 56   · Elevated total bilirubin of 1.5  · History of streptococcal bacteremia in January 2021    Microbiology:        I have reviewed allavailable micro lab data and cultures    · Blood culture (2/2) - collected on 6/21/2021: In process        Antibiotics and immunizations:       Current antibiotics: All antibiotics and their doses were reviewed by me    Recent Abx Admin                   metronidazole (FLAGYL) 500 mg in NaCl 100 mL IVPB premix (mg) 500 mg New Bag 06/24/21 1102     500 mg New Bag  0015     500 mg New Bag 06/23/21 1634    ciprofloxacin (CIPRO) IVPB 400 mg (mg) 400 mg New Bag 06/24/21 0444     400 mg New Bag 06/23/21 1741                  Immunization History: All immunization history was reviewed by me today. There is no immunization history for the selected administration types on file for this patient. Known drug allergies: All allergies were reviewed and updated    Allergies   Allergen Reactions    Carbamazepine Other (See Comments)     Thrombocytopenia    Codeine Itching     Per ER    Phenytoin Itching    Sulfa Antibiotics      Per ER       Social history:     Social History:  All social andepidemiologic history was reviewed and updated by me today as needed. · Tobacco use:   reports that he has been smoking. He has a 40.00 pack-year smoking history. He has never used smokeless tobacco.  · Alcohol use:   reports current alcohol use of about 1.0 standard drinks of alcohol per week. · Currently lives in: [place] [address]  ·  reports current drug use. Drug: Marijuana.      COVID VACCINATION AND LAB RESULT RECORDS:     Internal Administration   First Dose      Second Dose           Last COVID Lab SARS-CoV-2, NAAT (no units)   Date Value   12/30/2020 Not Detected            Assessment:     The patient is a 60 y.o. old male who  has a past medical history of Alcohol abuse, Cancer ([facility] [address] and Seizures ([facility] [address] with following problems:    · Penile rash - *scabies versus bacterial infection-s/p ivermectin on 6/21/2021-will need repeat dose of ivermectin on 6/28/2021  · Hypokalemia corrected  · Hypomagnesemia-corrected  · Acute kidney injury-resolved  · Transaminitis with elevated AST of 56-improving  · Elevated uric acid  · Elevated total bilirubin of 1.5  · History of streptococcal bacteremia in January 2021  · Seizure disorder  · History of marijuana abuse-counseling done  · Chronic small vessel ischemia  · Smoker-counseling done  · Alcohol abuse-counseling done  · Emaciation: Body mass index is 14.37 kg/m². Discussion:      The patient is on IV ciprofloxacin and Flagyl. He is afebrile. He is tolerating antibiotics okay. Serum creatinine 0.9. Liver functions are okay. The patient is immune to hepatitis B. Right upper quadrant ultrasound indicated liver cirrhosis. The gallbladder was thickened with some pericholecystic fluid    The patient underwent an EGD today and was noted to have portal hypertensive gastropathy with erosive esophagitis. Plan:     Diagnostic Workup:      · Continue to follow  fever curve, WBC count and blood cultures. · Continue to monitor blood counts, liver and renal function. Antimicrobials:    · Will continue IV Cipro 400 mg every 12 hour  · Continue IV Flagyl 500 mg every 8 hour  · Will plan to switch antibiotics to oral at discharge  · Will need repeat ivermectin 9 mg, 1 week after the first dose  · Contact isolation for scabies  · We will follow up on the culture results and clinical progress and will make further recommendations accordingly.   · Continue close vitals monitoring. · Maintain good glycemic control. · Fall precautions. Aspiration precautions. · Continue to watch for new fever or diarrhea. · DVT prophylaxis. · Discussed all above with patient and RN. Drug Monitoring:    · Continue monitoring for antibiotic toxicity as follows: CBC, CMP, QTc interval  · Continue to watch for following: new or worsening fever, new hypotension, hives, lip swelling and redness or purulence at vascular access sites. I/v access Management:    · Continue to monitor i.v access sites for erythema, induration, discharge or tenderness. · As always, continue efforts to minimize tubes/lines/drains as clinically appropriate to reduce chances of line associated infections. Patient education and counseling:       · The patient was educated in detail about the side-effects of various antibiotics and things to watch for like new rashes, lip swelling, severe reaction, worsening diarrhea, break through fever etc.  · Discussed patient's condition and what to expect. All of the patient's questions were addressed in a satisfactory manner and patient verbalized understanding all instructions. Thank you for involving me in the care of your patient. I will continue to follow. If you have anyadditional questions, please do not hesitate to contact me. Subjective: Interval history: Interval history was obtained from chart review and patient/ RN. He is afebrile. Tolerating antibiotics okay. No diarrhea     REVIEW OF SYSTEMS:      Review of Systems   Constitutional: Negative for chills, diaphoresis and fever. HENT: Negative for ear discharge, ear pain, rhinorrhea, sore throat and trouble swallowing. Eyes: Negative for discharge and redness. Respiratory: Negative for cough, shortness of breath and wheezing. Cardiovascular: Negative for chest pain and leg swelling. Gastrointestinal: Negative for abdominal pain, constipation, diarrhea and nausea.    Endocrine: normal.      Pupils: Pupils are equal, round, and reactive to light. Cardiovascular:      Rate and Rhythm: Normal rate and regular rhythm. Pulses: Normal pulses. Heart sounds: No murmur heard. No friction rub. Pulmonary:      Effort: Pulmonary effort is normal. No respiratory distress. Breath sounds: Normal breath sounds. No stridor. No wheezing, rhonchi or rales. Abdominal:      General: Bowel sounds are normal.      Palpations: Abdomen is soft. Tenderness: There is no abdominal tenderness. There is no right CVA tenderness, left CVA tenderness, guarding or rebound. Musculoskeletal:         General: No swelling or tenderness. Normal range of motion. Cervical back: Normal range of motion and neck supple. No rigidity. No muscular tenderness. Lymphadenopathy:      Cervical: No cervical adenopathy. Skin:     General: Skin is warm and dry. Coloration: Skin is not jaundiced. Findings: Rash (Improving penile and scrotum rash) present. No erythema. Neurological:      General: No focal deficit present. Mental Status: He is alert and oriented to person, place, and time. Mental status is at baseline. Motor: No abnormal muscle tone. Psychiatric:         Mood and Affect: Mood normal.         Behavior: Behavior normal.         Thought Content: Thought content normal.             Lines: All vascular access sites are healthy with no local erythema, discharge or tenderness. Intake and output:    I/O last 3 completed shifts: In: 1380.5 [P.O.:480; I.V.:400; IV Piggyback:500.5]  Out: 1100 [Urine:1100]    Lab Data:   All available labs and old records have been reviewed by me.     CBC:  Recent Labs     06/22/21  0504 06/22/21  0956 06/23/21  0623 06/24/21  0532   WBC 7.6  --  6.5 6.1   RBC 2.65*  --  2.33* 2.51*   HGB 8.1*  --  7.2* 7.8*   HCT 24.0* 22.7* 21.0* 22.9*   PLT 64*  --  63* 75*   MCV 90.4  --  90.3 91.2   MCH 30.5  --  31.1 31.1   MCHC 33.7  --  34.4 34.0 RDW 19.9*  --  19.6* 20.0*        BMP:  Recent Labs     06/22/21  0504 06/23/21  0623 06/24/21  0532    134* 136   K 3.6 3.4* 3.5    99 100   CO2 27 31 30   BUN 42* 15 7   CREATININE 1.3 1.0 0.9   CALCIUM 7.8* 8.4 8.4   GLUCOSE 103* 136* 110*        Hepatic Function Panel:   Lab Results   Component Value Date    ALKPHOS 86 06/24/2021    ALT 24 06/24/2021    AST 44 06/24/2021    PROT 5.9 06/24/2021    BILITOT 0.7 06/24/2021    BILIDIR 0.4 06/24/2021    IBILI 0.3 06/24/2021    LABALBU 2.2 06/24/2021       CPK:   Lab Results   Component Value Date    CKTOTAL 118 12/29/2020     ESR: No results found for: SEDRATE  CRP: No results found for: CRP        Imaging: All pertinent images and reports for the current visit were reviewed by me during this visit. US GALLBLADDER RUQ   Final Result   No echogenic stones are seen however the gallbladder wall is thickened with   pericholecystic fluid or edema. Primary consideration is hepatocellular   dysfunction such as from cirrhosis or hepatitis. Possibility of acalculous   cholecystitis is considered less likely. However there is no significant   probe tenderness on focal compression. Trace ascites and cirrhotic morphology of the liver. CT ABDOMEN PELVIS W IV CONTRAST Additional Contrast? None   Final Result   Mild wall thickening involving the small bowel and ascending colon with mild   adjacent inflammatory changes. Findings concerning for enterocolitis. Gallbladder wall is hyperenhancing and thickened with mild adjacent   inflammatory stranding. While the gallbladder wall thickening may be related   to the patient's liver disease, acute cholecystitis is not excluded. Clinical correlation recommended. If indicated, right upper quadrant   ultrasound may be helpful for further evaluation. Cirrhosis with trace ascites. CT CHEST WO CONTRAST   Final Result   1. No acute airspace abnormality.   No evidence of underlying pneumonia. 2. Moderate centrilobular emphysema and chronic interstitial lung disease   with overall pattern favoring UIP. XR CHEST PORTABLE   Final Result   Subtle patchy hazy opacities noted in the bilateral upper lungs which may   reflect airspace disease in the correct clinical setting. Possible spiculated nodule noted in the left upper lung. Further evaluation   with nonemergent chest CT is recommended. Medications: All current and past medications were reviewed.      magnesium oxide  400 mg Oral TID    [START ON 6/25/2021] pantoprazole  40 mg Oral QAM AC    potassium phosphate (monobasic)  250 mg Oral TID WC    ciprofloxacin  400 mg Intravenous Q12H    metroNIDAZOLE  500 mg Intravenous O6I    folic acid  1 mg Oral Daily    levETIRAcetam  750 mg Oral BID    risperiDONE  1 mg Oral Nightly    thiamine mononitrate  100 mg Oral Daily    sodium chloride flush  5-40 mL Intravenous 2 times per day        sodium chloride Stopped (06/24/21 1015)    sodium chloride         potassium chloride **OR** potassium alternative oral replacement **OR** potassium chloride, magnesium sulfate, sodium chloride flush, sodium chloride, ondansetron **OR** ondansetron, polyethylene glycol, acetaminophen **OR** acetaminophen      Problem list:       Patient Active Problem List   Diagnosis Code    Partial symptomatic epilepsy with complex partial seizures, intractable, without status epilepticus ([facility] [address] G40.219    Noncompliance with medication regimen Z91.14    Seizures ([address] R56.9    Alcohol abuse F10.10    Pneumonia J18.9    Nausea and vomiting R11.2    Breakthrough seizure ([address] G40.919    Acute febrile illness R50.9    Sepsis ([address] A41.9    Lactic acid acidosis E87.2    Smoker F17.200    Cerebrovascular small vessel disease I67.9    Streptococcal bacteremia R78.81, B95.5    Streptococcus viridans infection A49.1    Drug abuse counseling and surveillance of drug abuser Z71.51    Tobacco abuse counseling Z71.6    CATALINO (acute kidney injury) ([facility] N17.9    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 Although every effort was made to ensure the accuracy of this automated transcription, some errors in transcription may have occurred inadvertently. If you may need any clarification, please do not hesitate to contact me through EPIC or at the phone number provided below with my electronic signature. Any pictures or media included in this note were obtained after taking informed verbal consent from the patient and with their approval to include those in the patient's medical record.     Mosetta Sever, MD, MPH  6/24/2021 , 3:24 PM   [facility] Infectious Disease   [address] [place] [address], [address]  Office: [phone]  Fax: [phone]  Clinic days:  Tuesday & Thursday

## 2021-06-24 NOTE — PROGRESS NOTES
[facility]   Progress Note      6/24/2021 7:59 AM        Name: Kim Sexton .               Admitted: 6/21/2021    SUBJECTIVE:  He is doing ok, offers no complaints, no external bleeding, planning on have EGD today    Reviewed interval ancillary notes    Current Medications  potassium chloride (KLOR-CON M) extended release tablet 40 mEq, PRN   Or  potassium bicarb-citric acid (EFFER-K) effervescent tablet 40 mEq, PRN   Or  potassium chloride 10 mEq/100 mL IVPB (Peripheral Line), PRN  potassium phosphate (monobasic) (K-PHOS) tablet 250 mg, TID WC  pantoprazole (PROTONIX) injection 40 mg, BID  magnesium sulfate 1000 mg in dextrose 5% 100 mL IVPB, PRN  ciprofloxacin (CIPRO) IVPB 400 mg, Q12H  metronidazole (FLAGYL) 500 mg in NaCl 100 mL IVPB premix, B0J  folic acid (FOLVITE) tablet 1 mg, Daily  levETIRAcetam (KEPPRA) tablet 750 mg, BID  magnesium oxide (MAG-OX) tablet 400 mg, BID  risperiDONE (RISPERDAL) tablet 1 mg, Nightly  thiamine mononitrate tablet 100 mg, Daily  sodium chloride flush 0.9 % injection 5-40 mL, 2 times per day  sodium chloride flush 0.9 % injection 5-40 mL, PRN  0.9 % sodium chloride infusion, PRN  ondansetron (ZOFRAN-ODT) disintegrating tablet 4 mg, Q8H PRN   Or  ondansetron (ZOFRAN) injection 4 mg, Q6H PRN  polyethylene glycol (GLYCOLAX) packet 17 g, Daily PRN  acetaminophen (TYLENOL) tablet 650 mg, Q6H PRN   Or  acetaminophen (TYLENOL) suppository 650 mg, Q6H PRN        Objective:  BP (!) 96/56   Pulse 74   Temp 98.6 °F (37 °C) (Oral)   Resp 16   Ht 5' 11\" (1.803 m)   Wt 107 lb (48.5 kg)   SpO2 94%   BMI 14.92 kg/m²     Intake/Output Summary (Last 24 hours) at 6/24/2021 0759  Last data filed at 6/24/2021 0557  Gross per 24 hour   Intake 740.49 ml   Output 1540 ml   Net -799.51 ml      Wt Readings from Last 3 Encounters:   06/21/21 107 lb (48.5 kg)   01/04/21 116 lb 2.9 oz (52.7 kg)   12/27/19 150 lb (68 kg)       General appearance:  Appears comfortable  Eyes: Sclera clear. Pupils equal.  ENT: Moist oral mucosa. Trachea midline, no adenopathy. Cardiovascular: Regular rhythm, normal S1, S2. No murmur. No edema in lower extremities  Respiratory: Not using accessory muscles. Good inspiratory effort. Clear to auscultation bilaterally, no wheeze or crackles. GI: Abdomen soft, no tenderness, not distended  Musculoskeletal: No cyanosis in digits, neck supple  Neurology: CN 2-12 grossly intact. No speech or motor deficits  Psych: Normal affect. Alert and oriented in time, place and person  Skin: Warm, dry, normal turgor    Labs and Tests:  CBC:   Recent Labs     06/22/21  0504 06/23/21  0623 06/24/21  0532   WBC 7.6 6.5 6.1   HGB 8.1* 7.2* 7.8*   PLT 64* 63* 75*     BMP:    Recent Labs     06/22/21  0504 06/23/21  0623 06/24/21  0532    134* 136   K 3.6 3.4* 3.5    99 100   CO2 27 31 30   BUN 42* 15 7   CREATININE 1.3 1.0 0.9   GLUCOSE 103* 136* 110*     Hepatic:   Recent Labs     06/21/21  1152 06/24/21  0532   AST 56* 44*   ALT 30 24   BILITOT 1.5* 0.7   ALKPHOS 113 86       ASSESSMENT AND PLAN    Active Problems:    Alcohol cessation counseling    Acute kidney injury ([facility] [address]    Enterocolitis  Resolved Problems:    * No resolved hospital problems.  *      Anemia and thrombocytopenia  - normochromic/normocytic anemia  - no iron, B12/folate deficiency  - haptoglobin - 55, no evidence of hemolysis  - CLARK - pending  - CT abd/pelvis - showed cirrhosis, possibly acute cholecystitis  - US gallbladder - showed gallbladder wall thickening, no stones  - stool positive for occult blood - EGD today    Possible sepsis  - ID following  - on antibiotics  - blood cultures pending         Merly Mclean, [address] [facility]  [facility]

## 2021-06-24 NOTE — PROGRESS NOTES
MD Jose Sosa MD Darry Gilding, MD               Office: [phone]                      Fax: [phone]             [facility]                   NEPHROLOGY INPATIENT PROGRESS NOTE:     PATIENT NAME: Emily Joseph  : 1965  MRN: [id number]       RECOMMENDATIONS:   -Stopped LR, allowing more PO hydration  -added K-Phos - continue   - Mag lower again - Give 2g IV + increase PO BID to TID   - HSV management per 1' team     D/C plan from renal stand point: Stable     Have D/W patient, hospitalist, nurse       IMPRESSION:       Admitted for:  CATALINO (acute kidney injury) ([facility] [address] [N17.9]      CATALINO (on no CKD:): better  - BL Scr- 0.7-0.8 (till 2021 ---> 2.1 on admission  -: Etiology of CATALINO - pre-renal + Early ATN w/ hypovolemia    - other differentials: r/o obstruction,   - UA - bland, clear, so unlikely GN / TI / TMA process  - urine lytes = hypovolemia       Associated problems:   - Volume status: mild hypo-volemic  : BP: lower -  No need for tight control  : Na: WNL    - Azotemia: severe 71 : ?pre-renal, no steroids, ? UGIB w/ lower Hb    - Electrolytes: K: hypokalemia - mild   HypoMagnesemia - mild     - Lactic acidosis -?hypoperfusion+ CATALINO + starvation - alcohol ketosis    - Anemia: mild - follow        Other major problems: Management per primary and other consulting teams. U tox (+) for Cannabinoids     Hospital Problems         Last Modified POA    Alcohol cessation counseling 2021 Yes    Acute kidney injury ([facility] [address] 2021 Yes    Enterocolitis 2021 Yes             Please refer to the orders. High Complexity. Multiple complex problems. Discussed with patient and treatment team-    Time spent > 30 (~35) minutes. Thank you for allowing me to participate in this patient's care. Please do not hesitate to contact me anytime. We will follow along with you.        Darrin Martinez MD,  [facility] [address] [facility]: ([phone]) Oral, PRN **OR** oxyCODONE (ROXICODONE) immediate release tablet 10 mg, 10 mg, Oral, PRN, Janeice Bloch, MD    promethazine [facility]) injection 6.25 mg, 6.25 mg, Intravenous, PRN, Janeice Bloch, MD    diphenhydrAMINE (BENADRYL) injection 12.5 mg, 12.5 mg, Intravenous, Once PRN, Janeice Bloch, MD    labetalol (NORMODYNE;TRANDATE) injection 5 mg, 5 mg, Intravenous, Q10 Min PRN, Janeice Bloch, MD    0.9 % sodium chloride infusion, , Intravenous, Continuous, Janeice Bloch, MD, Last Rate: 100 mL/hr at 06/24/21 0858, New Bag at 06/24/21 0858    potassium chloride (KLOR-CON M) extended release tablet 40 mEq, 40 mEq, Oral, PRN **OR** potassium bicarb-citric acid (EFFER-K) effervescent tablet 40 mEq, 40 mEq, Oral, PRN **OR** potassium chloride 10 mEq/100 mL IVPB (Peripheral Line), 10 mEq, Intravenous, PRN, Judy Chun MD    potassium phosphate (monobasic) (K-PHOS) tablet 250 mg, 250 mg, Oral, TID WC, Shantal Encarnacion MD, 250 mg at 06/23/21 1630    pantoprazole (PROTONIX) injection 40 mg, 40 mg, Intravenous, BID, Humberto Barnett MD, 40 mg at 06/23/21 2032    magnesium sulfate 1000 mg in dextrose 5% 100 mL IVPB, 1,000 mg, Intravenous, PRN, Judy Chun MD, Last Rate: 100 mL/hr at 06/24/21 0757, 1,000 mg at 06/24/21 0757    ciprofloxacin (CIPRO) IVPB 400 mg, 400 mg, Intravenous, Q12H, Johanna Tena MD, Stopped at 06/24/21 0544    metronidazole (FLAGYL) 500 mg in NaCl 100 mL IVPB premix, 500 mg, Intravenous, Q8H, Johanna Tena MD, Stopped at 42/20/52 1347    folic acid (FOLVITE) tablet 1 mg, 1 mg, Oral, Daily, Toni Hairston MD, 1 mg at 06/23/21 1423    levETIRAcetam (KEPPRA) tablet 750 mg, 750 mg, Oral, BID, Toni Hairston MD, 750 mg at 06/23/21 2033    magnesium oxide (MAG-OX) tablet 400 mg, 400 mg, Oral, BID, Toni Hairston MD, 400 mg at 06/23/21 2033    risperiDONE (RISPERDAL) tablet 1 mg, 1 mg, Oral, Nightly, Toni Hairston MD, 1 mg at 06/23/21 2033    thiamine mononitrate tablet 100 mg, 100 mg, Oral, Daily, Samra Camara MD, 100 mg at 06/23/21 1423    sodium chloride flush 0.9 % injection 5-40 mL, 5-40 mL, Intravenous, 2 times per day, Samra Camara MD, 10 mL at 06/23/21 2033    sodium chloride flush 0.9 % injection 5-40 mL, 5-40 mL, Intravenous, PRN, Samra Camara MD    0.9 % sodium chloride infusion, 25 mL, Intravenous, PRN, Samra Camara MD    ondansetron (ZOFRAN-ODT) disintegrating tablet 4 mg, 4 mg, Oral, Q8H PRN **OR** ondansetron (ZOFRAN) injection 4 mg, 4 mg, Intravenous, Q6H PRN, Samra Camara MD    polyethylene glycol (GLYCOLAX) packet 17 g, 17 g, Oral, Daily PRN, Samra Camara MD    acetaminophen (TYLENOL) tablet 650 mg, 650 mg, Oral, Q6H PRN, 650 mg at 06/22/21 2008 **OR** acetaminophen (TYLENOL) suppository 650 mg, 650 mg, Rectal, Q6H PRN, Samra Camara MD    REVIEW OF SYSTEMS:  As mentioned in chief complaint and HPI , Subjective    PHYSICAL EXAM:  Recent vital signs and recent I/Os reviewed by me.      Wt Readings from Last 3 Encounters:   06/21/21 107 lb (48.5 kg)   01/04/21 116 lb 2.9 oz (52.7 kg)   12/27/19 150 lb (68 kg)     BP Readings from Last 3 Encounters:   06/24/21 (!) 100/51   01/05/21 109/71   12/27/19 (!) 132/48     Patient Vitals for the past 24 hrs:   BP Temp Temp src Pulse Resp SpO2   06/24/21 0843 (!) 100/51 97.1 °F (36.2 °C) Temporal 70 16 --   06/24/21 0757 (!) 96/56 98.6 °F (37 °C) Oral 74 16 94 %   06/24/21 0545 (!) 105/48 -- -- -- -- --   06/24/21 0440 (!) 99/43 98.6 °F (37 °C) Oral 82 16 93 %   06/23/21 2332 (!) 110/54 98.7 °F (37.1 °C) Oral -- 16 97 %   06/23/21 2015 (!) 123/57 98.6 °F (37 °C) Oral 80 16 98 %   06/23/21 1631 (!) 101/58 98.3 °F (36.8 °C) Oral 71 16 100 %   06/23/21 1148 (!) 113/56 -- -- 80 -- --   06/23/21 1144 (!) 175/148 98.5 °F (36.9 °C) Oral 128 16 97 %       Intake/Output Summary (Last 24 hours) at 6/24/2021 0938  Last data filed at 6/24/2021 0557  Gross per 24 hour   Intake 740.49 ml   Output 1100 ml   Net -359.51 ml         Physical 0.9     Recent Labs     06/21/21  1152 06/22/21  0503 06/22/21  0504 06/23/21  0623 06/24/21  0532   CALCIUM 9.1  --  7.8* 8.4 8.4   MG 1.60*  --  1.70* 1.80 1.40*   PHOS  --  3.1 2.9 1.9* 3.0     No results for input(s): PH, PCO2, PO2 in the last 72 hours.     Invalid input(s): Jose Angel Howek    ABG:  No results found for: PH, PCO2, PO2, HCO3, BE, THGB, TCO2, O2SAT  VBG:    Lab Results   Component Value Date    PHVEN 7.492 06/24/2021       LDH:  No results found for: LDH  Uric Acid:    Lab Results   Component Value Date    LABURIC 10.6 06/22/2021       PT/INR:    Lab Results   Component Value Date    PROTIME 12.0 05/16/2018    INR 1.06 05/16/2018     Warfarin PT/INR:  No components found for: PTPATWAR, PTINRWAR  PTT:  No results found for: APTT, PTT[APTT}  Last 3 Troponin:    Lab Results   Component Value Date    TROPONINI <0.01 06/21/2021    TROPONINI <0.01 12/29/2020    TROPONINI <0.01 05/15/2018       U/A:    Lab Results   Component Value Date    COLORU YELLOW 06/21/2021    PROTEINU Negative 06/21/2021    PHUR 6.0 06/21/2021    PHUR 6.0 06/21/2021    WBCUA 0 06/21/2021    RBCUA 3 06/21/2021    CLARITYU Clear 06/21/2021    SPECGRAV 1.019 06/21/2021    LEUKOCYTESUR Negative 06/21/2021    UROBILINOGEN 1.0 06/21/2021    BILIRUBINUR Negative 06/21/2021    BLOODU Negative 06/21/2021    GLUCOSEU Negative 06/21/2021     Microalbumen/Creatinine ratio:  No components found for: RUCREAT  24 Hour Urine for Protein:  No components found for: RAWUPRO, UHRS3, PNDN62MR, UTV3  24 Hour Urine for Creatinine Clearance:  No components found for: CREAT4, UHRS10, UTV10  Urine Toxicology:  No components found for: Natacha Socks, IBENZO, ICOCAINE, IMARTHC, IOPIATES, IPHENCYC    HgBA1c:    Lab Results   Component Value Date    LABA1C 5.8 06/25/2015     RPR:  No results found for: RPR  HIV:  No results found for: HIV  CLARK:    Lab Results   Component Value Date    CLARK POSITIVE 06/22/2021     RF:  No results found for:

## 2021-06-25 VITALS
WEIGHT: 107 LBS | HEART RATE: 89 BPM | TEMPERATURE: 98.1 F | DIASTOLIC BLOOD PRESSURE: 58 MMHG | RESPIRATION RATE: 16 BRPM | HEIGHT: 71 IN | OXYGEN SATURATION: 98 % | SYSTOLIC BLOOD PRESSURE: 107 MMHG | BODY MASS INDEX: 14.98 KG/M2

## 2021-06-25 PROBLEM — N17.9 AKI (ACUTE KIDNEY INJURY) (HCC): Status: RESOLVED | Noted: 2021-06-21 | Resolved: 2021-06-25

## 2021-06-25 LAB
ALBUMIN SERPL-MCNC: 2 G/DL (ref 3.1–4.9)
ALBUMIN SERPL-MCNC: 2.2 G/DL (ref 3.4–5)
ALPHA-1-GLOBULIN: 0.3 G/DL (ref 0.2–0.4)
ALPHA-2-GLOBULIN: 0.6 G/DL (ref 0.4–1.1)
ANION GAP SERPL CALCULATED.3IONS-SCNC: 7 MMOL/L (ref 3–16)
BASOPHILS ABSOLUTE: 0 K/UL (ref 0–0.2)
BASOPHILS RELATIVE PERCENT: 0.8 %
BETA GLOBULIN: 1.2 G/DL (ref 0.9–1.6)
BLOOD CULTURE, ROUTINE: NORMAL
BUN BLDV-MCNC: 5 MG/DL (ref 7–20)
CALCIUM IONIZED: 1.13 MMOL/L (ref 1.12–1.32)
CALCIUM SERPL-MCNC: 8.5 MG/DL (ref 8.3–10.6)
CHLORIDE BLD-SCNC: 100 MMOL/L (ref 99–110)
CO2: 28 MMOL/L (ref 21–32)
CREAT SERPL-MCNC: 1.2 MG/DL (ref 0.9–1.3)
CREATININE URINE: 43.8 MG/DL (ref 39–259)
CULTURE, BLOOD 2: NORMAL
EOSINOPHILS ABSOLUTE: 0.4 K/UL (ref 0–0.6)
EOSINOPHILS RELATIVE PERCENT: 7.1 %
GAMMA GLOBULIN: 1.5 G/DL (ref 0.6–1.8)
GFR AFRICAN AMERICAN: >60
GFR NON-AFRICAN AMERICAN: >60
GLUCOSE BLD-MCNC: 134 MG/DL (ref 70–99)
HAV AB SERPL IA-ACNC: NEGATIVE
HCT VFR BLD CALC: 21.5 % (ref 40.5–52.5)
HEMOGLOBIN: 7.3 G/DL (ref 13.5–17.5)
LYMPHOCYTES ABSOLUTE: 2.8 K/UL (ref 1–5.1)
LYMPHOCYTES RELATIVE PERCENT: 46.9 %
MAGNESIUM URINE: 8.1 MG/DL (ref 4.1–13.8)
MAGNESIUM: 1.4 MG/DL (ref 1.8–2.4)
MCH RBC QN AUTO: 31.5 PG (ref 26–34)
MCHC RBC AUTO-ENTMCNC: 34 G/DL (ref 31–36)
MCV RBC AUTO: 92.9 FL (ref 80–100)
MONOCYTES ABSOLUTE: 0.8 K/UL (ref 0–1.3)
MONOCYTES RELATIVE PERCENT: 13.9 %
NEUTROPHILS ABSOLUTE: 1.9 K/UL (ref 1.7–7.7)
NEUTROPHILS RELATIVE PERCENT: 31.3 %
PDW BLD-RTO: 20.4 % (ref 12.4–15.4)
PH VENOUS: 7.57 (ref 7.35–7.45)
PHOSPHORUS: 3.3 MG/DL (ref 2.5–4.9)
PLATELET # BLD: 91 K/UL (ref 135–450)
PMV BLD AUTO: 7.1 FL (ref 5–10.5)
POTASSIUM SERPL-SCNC: 3.8 MMOL/L (ref 3.5–5.1)
RBC # BLD: 2.31 M/UL (ref 4.2–5.9)
SODIUM BLD-SCNC: 135 MMOL/L (ref 136–145)
SPE/IFE INTERPRETATION: NORMAL
TOTAL PROTEIN: 5.6 G/DL (ref 6.4–8.2)
WBC # BLD: 6 K/UL (ref 4–11)

## 2021-06-25 PROCEDURE — 6360000002 HC RX W HCPCS: Performed by: INTERNAL MEDICINE

## 2021-06-25 PROCEDURE — 88313 SPECIAL STAINS GROUP 2: CPT

## 2021-06-25 PROCEDURE — 88333 PATH CONSLTJ SURG CYTO XM 1: CPT

## 2021-06-25 PROCEDURE — 88305 TISSUE EXAM BY PATHOLOGIST: CPT

## 2021-06-25 PROCEDURE — 36415 COLL VENOUS BLD VENIPUNCTURE: CPT

## 2021-06-25 PROCEDURE — 2500000003 HC RX 250 WO HCPCS: Performed by: INTERNAL MEDICINE

## 2021-06-25 PROCEDURE — 83735 ASSAY OF MAGNESIUM: CPT

## 2021-06-25 PROCEDURE — 07DR3ZX EXTRACTION OF ILIAC BONE MARROW, PERCUTANEOUS APPROACH, DIAGNOSTIC: ICD-10-PCS | Performed by: INTERNAL MEDICINE

## 2021-06-25 PROCEDURE — 94760 N-INVAS EAR/PLS OXIMETRY 1: CPT

## 2021-06-25 PROCEDURE — 6370000000 HC RX 637 (ALT 250 FOR IP): Performed by: INTERNAL MEDICINE

## 2021-06-25 PROCEDURE — 99233 SBSQ HOSP IP/OBS HIGH 50: CPT | Performed by: INTERNAL MEDICINE

## 2021-06-25 PROCEDURE — 88184 FLOWCYTOMETRY/ TC 1 MARKER: CPT

## 2021-06-25 PROCEDURE — 2580000003 HC RX 258

## 2021-06-25 PROCEDURE — 0DJ08ZZ INSPECTION OF UPPER INTESTINAL TRACT, VIA NATURAL OR ARTIFICIAL OPENING ENDOSCOPIC: ICD-10-PCS | Performed by: INTERNAL MEDICINE

## 2021-06-25 PROCEDURE — 88311 DECALCIFY TISSUE: CPT

## 2021-06-25 PROCEDURE — 2580000003 HC RX 258: Performed by: HOSPITALIST

## 2021-06-25 PROCEDURE — 80069 RENAL FUNCTION PANEL: CPT

## 2021-06-25 PROCEDURE — 85025 COMPLETE CBC W/AUTO DIFF WBC: CPT

## 2021-06-25 PROCEDURE — 82330 ASSAY OF CALCIUM: CPT

## 2021-06-25 PROCEDURE — 6370000000 HC RX 637 (ALT 250 FOR IP): Performed by: HOSPITALIST

## 2021-06-25 PROCEDURE — 88185 FLOWCYTOMETRY/TC ADD-ON: CPT

## 2021-06-25 PROCEDURE — 82570 ASSAY OF URINE CREATININE: CPT

## 2021-06-25 PROCEDURE — 88334 PATH CONSLTJ SURG CYTO XM EA: CPT

## 2021-06-25 RX ORDER — IVERMECTIN 3 MG/1
200 TABLET ORAL ONCE
Qty: 3 TABLET | Refills: 0 | Status: SHIPPED | OUTPATIENT
Start: 2021-06-28 | End: 2021-06-28

## 2021-06-25 RX ORDER — CIPROFLOXACIN 500 MG/1
500 TABLET, FILM COATED ORAL 2 TIMES DAILY
Qty: 14 TABLET | Refills: 0 | Status: SHIPPED | OUTPATIENT
Start: 2021-06-25 | End: 2021-07-02

## 2021-06-25 RX ORDER — CIPROFLOXACIN 500 MG/1
500 TABLET, FILM COATED ORAL EVERY 12 HOURS SCHEDULED
Status: DISCONTINUED | OUTPATIENT
Start: 2021-06-25 | End: 2021-06-25 | Stop reason: HOSPADM

## 2021-06-25 RX ORDER — MAGNESIUM SULFATE IN WATER 40 MG/ML
4000 INJECTION, SOLUTION INTRAVENOUS ONCE
Status: COMPLETED | OUTPATIENT
Start: 2021-06-25 | End: 2021-06-25

## 2021-06-25 RX ORDER — LIDOCAINE HYDROCHLORIDE 10 MG/ML
INJECTION, SOLUTION EPIDURAL; INFILTRATION; INTRACAUDAL; PERINEURAL
Status: DISCONTINUED
Start: 2021-06-25 | End: 2021-06-25 | Stop reason: WASHOUT

## 2021-06-25 RX ORDER — METRONIDAZOLE 250 MG/1
500 TABLET ORAL EVERY 8 HOURS SCHEDULED
Status: DISCONTINUED | OUTPATIENT
Start: 2021-06-25 | End: 2021-06-25 | Stop reason: HOSPADM

## 2021-06-25 RX ORDER — METRONIDAZOLE 500 MG/1
500 TABLET ORAL 3 TIMES DAILY
Qty: 21 TABLET | Refills: 0 | Status: SHIPPED | OUTPATIENT
Start: 2021-06-25 | End: 2021-07-02

## 2021-06-25 RX ORDER — SODIUM CHLORIDE 9 MG/ML
INJECTION, SOLUTION INTRAVENOUS
Status: COMPLETED
Start: 2021-06-25 | End: 2021-06-25

## 2021-06-25 RX ORDER — PANTOPRAZOLE SODIUM 40 MG/1
40 TABLET, DELAYED RELEASE ORAL
Qty: 30 TABLET | Refills: 0 | Status: SHIPPED | OUTPATIENT
Start: 2021-06-26

## 2021-06-25 RX ADMIN — MAGNESIUM SULFATE HEPTAHYDRATE 4000 MG: 40 INJECTION, SOLUTION INTRAVENOUS at 12:41

## 2021-06-25 RX ADMIN — THIAMINE HCL TAB 100 MG 100 MG: 100 TAB at 08:52

## 2021-06-25 RX ADMIN — METRONIDAZOLE 500 MG: 500 INJECTION, SOLUTION INTRAVENOUS at 00:38

## 2021-06-25 RX ADMIN — POTASSIUM PHOSPHATE, MONOBASIC 250 MG: 500 TABLET, SOLUBLE ORAL at 08:51

## 2021-06-25 RX ADMIN — MAGNESIUM GLUCONATE 500 MG ORAL TABLET 400 MG: 500 TABLET ORAL at 15:44

## 2021-06-25 RX ADMIN — METRONIDAZOLE 500 MG: 500 INJECTION, SOLUTION INTRAVENOUS at 09:13

## 2021-06-25 RX ADMIN — Medication 5 ML: at 09:12

## 2021-06-25 RX ADMIN — METRONIDAZOLE 500 MG: 250 TABLET ORAL at 15:41

## 2021-06-25 RX ADMIN — PANTOPRAZOLE SODIUM 40 MG: 40 TABLET, DELAYED RELEASE ORAL at 05:43

## 2021-06-25 RX ADMIN — MAGNESIUM GLUCONATE 500 MG ORAL TABLET 400 MG: 500 TABLET ORAL at 08:58

## 2021-06-25 RX ADMIN — SODIUM CHLORIDE 25 ML: 9 INJECTION, SOLUTION INTRAVENOUS at 09:11

## 2021-06-25 RX ADMIN — FOLIC ACID 1 MG: 1 TABLET ORAL at 08:52

## 2021-06-25 RX ADMIN — LEVETIRACETAM 750 MG: 500 TABLET ORAL at 08:51

## 2021-06-25 RX ADMIN — CIPROFLOXACIN 400 MG: 2 INJECTION, SOLUTION INTRAVENOUS at 05:42

## 2021-06-25 RX ADMIN — POTASSIUM PHOSPHATE, MONOBASIC 250 MG: 500 TABLET, SOLUBLE ORAL at 12:40

## 2021-06-25 ASSESSMENT — ENCOUNTER SYMPTOMS
DIARRHEA: 0
RHINORRHEA: 0
EYE DISCHARGE: 0
WHEEZING: 0
ABDOMINAL PAIN: 0
SHORTNESS OF BREATH: 0
CONSTIPATION: 0
COUGH: 0
SORE THROAT: 0
NAUSEA: 0
EYE REDNESS: 0
BACK PAIN: 0
TROUBLE SWALLOWING: 0

## 2021-06-25 ASSESSMENT — PAIN SCALES - GENERAL
PAINLEVEL_OUTOF10: 0

## 2021-06-25 NOTE — PROGRESS NOTES
Gastroenterology Progress Note            Isatu Comer is a 60 y.o. male patient. 1. Acute kidney injury ([facility] [address]    2. Septicemia ([facility] [address]    3. Herpes simplex infection of penis        SUBJECTIVE:  Pt denies abd pain or N/V. No more melena    ROS:  Cardiovascular ROS: no chest pain or dyspnea on exertion  Gastrointestinal ROS: no abdominal pain, change in bowel habits, or black or bloody stools  Respiratory ROS: no cough, shortness of breath, or wheezing    Physical    VITALS:  BP (!) 100/47   Pulse 74   Temp 98 °F (36.7 °C) (Oral)   Resp 16   Ht 5' 11\" (1.803 m)   Wt 107 lb (48.5 kg)   SpO2 98%   BMI 14.92 kg/m²   TEMPERATURE:  Current - Temp: 98 °F (36.7 °C); Max - Temp  Av °F (36.7 °C)  Min: 97.2 °F (36.2 °C)  Max: 99 °F (37.2 °C)    NAD  RRR, Nl s1s2  Lungs CTA Bilaterally, normal effort  Abdomen soft, ND, NT, no HSM, Bowel sounds normal  AAOx3, No asterixis     Data    CBC:   Lab Results   Component Value Date    WBC 6.0 2021    RBC 2.31 2021    HGB 7.3 2021    HCT 21.5 2021    MCV 92.9 2021    MCH 31.5 2021    MCHC 34.0 2021    RDW 20.4 2021    PLT 91 2021    MPV 7.1 2021     Hepatic Function Panel:    Lab Results   Component Value Date    ALKPHOS 86 2021    ALT 24 2021    AST 44 2021    PROT 5.9 2021    BILITOT 0.7 2021    BILIDIR 0.4 2021    IBILI 0.3 2021           ASSESSMENT     GI bleed due to PUD. No more melena. Hg this am unchanged from yest. Hp status pending    Cirrhosis: new diagnosis. Etiology unclear. Liver specific studies pending. No signs of decompensation    TCP: likely due to cirrhosis.  BM Bx done today      PLAN      Ok from GI standpoint for d/c  pantoprazole 40mg a day  Pt to call Monday for path if d/c today  Pt asked to make an office visit for f/u of cirrhosis - card given  Stressed importance of avoiding NSAIDS    Madiha Hernandez MD

## 2021-06-25 NOTE — PLAN OF CARE
Problem: Pain:  Goal: Pain level will decrease  Description: Pain level will decrease  Outcome: Ongoing  Goal: Control of acute pain  Description: Control of acute pain  Outcome: Ongoing  Goal: Control of chronic pain  Description: Control of chronic pain  Outcome: Ongoing     Problem: Nutrition  Goal: Optimal nutrition therapy  6/24/2021 2152 by Ramona Ponce RN  Outcome: Ongoing  6/24/2021 1157 by Rashaun Fuentes RD, LD  Outcome: Ongoing     Problem: Falls - Risk of:  Goal: Will remain free from falls  Description: Will remain free from falls  Outcome: Ongoing  Goal: Absence of physical injury  Description: Absence of physical injury  Outcome: Ongoing

## 2021-06-25 NOTE — DISCHARGE SUMMARY
[facility] DISCHARGE SUMMARY    Patient Demographics    Patient. Michelle Cardoso  Date of Birth. 1965  MRN. [id number]     Primary care provider. No primary care provider on file. (Tel: None)    Admit date: 6/21/2021    Discharge date (blank if same as Note Date): Note Date: 6/25/2021     Reason for Hospitalization. Chief Complaint   Patient presents with    Nausea     x2 days    Emesis     x2 days    Diarrhea     x2 days    Rash     on groin         Significant Findings. Active Problems:    Alcohol cessation counseling    Cirrhosis of liver without ascites (HCC)    Erosive esophagitis    Portal hypertensive gastropathy (HCC)    Cryptosporidial gastroenteritis ([address]    Giardiasis    Need for hepatitis A vaccination  Resolved Problems:    CATALINO (acute kidney injury) ([address]    Enterocolitis       Problems and results from this hospitalization that need follow up. 1. Cirrhosis, work-up pending  2. Anemia, thrombocytopenia -bone marrow biopsy results pending    Significant test results and incidental findings. US GALLBLADDER RUQ   Final Result   No echogenic stones are seen however the gallbladder wall is thickened with   pericholecystic fluid or edema. Primary consideration is hepatocellular   dysfunction such as from cirrhosis or hepatitis. Possibility of acalculous   cholecystitis is considered less likely. However there is no significant   probe tenderness on focal compression. Trace ascites and cirrhotic morphology of the liver. CT ABDOMEN PELVIS W IV CONTRAST Additional Contrast? None   Final Result   Mild wall thickening involving the small bowel and ascending colon with mild   adjacent inflammatory changes. Findings concerning for enterocolitis. Gallbladder wall is hyperenhancing and thickened with mild adjacent   inflammatory stranding.   While the gallbladder wall thickening may be related   to the patient's liver disease, acute cholecystitis is not excluded. Clinical correlation recommended. If indicated, right upper quadrant   ultrasound may be helpful for further evaluation. Cirrhosis with trace ascites. CT CHEST WO CONTRAST   Final Result   1. No acute airspace abnormality. No evidence of underlying pneumonia. 2. Moderate centrilobular emphysema and chronic interstitial lung disease   with overall pattern favoring UIP. XR CHEST PORTABLE   Final Result   Subtle patchy hazy opacities noted in the bilateral upper lungs which may   reflect airspace disease in the correct clinical setting. Possible spiculated nodule noted in the left upper lung. Further evaluation   with nonemergent chest CT is recommended. Invasive procedures and treatments. 1. EGD performed on 6/24/2021 showed erosive esophagitis  2. Bone marrow biopsy performed on 6/25/2021 for anemia and thrombocytopenia. Results pending    [facility] Course. Biju Dumont a 60 y. o. male  with history of alcohol abuse, cancer, seizure disorder, who presented with with complaints of nausea, vomiting and rash in the groin area onset of symptoms about 2 days ago. Labs showed acute kidney injury. Nausea, vomiting and diarrhea: Stool tested positive for Cryptosporidium and giardiasis. Hep panel negative. As per ID service, no indication to treat Cryptosporidium. Giardiasis covered with Flagyl. CATALINO: Likely prerenal.  Resolved with IV fluids. Abnormal LFTs, Cirrhosis: W/u for Cirrhosis pending at the time of discharge. Can be followed up as outpt as per GI. Acute cholecystitis:  As noted on Ct abdomen. RUQ ultrasound with no acute findings. GI consulted. No need for further w/u.   GI bleed, black tarry stools s/p EGD: Hb level trended down since admission. EGD performed on 6/24 showed erosive esophagitis without evidence of active bleed. Patient discharged on Protonix p.o. daily as per GI recommendations.   Gastric Peripheral Pulses: +2 palpable, equal bilaterally     Condition at time of discharge stable    Medication instructions provided to patient at discharge. Medication List      START taking these medications    ciprofloxacin 500 MG tablet  Commonly known as: CIPRO  Take 1 tablet by mouth 2 times daily for 7 days     ivermectin 3 MG tablet  Take 3 tablets by mouth once for 1 dose  Start taking on: June 28, 2021     metroNIDAZOLE 500 MG tablet  Commonly known as: FLAGYL  Take 1 tablet by mouth 3 times daily for 7 days        CONTINUE taking these medications    Dilantin 100 MG ER capsule  Generic drug: phenytoin     folic acid 1 MG tablet  Commonly known as: FOLVITE  Take 1 tablet by mouth daily     levETIRAcetam 750 MG tablet  Commonly known as: Keppra  Take 1 tablet by mouth 2 times daily     magnesium oxide 400 (240 Mg) MG tablet  Commonly known as: MAG-OX  Take 1 tablet by mouth 2 times daily     multivitamin tablet  Take 1 tablet by mouth daily     potassium chloride 20 MEQ extended release tablet  Commonly known as: KLOR-CON M  Take 2 tablets by mouth 2 times daily (with meals)     risperiDONE 1 MG tablet  Commonly known as: RISPERDAL     thiamine 100 MG tablet  Take 1 tablet by mouth daily           Where to Get Your Medications      These medications were sent to [address], [address] [place] [address] Road    Phone: [phone]   · ciprofloxacin 500 MG tablet  · ivermectin 3 MG tablet  · metroNIDAZOLE 500 MG tablet         Discharge recommendations given to patient. Follow Up. PCP in 1 week, GI and hematology clinic in 1-2 weeks   Disposition. home  Activity. activity as tolerated  Diet: ADULT DIET; Regular; No Added Salt (3-4 gm); Low Fiber  Adult Oral Nutrition Supplement; Standard High Calorie/High Protein Oral Supplement      Spent 50 minutes in discharge process.     Signed:  Zen Campos MD     6/25/2021 1:40 PM

## 2021-06-25 NOTE — DISCHARGE INSTR - COC
Continuity of Care Form    Patient Name: Nini Roger   :  1965  MRN:  [id number]    Admit date:  2021  Discharge date:  ***    Code Status Order: Full Code   Advance Directives:   Advance Care Flowsheet Documentation     Date/Time Healthcare Directive Type of Healthcare Directive Copy in [address] Agent's Name Healthcare Agent's Phone Number    21 0599  No, patient does not have an advance directive for healthcare treatment -- -- -- -- --    21  No, patient does not have an advance directive for healthcare treatment -- -- -- -- --          Admitting Physician:  Kj Lorenzo MD  PCP: No primary care provider on file. Discharging Nurse: [facility] Unit/Room#: [id number]-[id number]/0947-91  Discharging Unit Phone Number: ***    Emergency Contact:   Extended Emergency Contact Information  Primary Emergency Contact: Dmitriy Spears  Address: -CAREGIVER AT [address] [place] [address] Phone: [phone]  Relation: Other  Secondary Emergency Contact: Rossy Beasley  [place] Phone: [phone]  Relation: Parent    Past Surgical History:  Past Surgical History:   Procedure Laterality Date    UPPER GASTROINTESTINAL ENDOSCOPY N/A 2021    EGD GASTRIC BIOPSY R/O HPYLORI performed by Yossi Fuentes MD at [address] [facility] ENDOSCOPY       Immunization History: There is no immunization history for the selected administration types on file for this patient.     Active Problems:  Patient Active Problem List   Diagnosis Code    Partial symptomatic epilepsy with complex partial seizures, intractable, without status epilepticus ([address] G40.219    Noncompliance with medication regimen Z91.14    Seizures ([address] R56.9    Alcohol abuse F10.10    Pneumonia J18.9    Nausea and vomiting R11.2    Breakthrough seizure ([address] G40.919    Acute febrile illness R50.9    Sepsis ([address] A41.9    Lactic acid acidosis E87.2    Smoker F17.200    Cerebrovascular small vessel disease I67.9  Streptococcal bacteremia R78.81, B95.5    Streptococcus viridans infection A49.1    Alcohol cessation counseling Z71.41    Tobacco abuse counseling Z71.6    Herpes simplex infection of penis A60.01    Hypokalemia E87.6    Hypomagnesemia E83.42    Elevated bilirubin R17    Transaminitis R74.01    Marijuana abuse F12.10    Subcutaneous emphysema resulting from a procedure T81.82XA    Emaciated ([address] E43    Cirrhosis of liver without ascites (HCC) K74.60    Erosive esophagitis K22.10    Portal hypertensive gastropathy (HCC) K76.6, K31.89    Cryptosporidial gastroenteritis (HCC) A07.2    Giardiasis A07.1    Need for hepatitis A vaccination Z23       Isolation/Infection:   Isolation          Contact        Patient Infection Status     Infection Onset Added Last Indicated Last Indicated By Review Planned Expiration Resolved Resolved By    Cryptosporidiosis 06/23/21 06/24/21 06/23/21 O&P SCREEN(CRYPTOSPORIDIUM/GIARDIA/E. HISTOLYTICA) #1 06/26/21       Giardiasis 06/23/21 06/24/21 06/23/21 O&P SCREEN(CRYPTOSPORIDIUM/GIARDIA/E. HISTOLYTICA) #1 06/26/21       Resolved    C-diff Rule Out 06/21/21 06/21/21 06/23/21 GI Bacterial Pathogens By PCR (Ordered)   06/23/21 Richie Pinzon RN    COVID-19 Rule Out 12/30/20 12/30/20 12/31/20 Respiratory Panel, Molecular, with COVID-19 (Restricted: peds pts or suitable admitted adults) (Ordered)   01/01/21 Rule-Out Test Resulted    COVID-19 Rule Out 12/30/20 12/30/20 12/30/20 COVID-19 (Ordered)   12/30/20 Rule-Out Test Resulted          Nurse Assessment:  Last Vital Signs: BP (!) 107/58   Pulse 89   Temp 98.1 °F (36.7 °C) (Oral)   Resp 16   Ht 5' 11\" (1.803 m)   Wt 107 lb (48.5 kg)   SpO2 98%   BMI 14.92 kg/m²     Last documented pain score (0-10 scale): Pain Level: 0  Last Weight:   Wt Readings from Last 1 Encounters:   06/21/21 107 lb (48.5 kg)     Mental Status:  {IP PT MENTAL STATUS:20030}    IV Access:  {[facility] IV ACCESS:281123455}    Nursing Date: ***    Readmission Risk Assessment Score:  Readmission Risk              Risk of Unplanned Readmission:  30           Discharging to Facility/ Agency   · Name:   · Address:  · Phone:  · Fax:    Dialysis Facility (if applicable)   · Name:  · Address:  · Dialysis Schedule:  · Phone:  · Fax:    / signature: {Esignature:062223752}    PHYSICIAN SECTION    Prognosis: {Prognosis:3803413565}    Condition at Discharge: [address] [place] [address] Patient Condition:474235248}    Rehab Potential (if transferring to Rehab): {Prognosis:3464495606}    Recommended Labs or Other Treatments After Discharge: ***    Physician Certification: I certify the above information and transfer of Emily Search  is necessary for the continuing treatment of the diagnosis listed and that he requires {Admit to Appropriate Level of Care:92345} for {GREATER/LESS:287062624} 30 days.      Update Admission H&P: {CHP DME Changes in DSCTQ:140678813}    PHYSICIAN SIGNATURE:  {Esignature:172840797}

## 2021-06-25 NOTE — PROGRESS NOTES
CLINICAL PHARMACY NOTE: MEDS TO BEDS    Total # of Prescriptions Filled: 4   The following medications were delivered to the patient:  · Ivermectin 3mg  · Pantoprazole 40mg  · Ciprofloxacin 500mg  · Metronidazole 500mg    Additional Documentation:  Medications delivered- patient signed  Maureen Oakley

## 2021-06-25 NOTE — PROGRESS NOTES
Pt getting mag replacement through IV at this time. Still going to run for 3 more hours then will do a re-check of mag prior to leaving hospital.     Urine sample collected and sent to lab at this time.

## 2021-06-25 NOTE — PROGRESS NOTES
MD Shelia Miranda MD Dillon Dunnings, MD               Office: [phone]                      Fax: [phone]             [facility]                   NEPHROLOGY INPATIENT PROGRESS NOTE:     PATIENT NAME: Lena Posey  : 1965  MRN: [id number]       RECOMMENDATIONS:   -no need for IVF from renal POV  -added K-Phos    - continue   - low Mag again (decrease intake + likely poor GI absportion w/ PPI, unlikely renal wasting    - check Urine studies    - Give more IV 3g     - increased PO BID to TID   -GI bleed due to PUD, GI following, pantoprazole, (as history of cirrhosis, new diagnosis, not decompensated  -Pancytopenia,-bone marrow biopsy done -hematology following,  -Penile rash-HSV management per 1' team     D/C plan from renal stand point: Stable     Have D/W patient, hospitalist, nurse       IMPRESSION:       Admitted for:  CATALINO (acute kidney injury) ([facility] [N17.9]      CATALINO (on no CKD:): better  - BL Scr- 0.7-0.8 (till 2021 ---> 2.1 on admission  -: Etiology of CATALINO - pre-renal + Early ATN w/ hypovolemia    - other differentials: r/o obstruction,   - UA - bland, clear, so unlikely GN / TI / TMA process  - urine lytes = hypovolemia       Associated problems:   - Volume status: mild hypo-volemic  : BP: lower -  No need for tight control  : Na: hyponatremia - mild - follow as likely due to lower solute intake     - Azotemia: severe 71 : ?pre-renal, no steroids, ? UGIB w/ lower Hb    - Electrolytes: K: hypokalemia - mild   HypoMagnesemia - mild     - Lactic acidosis -?hypoperfusion+ CATALINO + starvation - alcohol ketosis    - Anemia: mild - follow        Other major problems: Management per primary and other consulting teams.      U tox (+) for Cannabinoids     Acute cholecystitis    Liver cirrhosis,   GI bleed, peptic ulcer disease  Anemia, thrombocytopenia    Stool positive for Giardia and cryptosporidia      Hospital Problems         Last Modified POA    Drug abuse counseling and surveillance of drug abuser 6/24/2021 Yes    CATALINO (acute kidney injury) ([facility] 6/24/2021 Yes    Enterocolitis 6/22/2021 Yes    Cirrhosis of liver without ascites ([facility] 6/24/2021 Yes    Erosive esophagitis 6/24/2021 Yes    Portal hypertensive gastropathy ([facility] 6/24/2021 Yes             Please refer to the orders. High Complexity. Multiple complex problems. Discussed with patient and treatment team-    Time spent > 30 (~35) minutes. Thank you for allowing me to participate in this patient's care. Please do not hesitate to contact me anytime. We will follow along with you. Lorelei Wilson MD,  [facility] [address] [place] [address] [place]: [phone] or [address]  Fax: [phone]        ========================================================   ========================================================   SUBJECTIVE:  Patient was seen comfortably sitting up in the bed,   Reported no active complaints,   Renal function slightly worse. Past medical, Surgical, Social, Family medical history reviewed by me. MEDICATIONS: reviewed by me. Medications Prior to Admission:  No current facility-administered medications on file prior to encounter.      Current Outpatient Medications on File Prior to Encounter   Medication Sig Dispense Refill    phenytoin (DILANTIN) 100 MG ER capsule Take 250 mg by mouth 2 times daily      levETIRAcetam (KEPPRA) 750 MG tablet Take 1 tablet by mouth 2 times daily 60 tablet 3    risperiDONE (RISPERDAL) 1 MG tablet Take 1 mg by mouth nightly      folic acid (FOLVITE) 1 MG tablet Take 1 tablet by mouth daily 30 tablet 3    Multiple Vitamin (MULTIVITAMIN) tablet Take 1 tablet by mouth daily 30 tablet 3    vitamin B-1 100 MG tablet Take 1 tablet by mouth daily 30 tablet 3    magnesium oxide (MAG-OX) 400 (240 Mg) MG tablet Take 1 tablet by mouth 2 times daily 30 tablet 1    potassium chloride (KLOR-CON M) 20 MEQ extended release tablet Take 2 tablets by mouth 2 times daily (with meals) 60 tablet 0         Current Facility-Administered Medications:     0.9 % sodium chloride infusion, , Intravenous, Continuous, Connie Garza MD, Stopped at 06/24/21 1015    magnesium oxide (MAG-OX) tablet 400 mg, 400 mg, Oral, TID, Moriah Mayer MD, 400 mg at 06/25/21 0858    pantoprazole (PROTONIX) tablet 40 mg, 40 mg, Oral, QAM AC, Darian Charles MD, 40 mg at 06/25/21 0543    potassium chloride (KLOR-CON M) extended release tablet 40 mEq, 40 mEq, Oral, PRN **OR** potassium bicarb-citric acid (EFFER-K) effervescent tablet 40 mEq, 40 mEq, Oral, PRN **OR** potassium chloride 10 mEq/100 mL IVPB (Peripheral Line), 10 mEq, Intravenous, PRN, Dilshad Maciel MD    potassium phosphate (monobasic) (K-PHOS) tablet 250 mg, 250 mg, Oral, TID WC, Moriah Mayer MD, 250 mg at 06/25/21 0851    magnesium sulfate 1000 mg in dextrose 5% 100 mL IVPB, 1,000 mg, Intravenous, PRN, Dilshad Maciel MD, Stopped at 06/24/21 1050    ciprofloxacin (CIPRO) IVPB 400 mg, 400 mg, Intravenous, Q12H, Prince Bryan MD, Stopped at 06/25/21 0644    metronidazole (FLAGYL) 500 mg in NaCl 100 mL IVPB premix, 500 mg, Intravenous, Q8H, Prince Bryan MD, Last Rate: 100 mL/hr at 06/25/21 0913, 500 mg at 95/26/58 7534    folic acid (FOLVITE) tablet 1 mg, 1 mg, Oral, Daily, Toni Hairston MD, 1 mg at 06/25/21 0852    levETIRAcetam (KEPPRA) tablet 750 mg, 750 mg, Oral, BID, Toni Hairston MD, 750 mg at 06/25/21 0851    risperiDONE (RISPERDAL) tablet 1 mg, 1 mg, Oral, Nightly, Toni Hairston MD, 1 mg at 06/24/21 2118    thiamine mononitrate tablet 100 mg, 100 mg, Oral, Daily, Toni Hairston MD, 100 mg at 06/25/21 8404    sodium chloride flush 0.9 % injection 5-40 mL, 5-40 mL, Intravenous, 2 times per day, Tamera Tim MD, 5 mL at 06/25/21 0912    sodium chloride flush 0.9 % injection 5-40 mL, 5-40 mL, Intravenous, PRN, Toni Hairston MD    0.9 % sodium chloride infusion, 25 mL, Intravenous, PRN, Yanira Nova MD, Last Rate: 10 mL/hr at 06/25/21 0911, 25 mL at 06/25/21 0911    ondansetron (ZOFRAN-ODT) disintegrating tablet 4 mg, 4 mg, Oral, Q8H PRN **OR** ondansetron (ZOFRAN) injection 4 mg, 4 mg, Intravenous, Q6H PRN, Yanira Nova MD    polyethylene glycol (GLYCOLAX) packet 17 g, 17 g, Oral, Daily PRN, Yanira Nova MD    acetaminophen (TYLENOL) tablet 650 mg, 650 mg, Oral, Q6H PRN, 650 mg at 06/22/21 2008 **OR** acetaminophen (TYLENOL) suppository 650 mg, 650 mg, Rectal, Q6H PRN, Yanira Nova MD    REVIEW OF SYSTEMS:  As mentioned in chief complaint and HPI , Subjective    PHYSICAL EXAM:  Recent vital signs and recent I/Os reviewed by me. Wt Readings from Last 3 Encounters:   06/21/21 107 lb (48.5 kg)   01/04/21 116 lb 2.9 oz (52.7 kg)   12/27/19 150 lb (68 kg)     BP Readings from Last 3 Encounters:   06/25/21 (!) 100/47   06/24/21 (!) 94/52   01/05/21 109/71     Patient Vitals for the past 24 hrs:   BP Temp Temp src Pulse Resp SpO2 Height   06/25/21 0829 (!) 100/47 98 °F (36.7 °C) Oral 74 16 98 % --   06/25/21 0545 (!) 102/46 -- -- -- -- -- --   06/25/21 0539 (!) 95/47 98.4 °F (36.9 °C) Oral 90 16 98 % --   06/25/21 0036 (!) 95/56 99 °F (37.2 °C) Oral 92 16 97 % --   06/24/21 2112 (!) 99/56 99 °F (37.2 °C) Oral 85 16 98 % --   06/24/21 1634 114/64 97.9 °F (36.6 °C) Oral 73 16 99 % --   06/24/21 1154 -- -- -- -- -- -- 5' 11\" (1.803 m)   06/24/21 1037 103/62 97.5 °F (36.4 °C) Oral 86 16 96 % --   06/24/21 1015 111/68 97.2 °F (36.2 °C) Temporal 85 13 100 % --   06/24/21 1005 119/72 97.3 °F (36.3 °C) Temporal 95 16 100 % --   06/24/21 1000 114/69 -- -- 101 17 96 % --       Intake/Output Summary (Last 24 hours) at 6/25/2021 0958  Last data filed at 6/25/2021 9807  Gross per 24 hour   Intake 1910.53 ml   Output 600 ml   Net 1310.53 ml         Physical Exam  Vitals reviewed. Constitutional:       General: He is not in acute distress. Appearance: He is underweight. He is ill-appearing.    HENT: Head: Normocephalic and atraumatic. Right Ear: External ear normal.      Left Ear: External ear normal.      Nose: Nose normal.      Mouth/Throat:      Mouth: Mucous membranes are not dry. Eyes:      General: No scleral icterus. Conjunctiva/sclera: Conjunctivae normal.   Neck:      Vascular: No JVD. Cardiovascular:      Rate and Rhythm: Normal rate and regular rhythm. Heart sounds: S1 normal and S2 normal.   Pulmonary:      Effort: Pulmonary effort is normal. No respiratory distress. Breath sounds: Normal breath sounds. Abdominal:      General: Bowel sounds are normal.      Palpations: Abdomen is soft. Musculoskeletal:         General: No swelling or deformity. Cervical back: Normal range of motion and neck supple. Skin:     General: Skin is warm and dry. Neurological:      Mental Status: He is alert and oriented to person, place, and time. Mental status is at baseline. Psychiatric:         Mood and Affect: Mood normal.         Behavior: Behavior normal.         DATA:  Diagnostic tests reviewed by me for today's visit:   (AS NEEDED FOR MY EVALUATION AND MANAGEMENT). Recent Labs     06/23/21  0623 06/24/21  0532 06/25/21  0704   WBC 6.5 6.1 6.0   HCT 21.0* 22.9* 21.5*   PLT 63* 75* 91*     Iron Saturation:  No components found for: PERCENTFE  FERRITIN:    Lab Results   Component Value Date    FERRITIN 491.7 06/22/2021     IRON:    Lab Results   Component Value Date    IRON 49 06/22/2021     TIBC:    Lab Results   Component Value Date    TIBC 172 06/22/2021       Recent Labs     06/23/21  0623 06/24/21  0532 06/25/21  0704   * 136 135*   K 3.4* 3.5 3.8   CL 99 100 100   CO2 31 30 28   BUN 15 7 5*   CREATININE 1.0 0.9 1.2     Recent Labs     06/23/21  0623 06/24/21  0532 06/25/21  0704   CALCIUM 8.4 8.4 8.5   MG 1.80 1.40* 1.40*   PHOS 1.9* 3.0 3.3     No results for input(s): PH, PCO2, PO2 in the last 72 hours.     Invalid input(s): L2NRSOTFFYNV, INSPIREDO2    ABG:  No results found for: PH, PCO2, PO2, HCO3, BE, THGB, TCO2, O2SAT  VBG:    Lab Results   Component Value Date    PHVEN 7.570 06/25/2021       LDH:  No results found for: LDH  Uric Acid:    Lab Results   Component Value Date    LABURIC 10.6 06/22/2021       PT/INR:    Lab Results   Component Value Date    PROTIME 12.0 05/16/2018    INR 1.06 05/16/2018     Warfarin PT/INR:  No components found for: PTPATWAR, PTINRWAR  PTT:  No results found for: APTT, PTT[APTT}  Last 3 Troponin:    Lab Results   Component Value Date    TROPONINI <0.01 06/21/2021    TROPONINI <0.01 12/29/2020    TROPONINI <0.01 05/15/2018       U/A:    Lab Results   Component Value Date    COLORU YELLOW 06/21/2021    PROTEINU Negative 06/21/2021    PHUR 6.0 06/21/2021    PHUR 6.0 06/21/2021    WBCUA 0 06/21/2021    RBCUA 3 06/21/2021    CLARITYU Clear 06/21/2021    SPECGRAV 1.019 06/21/2021    LEUKOCYTESUR Negative 06/21/2021    UROBILINOGEN 1.0 06/21/2021    BILIRUBINUR Negative 06/21/2021    BLOODU Negative 06/21/2021    GLUCOSEU Negative 06/21/2021     Microalbumen/Creatinine ratio:  No components found for: RUCREAT  24 Hour Urine for Protein:  No components found for: RAWUPRO, UHRS3, PHRA18SA, UTV3  24 Hour Urine for Creatinine Clearance:  No components found for: CREAT4, UHRS10, UTV10  Urine Toxicology:  No components found for: Lynford Rust, IBENZO, ICOCAINE, IMARTHC, IOPIATES, IPHENCYC    HgBA1c:    Lab Results   Component Value Date    LABA1C 5.8 06/25/2015     RPR:  No results found for: RPR  HIV:  No results found for: HIV  CLARK:    Lab Results   Component Value Date    CLARK POSITIVE 06/22/2021     RF:  No results found for: RF  DSDNA:  No components found for: DNA  AMYLASE:  No results found for: AMYLASE  LIPASE:    Lab Results   Component Value Date    LIPASE 52.0 12/29/2020     Fibrinogen Level:  No components found for: FIB       BELOW MENTIONED RADIOLOGY STUDY RESULTS BY ME (AS NEEDED FOR MY EVALUATION AND MANAGEMENT).      CT CHEST WO cardiomediastinal silhouette is within normal limits. PLEURA/LUNGS: Subtle patchy hazy opacities noted in the bilateral upper lungs which may reflect airspace disease in the correct clinical setting. Possible spiculated opacity noted in the left upper lung. There are no pleural effusions. There is no appreciable pneumothorax. BONES/SOFT TISSUE: No acute abnormality. Subtle patchy hazy opacities noted in the bilateral upper lungs which may reflect airspace disease in the correct clinical setting. Possible spiculated nodule noted in the left upper lung. Further evaluation with nonemergent chest CT is recommended.

## 2021-06-25 NOTE — PROGRESS NOTES
Infectious Diseases   Progress Note      Admission Date: 6/21/2021  Hospital Day: Hospital Day: 5   Attending: Manoj De La Vega MD  Date of service: 6/25/2021     Chief complaint/ Reason for consult:     · Penile rash - *scabies versus bacterial infection  · Hypokalemia  · Hypomagnesemia  · Acute kidney injury with elevated serum creatinine of 2.1  · Transaminitis with elevated AST of 56   · Elevated total bilirubin of 1.5  · History of streptococcal bacteremia in January 2021    Microbiology:        I have reviewed allavailable micro lab data and cultures    · Blood culture (2/2) - collected on 6/21/2021: In process        Antibiotics and immunizations:       Current antibiotics: All antibiotics and their doses were reviewed by me    Recent Abx Admin                   metronidazole (FLAGYL) 500 mg in NaCl 100 mL IVPB premix (mg) 500 mg New Bag 06/25/21 0913     500 mg New Bag  0038     500 mg New Bag 06/24/21 1636    ciprofloxacin (CIPRO) IVPB 400 mg (mg) 400 mg New Bag 06/25/21 0542     400 mg New Bag 06/24/21 1637                  Immunization History: All immunization history was reviewed by me today. There is no immunization history for the selected administration types on file for this patient. Known drug allergies: All allergies were reviewed and updated    Allergies   Allergen Reactions    Carbamazepine Other (See Comments)     Thrombocytopenia    Codeine Itching     Per ER    Phenytoin Itching    Sulfa Antibiotics      Per ER       Social history:     Social History:  All social andepidemiologic history was reviewed and updated by me today as needed. · Tobacco use:   reports that he has been smoking. He has a 40.00 pack-year smoking history. He has never used smokeless tobacco.  · Alcohol use:   reports current alcohol use of about 1.0 standard drinks of alcohol per week. · Currently lives in: [place] [address]  ·  reports current drug use. Drug: Marijuana.      COVID VACCINATION AND were addressed in a satisfactory manner and patient verbalized understanding all instructions. Fluoroquinolone related instructions:     Patient instructed to watch for low or high blood sugars, muscle pains and ankle tendon pain while on Ciprofloxacin or Levofloxacin. Patient was advised to keep a sugar candy at all times as all fluoroquinolones have the potential of causing hypoglycemia. If these symptoms develops, patient was instructed to stop the antibiotic and call my office at [phone]. Use sunscreen when going in bright sun while on Ciprofloxacin or Levofloxacin as these antibiotics can cause photosensitivity. Take 1 hour before or 2 hour after dairy, calcium, iron, magnesium, aluminum or zinc.      TIME SPENT TODAY:     - Spent over  36 minutes on visit (including interval history, physical exam, review of data including labs, cultures, imaging, development and implementation of treatment plan and coordination of complex care). More than 50 percent of this includes face-to-face time spent with the patient for counseling and coordination of care. Thank you for involving me in the care of your patient. I will continue to follow. If you have anyadditional questions, please do not hesitate to contact me. Subjective: Interval history: Interval history was obtained from chart review and patient/ RN. The patient is afebrile. He is tolerating antibiotics okay. Penile rash improving     REVIEW OF SYSTEMS:      Review of Systems   Constitutional: Negative for chills, diaphoresis and fever. HENT: Negative for ear discharge, ear pain, rhinorrhea, sore throat and trouble swallowing. Eyes: Negative for discharge and redness. Respiratory: Negative for cough, shortness of breath and wheezing. Cardiovascular: Negative for chest pain and leg swelling. Gastrointestinal: Negative for abdominal pain, constipation, diarrhea and nausea. Endocrine: Negative for polyuria.    Genitourinary: Negative for dysuria, flank pain, frequency, hematuria and urgency. Musculoskeletal: Negative for back pain and myalgias. Skin: Positive for rash (penile rash improving). Neurological: Negative for dizziness, seizures and headaches. Hematological: Does not bruise/bleed easily. Psychiatric/Behavioral: Negative for hallucinations and suicidal ideas. All other systems reviewed and are negative. Past Medical History: All past medical history reviewed today. Past Medical History:   Diagnosis Date    Alcohol abuse     Cancer ([facility] [address]     Seizures ([facility] [address]        Past Surgical History: All past surgical history was reviewed today. Past Surgical History:   Procedure Laterality Date    UPPER GASTROINTESTINAL ENDOSCOPY N/A 6/24/2021    EGD GASTRIC BIOPSY R/O HPYLORI performed by Gabo Conklin MD at [address]       Family History: All family history was reviewed today. History reviewed. No pertinent family history. Objective:       PHYSICAL EXAM:      Vitals:   Vitals:    06/25/21 0539 06/25/21 0545 06/25/21 0829 06/25/21 1120   BP: (!) 95/47 (!) 102/46 (!) 100/47 (!) 107/58   Pulse: 90  74 89   Resp: 16  16 16   Temp: 98.4 °F (36.9 °C)  98 °F (36.7 °C) 98.1 °F (36.7 °C)   TempSrc: Oral  Oral Oral   SpO2: 98%  98% 98%   Weight:       Height:           Physical Exam  Vitals and nursing note reviewed. Constitutional:       Appearance: Normal appearance. He is well-developed. HENT:      Head: Normocephalic and atraumatic. Right Ear: External ear normal.      Left Ear: External ear normal.      Nose: Nose normal. No congestion or rhinorrhea. Mouth/Throat:      Mouth: Mucous membranes are moist.      Pharynx: No oropharyngeal exudate or posterior oropharyngeal erythema. Eyes:      General: No scleral icterus. Right eye: No discharge. Left eye: No discharge. Conjunctiva/sclera: Conjunctivae normal.      Pupils: Pupils are equal, round, and reactive to light. Cardiovascular:      Rate and Rhythm: Normal rate and regular rhythm. Pulses: Normal pulses. Heart sounds: No murmur heard. No friction rub. Pulmonary:      Effort: Pulmonary effort is normal. No respiratory distress. Breath sounds: Normal breath sounds. No stridor. No wheezing, rhonchi or rales. Abdominal:      General: Bowel sounds are normal.      Palpations: Abdomen is soft. Tenderness: There is no abdominal tenderness. There is no right CVA tenderness, left CVA tenderness, guarding or rebound. Musculoskeletal:         General: No swelling or tenderness. Normal range of motion. Cervical back: Normal range of motion and neck supple. No rigidity. No muscular tenderness. Lymphadenopathy:      Cervical: No cervical adenopathy. Skin:     General: Skin is warm and dry. Coloration: Skin is not jaundiced. Findings: Rash (improving penile rash) present. No erythema. Neurological:      General: No focal deficit present. Mental Status: He is alert and oriented to person, place, and time. Mental status is at baseline. Motor: No abnormal muscle tone. Psychiatric:         Mood and Affect: Mood normal.         Behavior: Behavior normal.         Thought Content: Thought content normal.               Lines: All vascular access sites are healthy with no local erythema, discharge or tenderness. Intake and output:    I/O last 3 completed shifts: In: 2310.5 [P.O.:480; I.V.:400; IV Piggyback:1430.5]  Out: 600 [Urine:600]    Lab Data:   All available labs and old records have been reviewed by me.     CBC:  Recent Labs     06/23/21  0623 06/24/21  0532 06/25/21  0704   WBC 6.5 6.1 6.0   RBC 2.33* 2.51* 2.31*   HGB 7.2* 7.8* 7.3*   HCT 21.0* 22.9* 21.5*   PLT 63* 75* 91*   MCV 90.3 91.2 92.9   MCH 31.1 31.1 31.5   MCHC 34.4 34.0 34.0   RDW 19.6* 20.0* 20.4*        BMP:  Recent Labs     06/23/21  0623 06/24/21  0532 06/25/21  0704   * 136 135*   K 3.4* 3.5 3.8   CL Hypokalemia E87.6    Hypomagnesemia E83.42    Elevated bilirubin R17    Transaminitis R74.01    Marijuana abuse F12.10    Subcutaneous emphysema resulting from a procedure T81.82XA    Emaciated (HCC) E43    Enterocolitis K52.9    Cirrhosis of liver without ascites (HCC) K74.60    Erosive esophagitis K22.10    Portal hypertensive gastropathy (HCC) K76.6, K31.89    Cryptosporidial gastroenteritis (HCC) A07.2    Giardiasis A07.1    Need for hepatitis A vaccination Z23       Please note that this chart was generated using Dragon dictation software. Although every effort was made to ensure the accuracy of this automated transcription, some errors in transcription may have occurred inadvertently. If you may need any clarification, please do not hesitate to contact me through EPIC or at the phone number provided below with my electronic signature. Any pictures or media included in this note were obtained after taking informed verbal consent from the patient and with their approval to include those in the patient's medical record.     Rosalind Olson MD, MPH  6/25/2021 , 12:21 PM   [facility] Infectious Disease   [address] [place] [address], [address] [place] [address]  Office: [phone]  Fax: [phone]  Clinic days:  Tuesday & Thursday

## 2021-06-25 NOTE — PROGRESS NOTES
[facility]   Progress Note      6/25/2021 7:52 AM        Name: Emily Joseph .               Admitted: 6/21/2021    SUBJECTIVE:  He is doing ok, offers no complaints, no external bleeding    Reviewed interval ancillary notes    Current Medications  0.9 % sodium chloride infusion, Continuous  magnesium oxide (MAG-OX) tablet 400 mg, TID  pantoprazole (PROTONIX) tablet 40 mg, QAM AC  potassium chloride (KLOR-CON M) extended release tablet 40 mEq, PRN   Or  potassium bicarb-citric acid (EFFER-K) effervescent tablet 40 mEq, PRN   Or  potassium chloride 10 mEq/100 mL IVPB (Peripheral Line), PRN  potassium phosphate (monobasic) (K-PHOS) tablet 250 mg, TID WC  magnesium sulfate 1000 mg in dextrose 5% 100 mL IVPB, PRN  ciprofloxacin (CIPRO) IVPB 400 mg, Q12H  metronidazole (FLAGYL) 500 mg in NaCl 100 mL IVPB premix, F6O  folic acid (FOLVITE) tablet 1 mg, Daily  levETIRAcetam (KEPPRA) tablet 750 mg, BID  risperiDONE (RISPERDAL) tablet 1 mg, Nightly  thiamine mononitrate tablet 100 mg, Daily  sodium chloride flush 0.9 % injection 5-40 mL, 2 times per day  sodium chloride flush 0.9 % injection 5-40 mL, PRN  0.9 % sodium chloride infusion, PRN  ondansetron (ZOFRAN-ODT) disintegrating tablet 4 mg, Q8H PRN   Or  ondansetron (ZOFRAN) injection 4 mg, Q6H PRN  polyethylene glycol (GLYCOLAX) packet 17 g, Daily PRN  acetaminophen (TYLENOL) tablet 650 mg, Q6H PRN   Or  acetaminophen (TYLENOL) suppository 650 mg, Q6H PRN        Objective:  BP (!) 102/46   Pulse 90   Temp 98.4 °F (36.9 °C) (Oral)   Resp 16   Ht 5' 11\" (1.803 m)   Wt 107 lb (48.5 kg)   SpO2 98%   BMI 14.92 kg/m²     Intake/Output Summary (Last 24 hours) at 6/25/2021 0752  Last data filed at 6/25/2021 5464  Gross per 24 hour   Intake 2310.53 ml   Output 600 ml   Net 1710.53 ml      Wt Readings from Last 3 Encounters:   06/21/21 107 lb (48.5 kg)   01/04/21 116 lb 2.9 oz (52.7 kg)   12/27/19 150 lb (68 kg)       General appearance:  Appears comfortable  Eyes: Sclera clear. Pupils equal.  ENT: Moist oral mucosa. Trachea midline, no adenopathy. Cardiovascular: Regular rhythm, normal S1, S2. No murmur. No edema in lower extremities  Respiratory: Not using accessory muscles. Good inspiratory effort. Clear to auscultation bilaterally, no wheeze or crackles. GI: Abdomen soft, no tenderness, not distended  Musculoskeletal: No cyanosis in digits, neck supple  Neurology: CN 2-12 grossly intact. No speech or motor deficits  Psych: Normal affect. Alert and oriented in time, place and person  Skin: Warm, dry, normal turgor    Labs and Tests:  CBC:   Recent Labs     06/23/21  0623 06/24/21  0532 06/25/21  0704   WBC 6.5 6.1 6.0   HGB 7.2* 7.8* 7.3*   PLT 63* 75* 91*     BMP:    Recent Labs     06/23/21  0623 06/24/21  0532 06/25/21  0704   * 136 135*   K 3.4* 3.5 3.8   CL 99 100 100   CO2 31 30 28   BUN 15 7 5*   CREATININE 1.0 0.9 1.2   GLUCOSE 136* 110* 134*     Hepatic:   Recent Labs     06/24/21  0532   AST 44*   ALT 24   BILITOT 0.7   ALKPHOS 80       ASSESSMENT AND PLAN    Active Problems:    Drug abuse counseling and surveillance of drug abuser    CATALINO (acute kidney injury) ([facility] [address]    Enterocolitis    Cirrhosis of liver without ascites (HCC)    Erosive esophagitis    Portal hypertensive gastropathy ([facility] [address]  Resolved Problems:    * No resolved hospital problems.  *      Anemia and thrombocytopenia  - normochromic/normocytic anemia  - no iron, B12/folate deficiency  - haptoglobin - 55, no evidence of hemolysis  - CLARK - pending  - CT abd/pelvis - showed cirrhosis, possibly acute cholecystitis  - US gallbladder - showed gallbladder wall thickening, no stones  - stool positive for occult blood - EGD 6/24/21 showed esophagitis, ulcers, no active bleeding  - plts improving  - hgb remains low, bone marrow bx done at bedside today, results pending    Possible sepsis  - ID following  - on antibiotics  - blood cultures from 6/21/21 - no growth to date         Radhika Sweet Lyndon Seals, [address] [facility]  [facility]    Patient was seen with BROOKE this morning. Persistent anemia and thrombocytopenia. Etiology remains unclear. Bone marrow aspiration and biopsy were performed at his right posterior iliac crest under local anesthesia and sterile condition. The consent was obtained prior to the procedure. The specimens were successfully obtained and he did not tolerate the procedure well without complications.      Cholo Mackey MD, MS

## 2021-06-25 NOTE — CARE COORDINATION
Discharge Note:    Met with patient at bedside. Denies any discharge needs. Has card for [facility] and agrees to call them to get an appt for this weekend. Sister will give him a ride and he'll be going to her home.     New PCP appointment scheduled:    Dr. Ed Quesada  [address]  [phone]    Appt:  Tuesday June 29th at 4 pm    Marylin Moore RN BSN  Case Management  [phone]

## 2021-06-26 LAB
ANA PATTERN: ABNORMAL
ANA TITER: ABNORMAL
ANTINUCLEAR AB INTERPRETIVE COMMENT: ABNORMAL
ANTINUCLEAR ANTIBODY, HEP-2, IGG: DETECTED
F-ACTIN AB IGG: 21 UNITS (ref 0–19)
SMOOTH MUSCLE AB IGG TITER: ABNORMAL

## 2021-06-28 LAB
ALPHA-1 ANTITRYPSIN PHENOTYPE: NORMAL
ALPHA-1 ANTITRYPSIN: 116 MG/DL (ref 90–200)

## 2021-06-29 LAB — CERULOPLASMIN: 16 MG/DL (ref 15–30)

## 2021-06-30 LAB — MITOCHONDRIAL M2 AB, IGG: 1.2 U/ML (ref 0–4)

## 2021-07-09 LAB
Lab: NORMAL
REPORT: NORMAL
THIS TEST SENT TO: NORMAL

## 2022-04-16 ENCOUNTER — APPOINTMENT (OUTPATIENT)
Dept: CT IMAGING | Age: 57
End: 2022-04-16
Payer: MEDICARE

## 2022-04-16 ENCOUNTER — HOSPITAL ENCOUNTER (EMERGENCY)
Age: 57
Discharge: HOME | End: 2022-04-16
Attending: EMERGENCY MEDICINE
Payer: MEDICARE

## 2022-04-16 VITALS
WEIGHT: 130 LBS | DIASTOLIC BLOOD PRESSURE: 71 MMHG | TEMPERATURE: 98 F | OXYGEN SATURATION: 100 % | HEART RATE: 65 BPM | BODY MASS INDEX: 20.89 KG/M2 | HEIGHT: 66 IN | SYSTOLIC BLOOD PRESSURE: 152 MMHG | RESPIRATION RATE: 18 BRPM

## 2022-04-16 DIAGNOSIS — E83.42 HYPOMAGNESEMIA: ICD-10-CM

## 2022-04-16 DIAGNOSIS — R20.0 NUMBNESS: Primary | ICD-10-CM

## 2022-04-16 LAB
A/G RATIO: 0.8 (ref 1.1–2.2)
ALBUMIN SERPL-MCNC: 3.7 G/DL (ref 3.4–5)
ALP BLD-CCNC: 99 U/L (ref 40–129)
ALT SERPL-CCNC: 21 U/L (ref 10–40)
ANION GAP SERPL CALCULATED.3IONS-SCNC: 9 MMOL/L (ref 3–16)
AST SERPL-CCNC: 44 U/L (ref 15–37)
BASOPHILS ABSOLUTE: 0.1 K/UL (ref 0–0.2)
BASOPHILS RELATIVE PERCENT: 1 %
BILIRUB SERPL-MCNC: 0.4 MG/DL (ref 0–1)
BILIRUBIN URINE: NEGATIVE
BLOOD, URINE: NEGATIVE
BUN BLDV-MCNC: 8 MG/DL (ref 7–20)
CALCIUM SERPL-MCNC: 9.9 MG/DL (ref 8.3–10.6)
CHLORIDE BLD-SCNC: 105 MMOL/L (ref 99–110)
CLARITY: CLEAR
CO2: 27 MMOL/L (ref 21–32)
COLOR: NORMAL
CREAT SERPL-MCNC: 0.9 MG/DL (ref 0.9–1.3)
EOSINOPHILS ABSOLUTE: 0.4 K/UL (ref 0–0.6)
EOSINOPHILS RELATIVE PERCENT: 8.2 %
GFR AFRICAN AMERICAN: >60
GFR NON-AFRICAN AMERICAN: >60
GLUCOSE BLD-MCNC: 78 MG/DL (ref 70–99)
GLUCOSE URINE: NEGATIVE MG/DL
HCT VFR BLD CALC: 38.6 % (ref 40.5–52.5)
HEMOGLOBIN: 12.5 G/DL (ref 13.5–17.5)
KETONES, URINE: NEGATIVE MG/DL
LEUKOCYTE ESTERASE, URINE: NEGATIVE
LYMPHOCYTES ABSOLUTE: 2.4 K/UL (ref 1–5.1)
LYMPHOCYTES RELATIVE PERCENT: 48.5 %
MAGNESIUM: 1.5 MG/DL (ref 1.8–2.4)
MCH RBC QN AUTO: 28.2 PG (ref 26–34)
MCHC RBC AUTO-ENTMCNC: 32.2 G/DL (ref 31–36)
MCV RBC AUTO: 87.5 FL (ref 80–100)
MICROSCOPIC EXAMINATION: NORMAL
MONOCYTES ABSOLUTE: 0.6 K/UL (ref 0–1.3)
MONOCYTES RELATIVE PERCENT: 12.3 %
NEUTROPHILS ABSOLUTE: 1.5 K/UL (ref 1.7–7.7)
NEUTROPHILS RELATIVE PERCENT: 30 %
NITRITE, URINE: NEGATIVE
PDW BLD-RTO: 16.5 % (ref 12.4–15.4)
PH UA: 6 (ref 5–8)
PHENYTOIN DOSE AMOUNT: ABNORMAL
PHENYTOIN LEVEL: <0.8 UG/ML (ref 10–20)
PLATELET # BLD: 119 K/UL (ref 135–450)
PMV BLD AUTO: 7.7 FL (ref 5–10.5)
POTASSIUM REFLEX MAGNESIUM: 4.2 MMOL/L (ref 3.5–5.1)
PROTEIN UA: NEGATIVE MG/DL
RBC # BLD: 4.41 M/UL (ref 4.2–5.9)
SODIUM BLD-SCNC: 141 MMOL/L (ref 136–145)
SPECIFIC GRAVITY UA: 1.01 (ref 1–1.03)
TOTAL CK: 189 U/L (ref 39–308)
TOTAL PROTEIN: 8.2 G/DL (ref 6.4–8.2)
URINE REFLEX TO CULTURE: NORMAL
URINE TYPE: NORMAL
UROBILINOGEN, URINE: 0.2 E.U./DL
WBC # BLD: 4.9 K/UL (ref 4–11)

## 2022-04-16 PROCEDURE — 80053 COMPREHEN METABOLIC PANEL: CPT

## 2022-04-16 PROCEDURE — 70450 CT HEAD/BRAIN W/O DYE: CPT

## 2022-04-16 PROCEDURE — 96365 THER/PROPH/DIAG IV INF INIT: CPT

## 2022-04-16 PROCEDURE — 6360000002 HC RX W HCPCS: Performed by: PHYSICIAN ASSISTANT

## 2022-04-16 PROCEDURE — 80185 ASSAY OF PHENYTOIN TOTAL: CPT

## 2022-04-16 PROCEDURE — 36415 COLL VENOUS BLD VENIPUNCTURE: CPT

## 2022-04-16 PROCEDURE — 85025 COMPLETE CBC W/AUTO DIFF WBC: CPT

## 2022-04-16 PROCEDURE — 82550 ASSAY OF CK (CPK): CPT

## 2022-04-16 PROCEDURE — 81003 URINALYSIS AUTO W/O SCOPE: CPT

## 2022-04-16 PROCEDURE — 83735 ASSAY OF MAGNESIUM: CPT

## 2022-04-16 PROCEDURE — 99284 EMERGENCY DEPT VISIT MOD MDM: CPT

## 2022-04-16 RX ORDER — LANOLIN ALCOHOL/MO/W.PET/CERES
400 CREAM (GRAM) TOPICAL 2 TIMES DAILY
Qty: 30 TABLET | Refills: 0 | Status: SHIPPED | OUTPATIENT
Start: 2022-04-16

## 2022-04-16 RX ORDER — MAGNESIUM SULFATE IN WATER 40 MG/ML
2000 INJECTION, SOLUTION INTRAVENOUS ONCE
Status: COMPLETED | OUTPATIENT
Start: 2022-04-16 | End: 2022-04-16

## 2022-04-16 RX ADMIN — MAGNESIUM SULFATE HEPTAHYDRATE 2000 MG: 40 INJECTION, SOLUTION INTRAVENOUS at 17:38

## 2022-04-16 ASSESSMENT — PAIN DESCRIPTION - DESCRIPTORS: DESCRIPTORS: NUMBNESS;TINGLING

## 2022-04-16 ASSESSMENT — PAIN DESCRIPTION - LOCATION: LOCATION: HAND;ARM;FOOT

## 2022-04-16 ASSESSMENT — PAIN DESCRIPTION - PAIN TYPE: TYPE: ACUTE PAIN

## 2022-04-16 ASSESSMENT — PAIN DESCRIPTION - ORIENTATION: ORIENTATION: RIGHT;LEFT

## 2022-04-16 NOTE — ED PROVIDER NOTES
[facility] Emergency Department      Pt Name: Ivonne Hines  MRN: [id number]  [place] 1965  Date of evaluation: 4/16/2022  Provider: Jamel Mendez MD  I independently performed a history and physical on Ivonne Hines. All diagnostic, treatment, and disposition decisions were made by myself in conjunction with the advanced practice provider. HPI: Ivonne Hines presented with   Chief Complaint   Patient presents with    Numbness     pt brought in by ems from [facility], pt c/o numbness and tingling in ext for 2 weeks. pt is from a group home, picked up at [facility]. stroke team was notified and was cancelled by Zuleika Concepcions has a past medical history of Alcohol abuse, Cancer ([facility] [address] and Seizures ([facility] [address] He has a past surgical history that includes Upper gastrointestinal endoscopy (N/A, 6/24/2021). No current facility-administered medications on file prior to encounter.      Current Outpatient Medications on File Prior to Encounter   Medication Sig Dispense Refill    pantoprazole (PROTONIX) 40 MG tablet Take 1 tablet by mouth every morning (before breakfast) 30 tablet 0    phenytoin (DILANTIN) 100 MG ER capsule Take 250 mg by mouth 2 times daily      potassium chloride (KLOR-CON M) 20 MEQ extended release tablet Take 2 tablets by mouth 2 times daily (with meals) 60 tablet 0    levETIRAcetam (KEPPRA) 750 MG tablet Take 1 tablet by mouth 2 times daily 60 tablet 3    risperiDONE (RISPERDAL) 1 MG tablet Take 1 mg by mouth nightly      folic acid (FOLVITE) 1 MG tablet Take 1 tablet by mouth daily 30 tablet 3    Multiple Vitamin (MULTIVITAMIN) tablet Take 1 tablet by mouth daily 30 tablet 3    vitamin B-1 100 MG tablet Take 1 tablet by mouth daily 30 tablet 3     PHYSICAL EXAM  Vitals: BP (!) 144/79   Pulse 71   Temp 98 °F (36.7 °C) (Oral)   Resp 18   Ht 5' 6\" (1.676 m)   Wt 130 lb (59 kg)   SpO2 100%   BMI 20.98 kg/m²   Constitutional:  60 y.o. male alert  HENT:  Atraumatic, oral mucosa moist  Neck:  No visible JVD, supple  Chest/Lungs:  Respiratory effort normal, clear, regular  Abdomen:  Non-distended, soft, NT  Back:  No gross deformity  Extremities:  Normal tone and perfusion  Neurologic:  Alert, speech normal, mentation normal, pupils equal, normal coordination of extremities, no facial asymmetry, light touch sensation intact,  symmetric    Medical Decision Making and Plan: Briefly, this is an 60 y.o.male who presented with concern for extremity numbness for 2 weeks. Hx of alcohol abuse. Hypomagnesemia noted  He is followed by [facility] for seizures. Etiology may be associated with electrolytes, though he is chronically low on his levels, questionable about whether he takes his supplement. He abuses alcohol so he may have a degree of neuropathy related to abuse or vitamin deficiency. He will need further work up as an outpatient with testing that cannot be done in an ED setting. We feel he is stable to proceed with outpatient management. Jose Luis Jaramillo was given appropriate discharge instructions. Referral to follow up provider. For further details of [place] [address] Emergency Department encounter, please see documentation by advanced practice provider KATT Olivier.      Labs Reviewed   CBC WITH AUTO DIFFERENTIAL - Abnormal; Notable for the following components:       Result Value    Hemoglobin 12.5 (*)     Hematocrit 38.6 (*)     RDW 16.5 (*)     Platelets 892 (*)     Neutrophils Absolute 1.5 (*)     All other components within normal limits   COMPREHENSIVE METABOLIC PANEL W/ REFLEX TO MG FOR LOW K - Abnormal; Notable for the following components:    Albumin/Globulin Ratio 0.8 (*)     AST 44 (*)     All other components within normal limits   PHENYTOIN LEVEL, TOTAL - Abnormal; Notable for the following components:    Phenytoin Lvl <0.8 (*)     All other components within normal limits   MAGNESIUM - Abnormal; Notable for the following components:    Magnesium 1.50 (*)     All other components within normal limits   URINALYSIS WITH REFLEX TO CULTURE   CK     RADIOLOGY:    CT HEAD WO CONTRAST   Final Result   No acute intracranial abnormality. Medications administered:  Medications   magnesium sulfate 2000 mg in 50 mL IVPB premix (0 mg IntraVENous Stopped 4/16/22 0187)     Discharge Medication List as of 4/16/2022  8:17 PM   Magnesium oxide     FOLLOW UP:    Surendra Drummond MD  [address]  [phone]    Schedule an appointment as soon as possible for a visit       [facility] Emergency Department  [address]  Go to   If symptoms worsen    FINAL IMPRESSION:    1. Numbness    2.  Hypomagnesemia       DISPOSITION Decision To Discharge 04/16/2022 08:01:41 PM          Marquis Salena MD  04/16/22 2123

## 2022-04-16 NOTE — ED PROVIDER NOTES
[facility]        Pt Name: Lv August  MRN: [id number]  [place] 1965  Date of evaluation: 4/16/2022  Provider: Marcus Stringer PA-C  PCP: No primary care provider on file. Note Started: 3:43 PM EDT        I have seen and evaluated this patient with my supervising physician Oxana Casillas, *. CHIEF COMPLAINT       Chief Complaint   Patient presents with    Numbness     pt brought in by ems from [facility], pt c/o numbness and tingling in ext for 2 weeks. pt is from a group home, picked up at [facility]. stroke team was notified and was cancelled by ems       HISTORY OF PRESENT ILLNESS   (Location, Timing/Onset, Context/Setting, Quality, Duration, Modifying Factors, Severity, Associated Signs and Symptoms)  Note limiting factors. Chief Complaint: Tim August is a 60 y.o. male who presents to the emergency department with a chief complaint of numbness in his bilateral upper and lower extremities that is been ongoing for the past 2 weeks. He states that he presented here today because it has been ongoing for 2 weeks. He states he did have 1 beer today however as a \"celebration. \"  He has previous history of alcohol abuse but he states that he does not drink every day and denies illicit drug use. He is on Dilantin for seizures and he states this is the only medication that he takes. He denies chest pain, shortness breath, abdominal pain, urinary or bowel symptoms, history of chronic kidney disease, pain or any other symptoms. Nursing Notes were all reviewed and agreed with or any disagreements were addressed in the HPI. REVIEW OF SYSTEMS    (2-9 systems for level 4, 10 or more for level 5)     Review of Systems    Positives and Pertinent negatives as per HPI. Except as noted above in the ROS, all other systems were reviewed and negative.        PAST MEDICAL HISTORY     Past Medical level 5)     ED Triage Vitals [04/16/22 1508]   BP Temp Temp Source Pulse Resp SpO2 Height Weight   (!) 142/70 98 °F (36.7 °C) Oral 60 18 98 % 5' 6\" (1.676 m) 130 lb (59 kg)       Physical Exam  Vitals and nursing note reviewed. Constitutional:       Appearance: He is well-developed. He is not diaphoretic. HENT:      Head: Atraumatic. Nose: Nose normal.   Eyes:      General:         Right eye: No discharge. Left eye: No discharge. Cardiovascular:      Rate and Rhythm: Normal rate and regular rhythm. Pulses: Normal pulses. Heart sounds: No murmur heard. No friction rub. No gallop. Pulmonary:      Effort: Pulmonary effort is normal. No respiratory distress. Breath sounds: No stridor. No wheezing, rhonchi or rales. Abdominal:      General: Bowel sounds are normal. There is no distension. Palpations: Abdomen is soft. There is no mass. Tenderness: There is no abdominal tenderness. There is no guarding or rebound. Hernia: No hernia is present. Musculoskeletal:         General: No swelling, tenderness or deformity. Cervical back: Normal range of motion. Skin:     General: Skin is warm and dry. Findings: No erythema or rash. Neurological:      Mental Status: He is alert and oriented to person, place, and time. Cranial Nerves: No cranial nerve deficit.    Psychiatric:         Behavior: Behavior normal.         DIAGNOSTIC RESULTS   LABS:    Labs Reviewed   CBC WITH AUTO DIFFERENTIAL - Abnormal; Notable for the following components:       Result Value    Hemoglobin 12.5 (*)     Hematocrit 38.6 (*)     RDW 16.5 (*)     Platelets 892 (*)     Neutrophils Absolute 1.5 (*)     All other components within normal limits   COMPREHENSIVE METABOLIC PANEL W/ REFLEX TO MG FOR LOW K - Abnormal; Notable for the following components:    Albumin/Globulin Ratio 0.8 (*)     AST 44 (*)     All other components within normal limits   PHENYTOIN LEVEL, TOTAL - Abnormal; Notable for the following components:    Phenytoin Lvl <0.8 (*)     All other components within normal limits   MAGNESIUM - Abnormal; Notable for the following components:    Magnesium 1.50 (*)     All other components within normal limits   CK   URINALYSIS WITH REFLEX TO CULTURE       When ordered only abnormal lab results are displayed. All other labs were within normal range or not returned as of this dictation. EKG: When ordered, EKG's are interpreted by the Emergency Department Physician in the absence of a cardiologist.  Please see their note for interpretation of EKG. RADIOLOGY:   Non-plain film images such as CT, Ultrasound and MRI are read by the radiologist. Plain radiographic images are visualized and preliminarily interpreted by the ED Provider with the below findings:        Interpretation per the Radiologist below, if available at the time of this note:    No orders to display     No results found. PROCEDURES   Unless otherwise noted below, none     Procedures    CRITICAL CARE TIME       CONSULTS:  None      EMERGENCY DEPARTMENT COURSE and DIFFERENTIAL DIAGNOSIS/MDM:   Vitals:    Vitals:    04/16/22 1508   BP: (!) 142/70   Pulse: 60   Resp: 18   Temp: 98 °F (36.7 °C)   TempSrc: Oral   SpO2: 98%   Weight: 130 lb (59 kg)   Height: 5' 6\" (1.676 m)       Patient was given the following medications:  Medications   magnesium sulfate 2000 mg in 50 mL IVPB premix (has no administration in time range)           Patient presented with some numbness in his bilateral arms and legs. He still able to move these but does have some decreased strength. Still able to ambulate. States he only drank 1 beer today and then presented to the emergency department has been having symptoms for 2 weeks. This not unilateral.  Has mildly chronically elevated AST. His magnesium is 1.5. Will give him some IV magnesium and recheck. Nothing to suggest DVT, arterial occlusion, cellulitis.   This is not in a sending numbness and is only in his lower legs and lower arms. Attending physician will follow and recheck after IV magnesium to see if this improves any of his symptoms. Please see attending note for any change in care. Patient nontoxic at this time. Was stable time of turnover. FINAL IMPRESSION      1. Numbness    2. Hypomagnesemia          DISPOSITION/PLAN   DISPOSITION        PATIENT REFERRED TO:  No follow-up provider specified.     DISCHARGE MEDICATIONS:  New Prescriptions    No medications on file       DISCONTINUED MEDICATIONS:  Discontinued Medications    No medications on file              (Please note that portions of this note were completed with a voice recognition program.  Efforts were made to edit the dictations but occasionally words are mis-transcribed.)    Nini Akins PA-C (electronically signed)            Nini Akins PA-C  04/16/22 2013

## 2022-04-17 ENCOUNTER — HOSPITAL ENCOUNTER (EMERGENCY)
Age: 57
Discharge: HOME | End: 2022-04-17
Attending: EMERGENCY MEDICINE
Payer: MEDICARE

## 2022-04-17 ENCOUNTER — APPOINTMENT (OUTPATIENT)
Dept: CT IMAGING | Age: 57
End: 2022-04-17
Payer: MEDICARE

## 2022-04-17 VITALS
SYSTOLIC BLOOD PRESSURE: 156 MMHG | HEART RATE: 79 BPM | TEMPERATURE: 97.1 F | DIASTOLIC BLOOD PRESSURE: 81 MMHG | OXYGEN SATURATION: 93 % | RESPIRATION RATE: 16 BRPM

## 2022-04-17 DIAGNOSIS — M54.12 CERVICAL RADICULOPATHY: ICD-10-CM

## 2022-04-17 DIAGNOSIS — G40.919 BREAKTHROUGH SEIZURE (HCC): Primary | ICD-10-CM

## 2022-04-17 PROCEDURE — 6370000000 HC RX 637 (ALT 250 FOR IP): Performed by: EMERGENCY MEDICINE

## 2022-04-17 PROCEDURE — 72125 CT NECK SPINE W/O DYE: CPT

## 2022-04-17 PROCEDURE — 99284 EMERGENCY DEPT VISIT MOD MDM: CPT

## 2022-04-17 PROCEDURE — 70450 CT HEAD/BRAIN W/O DYE: CPT

## 2022-04-17 RX ORDER — LORAZEPAM 1 MG/1
1 TABLET ORAL ONCE
Status: COMPLETED | OUTPATIENT
Start: 2022-04-17 | End: 2022-04-17

## 2022-04-17 RX ORDER — DEXAMETHASONE 4 MG/1
4 TABLET ORAL
Qty: 5 TABLET | Refills: 0 | Status: SHIPPED | OUTPATIENT
Start: 2022-04-17 | End: 2022-04-22

## 2022-04-17 RX ORDER — DIAZEPAM 2 MG/1
2 TABLET ORAL EVERY 6 HOURS PRN
Qty: 20 TABLET | Refills: 0 | Status: SHIPPED | OUTPATIENT
Start: 2022-04-17 | End: 2022-04-27

## 2022-04-17 RX ADMIN — LORAZEPAM 1 MG: 1 TABLET ORAL at 12:50

## 2022-04-17 NOTE — ED TRIAGE NOTES
Erendira hematoma to right antiecub area. Squad said hit any artery. + brisk radial pulse.   Dr Serena Ayala asked to put a compression badage on area     + radial pulse after application

## 2022-04-17 NOTE — ED NOTES
Writer called caregiver Alicia at [phone] for transportation.      Lui Hernandez RN  04/17/22 3836

## 2022-04-17 NOTE — ED PROVIDER NOTES
eMERGENCY dEPARTMENT eNCOUnter      CHIEF COMPLAINT    Chief Complaint   Patient presents with    Seizures     sister called squad for seizure here yesterday for same       HPI    Attila Hutchins is a 60 y.o. male who presents for evaluation of seizure breakthrough, known history of idiopathic seizures, history of alcohol use abuse was seen 24 hours prior, EMS was dispatched for seizure breakthrough. He complains of increasing headache and neck pain he had a unremarkable CAT scan yesterday history of electrolyte abnormalities. No seizure. At his neurologic baseline    PAST MEDICAL HISTORY    Past Medical History:   Diagnosis Date    Alcohol abuse     [facility] [address]     Seizures ([facility] [address]        SURGICAL HISTORY    Past Surgical History:   Procedure Laterality Date    UPPER GASTROINTESTINAL ENDOSCOPY N/A 6/24/2021    EGD GASTRIC BIOPSY R/O HPYLORI performed by Stephie Castro MD at [facility]    Current Outpatient Rx   Medication Sig Dispense Refill    diazePAM (VALIUM) 2 MG tablet Take 1 tablet by mouth every 6 hours as needed for Anxiety for up to 10 days.  20 tablet 0    magnesium oxide (MAG-OX) 400 (240 Mg) MG tablet Take 1 tablet by mouth 2 times daily 30 tablet 0    pantoprazole (PROTONIX) 40 MG tablet Take 1 tablet by mouth every morning (before breakfast) 30 tablet 0    phenytoin (DILANTIN) 100 MG ER capsule Take 250 mg by mouth 2 times daily      potassium chloride (KLOR-CON M) 20 MEQ extended release tablet Take 2 tablets by mouth 2 times daily (with meals) 60 tablet 0    levETIRAcetam (KEPPRA) 750 MG tablet Take 1 tablet by mouth 2 times daily 60 tablet 3    risperiDONE (RISPERDAL) 1 MG tablet Take 1 mg by mouth nightly      folic acid (FOLVITE) 1 MG tablet Take 1 tablet by mouth daily 30 tablet 3    Multiple Vitamin (MULTIVITAMIN) tablet Take 1 tablet by mouth daily 30 tablet 3    vitamin B-1 100 MG tablet Take 1 tablet by mouth daily 30 tablet 3 ALLERGIES    Allergies   Allergen Reactions    Carbamazepine Other (See Comments)     Thrombocytopenia    Codeine Itching     Per ER    Phenytoin Itching    Sulfa Antibiotics      Per ER       FAMILY HISTORY    History reviewed. No pertinent family history. SOCIAL HISTORY    Social History     Socioeconomic History    Marital status: Single     Spouse name: None    Number of children: 0    Years of education: None    Highest education level: None   Occupational History    None   Tobacco Use    Smoking status: Current Every Day Smoker     Packs/day: 1.00     Years: 40.00     Pack years: 40.00    Smokeless tobacco: Never Used   Vaping Use    Vaping Use: Never used   Substance and Sexual Activity    Alcohol use: Yes     Alcohol/week: 1.0 standard drink     Types: 1 Cans of beer per week     Comment: daily    Drug use: Yes     Types: Marijuana [place])     Comment: once every other weekend (+ on drug screen 05/15/18)    Sexual activity: None   Other Topics Concern    None   Social History Narrative    ** Merged History Encounter **         ** Merged History Encounter **          Social Determinants of Health     Financial Resource Strain:     Difficulty of Paying Living Expenses: Not on file   Food Insecurity:     Worried About Running Out of Food in the Last Year: Not on file    Barber of Food in the Last Year: Not on file   Transportation Needs:     Lack of Transportation (Medical): Not on file    Lack of Transportation (Non-Medical):  Not on file   Physical Activity:     Days of Exercise per Week: Not on file    Minutes of Exercise per Session: Not on file   Stress:     Feeling of Stress : Not on file   Social Connections:     Frequency of Communication with Friends and Family: Not on file    Frequency of Social Gatherings with Friends and Family: Not on file    Attends Buddhist Services: Not on file    Active Member of Clubs or Organizations: Not on file    Attends Club or Organization Meetings: Not on file    Marital Status: Not on file   Intimate Partner Violence:     Fear of Current or Ex-Partner: Not on file    Emotionally Abused: Not on file    Physically Abused: Not on file    Sexually Abused: Not on file   Housing Stability:     Unable to Pay for Housing in the Last Year: Not on file    Number of [place] in the Last Year: Not on file    Unstable Housing in the Last Year: Not on file       REVIEW OF SYSTEMS    Constitutional:  Denies fever, chills, weight loss or weakness   Eyes:  Denies photophobia or discharge   HENT:  Denies sore throat or ear pain   Respiratory:  Denies cough or shortness of breath   Cardiovascular:  Denies chest pain, palpitations or swelling   GI:  Denies abdominal pain, nausea, vomiting, or diarrhea   Musculoskeletal:  Denies back pain   Skin:  Denies rash   Neurologic:  Denies headache, focal weakness or sensory changes onset of seizure  Endocrine:  Denies polyuria or polydypsia   Lymphatic:  Denies swollen glands   Psychiatric:  Denies depression, suicidal ideation or homicidal ideation   All systems negative except as marked. PHYSICAL EXAM    VITAL SIGNS: BP (!) 156/81   Pulse 79   Temp 97.1 °F (36.2 °C) (Oral)   Resp 16   SpO2 93%    Constitutional:  Well developed, Well nourished, No acute distress, Non-toxic appearance. HENT:  Normocephalic, Atraumatic, Bilateral external ears normal, Oropharynx moist, No oral exudates, Nose normal. Neck- Normal range of motion, No tenderness, Supple, No stridor. Eyes:  PERRL, EOMI, Conjunctiva normal, No discharge. Respiratory:  Normal breath sounds, No respiratory distress, No wheezing, No chest tenderness. Cardiovascular:  Normal heart rate, Normal rhythm, No murmurs, No rubs, No gallops. GI:  Bowel sounds normal, Soft, No tenderness, No masses, No pulsatile masses. : External genitalia appear normal, No masses or lesions. No discharge. No CVA tenderness.    Musculoskeletal: Intact distal pulses, No edema, No tenderness, No cyanosis, No clubbing. Good range of motion in all major joints. No tenderness to palpation or major deformities noted. Back- No tenderness. Integument:  Warm, Dry, No erythema, No rash. Hematomas noted bilateral antecubital fossa from attempted EMS access  Lymphatic:  No lymphadenopathy noted. Neurologic:  Alert & oriented x 3, Normal motor function, Normal sensory function, No focal deficits noted. Psychiatric:  Affect normal, Judgment normal, Mood normal.     RADIOLOGY    Ct Head: No intracranial    PROCEDURES    none    ED COURSE & MEDICAL DECISION MAKING    Pertinent Labs & Imaging studies reviewed. (See chart for details)  Patient presents to the ER for evaluation of seizure breakthrough without evidence of intracranial hemorrhage, he is to continue his antiepileptics, benzodiazepines for the next 48 hours, he is return if he is worse or new symptoms. FINAL IMPRESSION    1.  Breakthrough seizure ([address]    2. Cervical radiculopathy            Jackie Ryan MD  22/46/92 8765

## 2023-08-06 ENCOUNTER — HOSPITAL ENCOUNTER (INPATIENT)
Age: 58
LOS: 3 days | Discharge: HOME HEALTH | DRG: 100 | End: 2023-08-09
Attending: EMERGENCY MEDICINE | Admitting: STUDENT IN AN ORGANIZED HEALTH CARE EDUCATION/TRAINING PROGRAM
Payer: MEDICARE

## 2023-08-06 DIAGNOSIS — E87.1 HYPONATREMIA: Primary | ICD-10-CM

## 2023-08-06 DIAGNOSIS — G40.919 BREAKTHROUGH SEIZURE (HCC): ICD-10-CM

## 2023-08-06 LAB
ALBUMIN SERPL-MCNC: 4.5 G/DL (ref 3.4–5)
ALBUMIN/GLOB SERPL: 1 {RATIO} (ref 1.1–2.2)
ALP SERPL-CCNC: 118 U/L (ref 40–129)
ALT SERPL-CCNC: 41 U/L (ref 10–40)
ANION GAP SERPL CALCULATED.3IONS-SCNC: 18 MMOL/L (ref 3–16)
ANION GAP SERPL CALCULATED.3IONS-SCNC: 6 MMOL/L (ref 3–16)
AST SERPL-CCNC: 76 U/L (ref 15–37)
BASOPHILS # BLD: 0 K/UL (ref 0–0.2)
BASOPHILS NFR BLD: 0.6 %
BILIRUB SERPL-MCNC: 0.9 MG/DL (ref 0–1)
BUN SERPL-MCNC: 11 MG/DL (ref 7–20)
BUN SERPL-MCNC: 18 MG/DL (ref 7–20)
CALCIUM SERPL-MCNC: 10 MG/DL (ref 8.3–10.6)
CALCIUM SERPL-MCNC: 8.9 MG/DL (ref 8.3–10.6)
CHLORIDE SERPL-SCNC: 102 MMOL/L (ref 99–110)
CHLORIDE SERPL-SCNC: 93 MMOL/L (ref 99–110)
CO2 SERPL-SCNC: 16 MMOL/L (ref 21–32)
CO2 SERPL-SCNC: 21 MMOL/L (ref 21–32)
CREAT SERPL-MCNC: 0.9 MG/DL (ref 0.8–1.3)
CREAT SERPL-MCNC: 1 MG/DL (ref 0.8–1.3)
DEPRECATED RDW RBC AUTO: 15.6 % (ref 12.4–15.4)
EKG ATRIAL RATE: 72 BPM
EKG DIAGNOSIS: NORMAL
EKG P AXIS: 73 DEGREES
EKG P-R INTERVAL: 136 MS
EKG Q-T INTERVAL: 388 MS
EKG QRS DURATION: 84 MS
EKG QTC CALCULATION (BAZETT): 424 MS
EKG R AXIS: 87 DEGREES
EKG T AXIS: 81 DEGREES
EKG VENTRICULAR RATE: 72 BPM
EOSINOPHIL # BLD: 0.2 K/UL (ref 0–0.6)
EOSINOPHIL NFR BLD: 3.4 %
GFR SERPLBLD CREATININE-BSD FMLA CKD-EPI: >60 ML/MIN/{1.73_M2}
GFR SERPLBLD CREATININE-BSD FMLA CKD-EPI: >60 ML/MIN/{1.73_M2}
GLUCOSE SERPL-MCNC: 87 MG/DL (ref 70–99)
GLUCOSE SERPL-MCNC: 98 MG/DL (ref 70–99)
HCT VFR BLD AUTO: 44.3 % (ref 40.5–52.5)
HGB BLD-MCNC: 14.5 G/DL (ref 13.5–17.5)
LEVETIRACETAM SERPL-MCNC: >100 UG/ML (ref 6–46)
LYMPHOCYTES # BLD: 3.2 K/UL (ref 1–5.1)
LYMPHOCYTES NFR BLD: 48.3 %
MCH RBC QN AUTO: 30.4 PG (ref 26–34)
MCHC RBC AUTO-ENTMCNC: 32.7 G/DL (ref 31–36)
MCV RBC AUTO: 92.9 FL (ref 80–100)
MEDICATION DOSE-MCNC: ABNORMAL
MONOCYTES # BLD: 0.4 K/UL (ref 0–1.3)
MONOCYTES NFR BLD: 6.8 %
NEUTROPHILS # BLD: 2.7 K/UL (ref 1.7–7.7)
NEUTROPHILS NFR BLD: 40.9 %
PLATELET # BLD AUTO: 124 K/UL (ref 135–450)
PMV BLD AUTO: 7.8 FL (ref 5–10.5)
POTASSIUM SERPL-SCNC: 4.2 MMOL/L (ref 3.5–5.1)
POTASSIUM SERPL-SCNC: 4.5 MMOL/L (ref 3.5–5.1)
PROT SERPL-MCNC: 8.8 G/DL (ref 6.4–8.2)
RBC # BLD AUTO: 4.77 M/UL (ref 4.2–5.9)
SODIUM SERPL-SCNC: 127 MMOL/L (ref 136–145)
SODIUM SERPL-SCNC: 129 MMOL/L (ref 136–145)
WBC # BLD AUTO: 6.5 K/UL (ref 4–11)

## 2023-08-06 PROCEDURE — 6370000000 HC RX 637 (ALT 250 FOR IP): Performed by: PHYSICIAN ASSISTANT

## 2023-08-06 PROCEDURE — 80186 ASSAY OF PHENYTOIN FREE: CPT

## 2023-08-06 PROCEDURE — 6360000002 HC RX W HCPCS: Performed by: NURSE PRACTITIONER

## 2023-08-06 PROCEDURE — 6360000002 HC RX W HCPCS: Performed by: PHYSICIAN ASSISTANT

## 2023-08-06 PROCEDURE — 2060000000 HC ICU INTERMEDIATE R&B

## 2023-08-06 PROCEDURE — 36415 COLL VENOUS BLD VENIPUNCTURE: CPT

## 2023-08-06 PROCEDURE — 80053 COMPREHEN METABOLIC PANEL: CPT

## 2023-08-06 PROCEDURE — 80185 ASSAY OF PHENYTOIN TOTAL: CPT

## 2023-08-06 PROCEDURE — 2580000003 HC RX 258: Performed by: NURSE PRACTITIONER

## 2023-08-06 PROCEDURE — 93010 ELECTROCARDIOGRAM REPORT: CPT | Performed by: INTERNAL MEDICINE

## 2023-08-06 PROCEDURE — 80177 DRUG SCRN QUAN LEVETIRACETAM: CPT

## 2023-08-06 PROCEDURE — 93005 ELECTROCARDIOGRAM TRACING: CPT | Performed by: NURSE PRACTITIONER

## 2023-08-06 PROCEDURE — 96365 THER/PROPH/DIAG IV INF INIT: CPT

## 2023-08-06 PROCEDURE — 1200000000 HC SEMI PRIVATE

## 2023-08-06 PROCEDURE — 2580000003 HC RX 258: Performed by: PHYSICIAN ASSISTANT

## 2023-08-06 PROCEDURE — 2500000003 HC RX 250 WO HCPCS: Performed by: PHYSICIAN ASSISTANT

## 2023-08-06 PROCEDURE — 85025 COMPLETE CBC W/AUTO DIFF WBC: CPT

## 2023-08-06 PROCEDURE — 99285 EMERGENCY DEPT VISIT HI MDM: CPT

## 2023-08-06 RX ORDER — LEVETIRACETAM 10 MG/ML
1000 INJECTION INTRAVASCULAR ONCE
Status: COMPLETED | OUTPATIENT
Start: 2023-08-06 | End: 2023-08-06

## 2023-08-06 RX ORDER — LORAZEPAM 1 MG/1
1 TABLET ORAL
Status: DISCONTINUED | OUTPATIENT
Start: 2023-08-06 | End: 2023-08-09 | Stop reason: HOSPADM

## 2023-08-06 RX ORDER — ENOXAPARIN SODIUM 100 MG/ML
40 INJECTION SUBCUTANEOUS DAILY
Status: DISCONTINUED | OUTPATIENT
Start: 2023-08-06 | End: 2023-08-09 | Stop reason: HOSPADM

## 2023-08-06 RX ORDER — ACETAMINOPHEN 325 MG/1
650 TABLET ORAL EVERY 6 HOURS PRN
Status: DISCONTINUED | OUTPATIENT
Start: 2023-08-06 | End: 2023-08-09 | Stop reason: HOSPADM

## 2023-08-06 RX ORDER — SODIUM CHLORIDE 9 MG/ML
INJECTION, SOLUTION INTRAVENOUS CONTINUOUS
Status: DISCONTINUED | OUTPATIENT
Start: 2023-08-06 | End: 2023-08-07

## 2023-08-06 RX ORDER — 0.9 % SODIUM CHLORIDE 0.9 %
1000 INTRAVENOUS SOLUTION INTRAVENOUS ONCE
Status: COMPLETED | OUTPATIENT
Start: 2023-08-06 | End: 2023-08-06

## 2023-08-06 RX ORDER — RISPERIDONE 0.25 MG/1
1 TABLET ORAL NIGHTLY
Status: DISCONTINUED | OUTPATIENT
Start: 2023-08-06 | End: 2023-08-09 | Stop reason: HOSPADM

## 2023-08-06 RX ORDER — LORAZEPAM 1 MG/1
3 TABLET ORAL
Status: DISCONTINUED | OUTPATIENT
Start: 2023-08-06 | End: 2023-08-09 | Stop reason: HOSPADM

## 2023-08-06 RX ORDER — LORAZEPAM 2 MG/ML
1 INJECTION INTRAMUSCULAR
Status: DISCONTINUED | OUTPATIENT
Start: 2023-08-06 | End: 2023-08-09 | Stop reason: HOSPADM

## 2023-08-06 RX ORDER — ONDANSETRON 2 MG/ML
4 INJECTION INTRAMUSCULAR; INTRAVENOUS EVERY 6 HOURS PRN
Status: DISCONTINUED | OUTPATIENT
Start: 2023-08-06 | End: 2023-08-08

## 2023-08-06 RX ORDER — LORAZEPAM 1 MG/1
4 TABLET ORAL
Status: DISCONTINUED | OUTPATIENT
Start: 2023-08-06 | End: 2023-08-09 | Stop reason: HOSPADM

## 2023-08-06 RX ORDER — LEVETIRACETAM 750 MG/1
3 TABLET, EXTENDED RELEASE ORAL DAILY
Status: ON HOLD | COMMUNITY
Start: 2023-08-03 | End: 2023-08-09 | Stop reason: HOSPADM

## 2023-08-06 RX ORDER — SODIUM CHLORIDE 9 MG/ML
INJECTION, SOLUTION INTRAVENOUS PRN
Status: DISCONTINUED | OUTPATIENT
Start: 2023-08-06 | End: 2023-08-09 | Stop reason: HOSPADM

## 2023-08-06 RX ORDER — SODIUM CHLORIDE 0.9 % (FLUSH) 0.9 %
10 SYRINGE (ML) INJECTION EVERY 12 HOURS SCHEDULED
Status: DISCONTINUED | OUTPATIENT
Start: 2023-08-06 | End: 2023-08-09 | Stop reason: HOSPADM

## 2023-08-06 RX ORDER — LORAZEPAM 2 MG/ML
4 INJECTION INTRAMUSCULAR
Status: DISCONTINUED | OUTPATIENT
Start: 2023-08-06 | End: 2023-08-09 | Stop reason: HOSPADM

## 2023-08-06 RX ORDER — SODIUM CHLORIDE 0.9 % (FLUSH) 0.9 %
10 SYRINGE (ML) INJECTION PRN
Status: DISCONTINUED | OUTPATIENT
Start: 2023-08-06 | End: 2023-08-09 | Stop reason: HOSPADM

## 2023-08-06 RX ORDER — PANTOPRAZOLE SODIUM 40 MG/1
40 TABLET, DELAYED RELEASE ORAL
Status: DISCONTINUED | OUTPATIENT
Start: 2023-08-06 | End: 2023-08-09 | Stop reason: HOSPADM

## 2023-08-06 RX ORDER — ACETAMINOPHEN 650 MG/1
650 SUPPOSITORY RECTAL EVERY 6 HOURS PRN
Status: DISCONTINUED | OUTPATIENT
Start: 2023-08-06 | End: 2023-08-09 | Stop reason: HOSPADM

## 2023-08-06 RX ORDER — LORAZEPAM 1 MG/1
2 TABLET ORAL
Status: DISCONTINUED | OUTPATIENT
Start: 2023-08-06 | End: 2023-08-09 | Stop reason: HOSPADM

## 2023-08-06 RX ORDER — SENNOSIDES A AND B 8.6 MG/1
1 TABLET, FILM COATED ORAL DAILY PRN
Status: DISCONTINUED | OUTPATIENT
Start: 2023-08-06 | End: 2023-08-09 | Stop reason: HOSPADM

## 2023-08-06 RX ORDER — LORAZEPAM 2 MG/ML
2 INJECTION INTRAMUSCULAR
Status: DISCONTINUED | OUTPATIENT
Start: 2023-08-06 | End: 2023-08-09 | Stop reason: HOSPADM

## 2023-08-06 RX ORDER — LORAZEPAM 2 MG/ML
3 INJECTION INTRAMUSCULAR
Status: DISCONTINUED | OUTPATIENT
Start: 2023-08-06 | End: 2023-08-09 | Stop reason: HOSPADM

## 2023-08-06 RX ADMIN — CHLORDIAZEPOXIDE HYDROCHLORIDE 25 MG: 10 CAPSULE ORAL at 08:26

## 2023-08-06 RX ADMIN — LEVETIRACETAM 1000 MG: 10 INJECTION, SOLUTION INTRAVENOUS at 03:42

## 2023-08-06 RX ADMIN — SODIUM CHLORIDE: 9 INJECTION, SOLUTION INTRAVENOUS at 23:04

## 2023-08-06 RX ADMIN — CHLORDIAZEPOXIDE HYDROCHLORIDE 25 MG: 10 CAPSULE ORAL at 21:18

## 2023-08-06 RX ADMIN — ENOXAPARIN SODIUM 40 MG: 100 INJECTION SUBCUTANEOUS at 08:26

## 2023-08-06 RX ADMIN — SODIUM CHLORIDE: 9 INJECTION, SOLUTION INTRAVENOUS at 08:20

## 2023-08-06 RX ADMIN — SODIUM CHLORIDE, PRESERVATIVE FREE 10 ML: 5 INJECTION INTRAVENOUS at 08:26

## 2023-08-06 RX ADMIN — LEVETIRACETAM 1250 MG: 500 TABLET, FILM COATED ORAL at 21:17

## 2023-08-06 RX ADMIN — RISPERIDONE 1 MG: 0.25 TABLET, FILM COATED ORAL at 21:18

## 2023-08-06 RX ADMIN — THIAMINE HYDROCHLORIDE: 100 INJECTION, SOLUTION INTRAMUSCULAR; INTRAVENOUS at 09:35

## 2023-08-06 RX ADMIN — CHLORDIAZEPOXIDE HYDROCHLORIDE 25 MG: 10 CAPSULE ORAL at 16:53

## 2023-08-06 RX ADMIN — SODIUM CHLORIDE 1000 ML: 9 INJECTION, SOLUTION INTRAVENOUS at 03:42

## 2023-08-06 RX ADMIN — LEVETIRACETAM 1250 MG: 500 TABLET, FILM COATED ORAL at 08:25

## 2023-08-06 RX ADMIN — PANTOPRAZOLE SODIUM 40 MG: 40 TABLET, DELAYED RELEASE ORAL at 08:26

## 2023-08-06 ASSESSMENT — PAIN DESCRIPTION - DESCRIPTORS
DESCRIPTORS: ACHING
DESCRIPTORS: ACHING

## 2023-08-06 ASSESSMENT — PAIN DESCRIPTION - FREQUENCY
FREQUENCY: INTERMITTENT
FREQUENCY: INTERMITTENT

## 2023-08-06 ASSESSMENT — PAIN DESCRIPTION - PAIN TYPE
TYPE: ACUTE PAIN
TYPE: ACUTE PAIN

## 2023-08-06 ASSESSMENT — ENCOUNTER SYMPTOMS
DIARRHEA: 0
VOMITING: 0
SHORTNESS OF BREATH: 0
ABDOMINAL PAIN: 0
NAUSEA: 0
CHEST TIGHTNESS: 0

## 2023-08-06 ASSESSMENT — PAIN DESCRIPTION - LOCATION
LOCATION: FLANK
LOCATION: FLANK

## 2023-08-06 ASSESSMENT — PAIN SCALES - GENERAL
PAINLEVEL_OUTOF10: 6
PAINLEVEL_OUTOF10: 6

## 2023-08-06 ASSESSMENT — PAIN DESCRIPTION - ORIENTATION
ORIENTATION: RIGHT
ORIENTATION: RIGHT

## 2023-08-06 NOTE — H&P
[facility] Medicine History & Physical      Patient Name: Betito Sue    : 1965    PCP: Anish Red    Date of Service:  Patient seen and examined on 2023     Chief Complaint:  Seizure    History Of Present Illness:    Betito Sue is a 59 y.o. male who presented to ED for evaluation for breakthrough seizure. Family witnessed patient having a 27 second seizure tonight and called EMS. Family was at bedside earlier in ED and provided supplemental history but no family currently at bedside. Patient did not injure himself during seizure activity. He has a history of epilepsy. Patient reports he is only taking Dilantin. Per chart review, patient has been feeling Keppra and not Dilantin. He reports he did take his seizure medication this morning. He reports his last seizure was about 2 months ago. Patient has a history of alcohol abuse and his last drink was tonight. He denies any other complaints or concerns at this time. Past Medical History:    Patient has a past medical history of Alcohol abuse, Cancer ([address]), and Seizures ([address] Past Surgical History:    Patient has a past surgical history that includes Upper gastrointestinal endoscopy (N/A, 2021). Medications Prior to Admission:      Prior to Admission medications    Medication Sig Start Date End Date Taking?  Authorizing Provider   levETIRAcetam (KEPPRA XR) 750 MG TB24 extended release tablet Take 3 tablets by mouth daily 8/3/23   Historical Provider, MD   magnesium oxide (MAG-OX) 400 (240 Mg) MG tablet Take 1 tablet by mouth 2 times daily 22   James Escobar MD   pantoprazole (PROTONIX) 40 MG tablet Take 1 tablet by mouth every morning (before breakfast) 21   Maurice Balbuena MD   phenytoin (DILANTIN) 100 MG ER capsule Take 250 mg by mouth 2 times daily    Historical Provider, MD   potassium chloride (KLOR-CON M) 20 MEQ extended release tablet Take 2 tablets by mouth 2 times daily

## 2023-08-06 NOTE — PLAN OF CARE
Problem: Safety - Adult  Goal: Free from fall injury  Outcome: Progressing  Flowsheets (Taken 8/6/2023 1943)  Free From Fall Injury: Instruct family/caregiver on patient safety     Problem: Pain  Goal: Verbalizes/displays adequate comfort level or baseline comfort level  Outcome: Progressing   Educated on numerical pain scale and use of NPIs. Repositioning is effective for patient. Seizure and fall precautions in place with standard safety measures and video monitoring.

## 2023-08-07 ENCOUNTER — APPOINTMENT (OUTPATIENT)
Dept: MRI IMAGING | Age: 58
DRG: 100 | End: 2023-08-07
Payer: MEDICARE

## 2023-08-07 PROBLEM — E43 SEVERE MALNUTRITION (HCC): Chronic | Status: ACTIVE | Noted: 2023-08-07

## 2023-08-07 LAB
ANION GAP SERPL CALCULATED.3IONS-SCNC: 8 MMOL/L (ref 3–16)
BASOPHILS # BLD: 0 K/UL (ref 0–0.2)
BASOPHILS NFR BLD: 0.3 %
BUN SERPL-MCNC: 8 MG/DL (ref 7–20)
CALCIUM SERPL-MCNC: 9.1 MG/DL (ref 8.3–10.6)
CHLORIDE SERPL-SCNC: 107 MMOL/L (ref 99–110)
CO2 SERPL-SCNC: 22 MMOL/L (ref 21–32)
CREAT SERPL-MCNC: 0.8 MG/DL (ref 0.8–1.3)
DEPRECATED RDW RBC AUTO: 15.2 % (ref 12.4–15.4)
EOSINOPHIL # BLD: 0.1 K/UL (ref 0–0.6)
EOSINOPHIL NFR BLD: 4 %
GFR SERPLBLD CREATININE-BSD FMLA CKD-EPI: >60 ML/MIN/{1.73_M2}
GLUCOSE SERPL-MCNC: 108 MG/DL (ref 70–99)
HCT VFR BLD AUTO: 35.2 % (ref 40.5–52.5)
HGB BLD-MCNC: 11.5 G/DL (ref 13.5–17.5)
LYMPHOCYTES # BLD: 1.5 K/UL (ref 1–5.1)
LYMPHOCYTES NFR BLD: 41.5 %
MCH RBC QN AUTO: 30.5 PG (ref 26–34)
MCHC RBC AUTO-ENTMCNC: 32.8 G/DL (ref 31–36)
MCV RBC AUTO: 93.1 FL (ref 80–100)
MONOCYTES # BLD: 0.3 K/UL (ref 0–1.3)
MONOCYTES NFR BLD: 7.8 %
NEUTROPHILS # BLD: 1.7 K/UL (ref 1.7–7.7)
NEUTROPHILS NFR BLD: 46.4 %
PHENYTOIN FREE MFR SERPL: ABNORMAL % (ref 8–14)
PHENYTOIN FREE SERPL-MCNC: <0.5 UG/ML (ref 1–2.5)
PHENYTOIN SERPL-MCNC: <0.5 UG/ML (ref 10–20)
PLATELET # BLD AUTO: 74 K/UL (ref 135–450)
PMV BLD AUTO: 7.1 FL (ref 5–10.5)
POTASSIUM SERPL-SCNC: 3.8 MMOL/L (ref 3.5–5.1)
RBC # BLD AUTO: 3.78 M/UL (ref 4.2–5.9)
SODIUM SERPL-SCNC: 137 MMOL/L (ref 136–145)
WBC # BLD AUTO: 3.7 K/UL (ref 4–11)

## 2023-08-07 PROCEDURE — 80048 BASIC METABOLIC PNL TOTAL CA: CPT

## 2023-08-07 PROCEDURE — 36415 COLL VENOUS BLD VENIPUNCTURE: CPT

## 2023-08-07 PROCEDURE — 85025 COMPLETE CBC W/AUTO DIFF WBC: CPT

## 2023-08-07 PROCEDURE — 95819 EEG AWAKE AND ASLEEP: CPT

## 2023-08-07 PROCEDURE — 6360000002 HC RX W HCPCS: Performed by: PHYSICIAN ASSISTANT

## 2023-08-07 PROCEDURE — 99223 1ST HOSP IP/OBS HIGH 75: CPT | Performed by: PSYCHIATRY & NEUROLOGY

## 2023-08-07 PROCEDURE — 6370000000 HC RX 637 (ALT 250 FOR IP): Performed by: INTERNAL MEDICINE

## 2023-08-07 PROCEDURE — 6370000000 HC RX 637 (ALT 250 FOR IP): Performed by: PHYSICIAN ASSISTANT

## 2023-08-07 PROCEDURE — 1200000000 HC SEMI PRIVATE

## 2023-08-07 PROCEDURE — 70551 MRI BRAIN STEM W/O DYE: CPT

## 2023-08-07 PROCEDURE — 95816 EEG AWAKE AND DROWSY: CPT | Performed by: PSYCHIATRY & NEUROLOGY

## 2023-08-07 PROCEDURE — 94760 N-INVAS EAR/PLS OXIMETRY 1: CPT

## 2023-08-07 PROCEDURE — 2500000003 HC RX 250 WO HCPCS: Performed by: PHYSICIAN ASSISTANT

## 2023-08-07 PROCEDURE — 2580000003 HC RX 258: Performed by: PHYSICIAN ASSISTANT

## 2023-08-07 RX ORDER — CHLORDIAZEPOXIDE HYDROCHLORIDE 5 MG/1
5 CAPSULE, GELATIN COATED ORAL 3 TIMES DAILY
Status: DISCONTINUED | OUTPATIENT
Start: 2023-08-07 | End: 2023-08-09 | Stop reason: HOSPADM

## 2023-08-07 RX ADMIN — LEVETIRACETAM 1250 MG: 500 TABLET, FILM COATED ORAL at 09:12

## 2023-08-07 RX ADMIN — CHLORDIAZEPOXIDE HYDROCHLORIDE 5 MG: 5 CAPSULE ORAL at 16:58

## 2023-08-07 RX ADMIN — CHLORDIAZEPOXIDE HYDROCHLORIDE 5 MG: 5 CAPSULE ORAL at 22:15

## 2023-08-07 RX ADMIN — CHLORDIAZEPOXIDE HYDROCHLORIDE 25 MG: 10 CAPSULE ORAL at 09:12

## 2023-08-07 RX ADMIN — ENOXAPARIN SODIUM 40 MG: 100 INJECTION SUBCUTANEOUS at 09:12

## 2023-08-07 RX ADMIN — SODIUM CHLORIDE, PRESERVATIVE FREE 10 ML: 5 INJECTION INTRAVENOUS at 09:12

## 2023-08-07 RX ADMIN — LEVETIRACETAM 1250 MG: 500 TABLET, FILM COATED ORAL at 22:14

## 2023-08-07 RX ADMIN — PANTOPRAZOLE SODIUM 40 MG: 40 TABLET, DELAYED RELEASE ORAL at 09:14

## 2023-08-07 RX ADMIN — THIAMINE HYDROCHLORIDE: 100 INJECTION, SOLUTION INTRAMUSCULAR; INTRAVENOUS at 10:23

## 2023-08-07 RX ADMIN — SODIUM CHLORIDE, PRESERVATIVE FREE 10 ML: 5 INJECTION INTRAVENOUS at 22:15

## 2023-08-07 RX ADMIN — RISPERIDONE 1 MG: 0.25 TABLET, FILM COATED ORAL at 22:14

## 2023-08-07 ASSESSMENT — PAIN SCALES - GENERAL
PAINLEVEL_OUTOF10: 0
PAINLEVEL_OUTOF10: 0

## 2023-08-07 NOTE — PROCEDURES
Patient: Lorelei Parr    MR Number: [id number]  YOB: 1965  Date of Visit: 8/7/2023    Clinical History:  The patient is a 59y.o. years old male with breakthrough seizure. Medications reviewed      Method: The EEG was performed utilizing the international 10/20 of electrode placements of both referential and bipolar montages. The patient was awake and drowsy through out the recording. Photic stimulation was performed. Findings: The background of the EEG showed normal alpha posterior background of 8-9  HZ and amplitude of 20-40 UV. This background was symmetric, waxing and waning, and reactive with eye opening and closure. As the patient became drowsy, generalized diffuse slowing was seen through recording at 6-7 HZ. This generalized slowing was symmetric, non rhythmical, and continuous. No spike or sharp waves were seen. Photic stimulation did not activate EEG. Impression: This EEG  is within normal limits. There is no evidence of epileptiform discharges, focal, or lateralizing abnormalities.       Johana Prince MD      Board certified in clinical neurophysiology

## 2023-08-08 LAB
ANION GAP SERPL CALCULATED.3IONS-SCNC: 9 MMOL/L (ref 3–16)
BASOPHILS # BLD: 0 K/UL (ref 0–0.2)
BASOPHILS NFR BLD: 0.4 %
BUN SERPL-MCNC: 7 MG/DL (ref 7–20)
CALCIUM SERPL-MCNC: 9.9 MG/DL (ref 8.3–10.6)
CHLORIDE SERPL-SCNC: 104 MMOL/L (ref 99–110)
CO2 SERPL-SCNC: 25 MMOL/L (ref 21–32)
CREAT SERPL-MCNC: 0.9 MG/DL (ref 0.8–1.3)
DEPRECATED RDW RBC AUTO: 15.1 % (ref 12.4–15.4)
EOSINOPHIL # BLD: 0.2 K/UL (ref 0–0.6)
EOSINOPHIL NFR BLD: 4 %
GFR SERPLBLD CREATININE-BSD FMLA CKD-EPI: >60 ML/MIN/{1.73_M2}
GLUCOSE SERPL-MCNC: 104 MG/DL (ref 70–99)
HCT VFR BLD AUTO: 37.6 % (ref 40.5–52.5)
HGB BLD-MCNC: 12.3 G/DL (ref 13.5–17.5)
LV EF: 70 %
LVEF MODALITY: NORMAL
LYMPHOCYTES # BLD: 1.7 K/UL (ref 1–5.1)
LYMPHOCYTES NFR BLD: 37.7 %
MCH RBC QN AUTO: 30.4 PG (ref 26–34)
MCHC RBC AUTO-ENTMCNC: 32.8 G/DL (ref 31–36)
MCV RBC AUTO: 92.5 FL (ref 80–100)
MONOCYTES # BLD: 0.3 K/UL (ref 0–1.3)
MONOCYTES NFR BLD: 7.4 %
NEUTROPHILS # BLD: 2.2 K/UL (ref 1.7–7.7)
NEUTROPHILS NFR BLD: 50.5 %
PLATELET # BLD AUTO: 69 K/UL (ref 135–450)
PMV BLD AUTO: 7.6 FL (ref 5–10.5)
POTASSIUM SERPL-SCNC: 4.1 MMOL/L (ref 3.5–5.1)
RBC # BLD AUTO: 4.07 M/UL (ref 4.2–5.9)
SODIUM SERPL-SCNC: 138 MMOL/L (ref 136–145)
WBC # BLD AUTO: 4.4 K/UL (ref 4–11)

## 2023-08-08 PROCEDURE — 93306 TTE W/DOPPLER COMPLETE: CPT

## 2023-08-08 PROCEDURE — 97535 SELF CARE MNGMENT TRAINING: CPT

## 2023-08-08 PROCEDURE — 1200000000 HC SEMI PRIVATE

## 2023-08-08 PROCEDURE — 92526 ORAL FUNCTION THERAPY: CPT

## 2023-08-08 PROCEDURE — 6370000000 HC RX 637 (ALT 250 FOR IP): Performed by: PHYSICIAN ASSISTANT

## 2023-08-08 PROCEDURE — 2500000003 HC RX 250 WO HCPCS: Performed by: PHYSICIAN ASSISTANT

## 2023-08-08 PROCEDURE — 93880 EXTRACRANIAL BILAT STUDY: CPT

## 2023-08-08 PROCEDURE — 36415 COLL VENOUS BLD VENIPUNCTURE: CPT

## 2023-08-08 PROCEDURE — 97162 PT EVAL MOD COMPLEX 30 MIN: CPT

## 2023-08-08 PROCEDURE — 97116 GAIT TRAINING THERAPY: CPT

## 2023-08-08 PROCEDURE — 6360000002 HC RX W HCPCS: Performed by: PHYSICIAN ASSISTANT

## 2023-08-08 PROCEDURE — 2580000003 HC RX 258: Performed by: PHYSICIAN ASSISTANT

## 2023-08-08 PROCEDURE — 99233 SBSQ HOSP IP/OBS HIGH 50: CPT

## 2023-08-08 PROCEDURE — 2580000003 HC RX 258: Performed by: INTERNAL MEDICINE

## 2023-08-08 PROCEDURE — 6370000000 HC RX 637 (ALT 250 FOR IP): Performed by: INTERNAL MEDICINE

## 2023-08-08 PROCEDURE — 80048 BASIC METABOLIC PNL TOTAL CA: CPT

## 2023-08-08 PROCEDURE — 97530 THERAPEUTIC ACTIVITIES: CPT

## 2023-08-08 PROCEDURE — 97166 OT EVAL MOD COMPLEX 45 MIN: CPT

## 2023-08-08 PROCEDURE — 92610 EVALUATE SWALLOWING FUNCTION: CPT

## 2023-08-08 PROCEDURE — 85025 COMPLETE CBC W/AUTO DIFF WBC: CPT

## 2023-08-08 RX ORDER — SODIUM CHLORIDE 9 MG/ML
INJECTION, SOLUTION INTRAVENOUS PRN
Status: DISCONTINUED | OUTPATIENT
Start: 2023-08-08 | End: 2023-08-09 | Stop reason: HOSPADM

## 2023-08-08 RX ORDER — SODIUM CHLORIDE 0.9 % (FLUSH) 0.9 %
5-40 SYRINGE (ML) INJECTION EVERY 12 HOURS SCHEDULED
Status: DISCONTINUED | OUTPATIENT
Start: 2023-08-08 | End: 2023-08-09 | Stop reason: HOSPADM

## 2023-08-08 RX ORDER — SODIUM CHLORIDE 0.9 % (FLUSH) 0.9 %
5-40 SYRINGE (ML) INJECTION PRN
Status: DISCONTINUED | OUTPATIENT
Start: 2023-08-08 | End: 2023-08-09 | Stop reason: HOSPADM

## 2023-08-08 RX ORDER — POLYETHYLENE GLYCOL 3350 17 G/17G
17 POWDER, FOR SOLUTION ORAL DAILY PRN
Status: DISCONTINUED | OUTPATIENT
Start: 2023-08-08 | End: 2023-08-09 | Stop reason: HOSPADM

## 2023-08-08 RX ORDER — ATORVASTATIN CALCIUM 40 MG/1
40 TABLET, FILM COATED ORAL DAILY
Status: DISCONTINUED | OUTPATIENT
Start: 2023-08-08 | End: 2023-08-09 | Stop reason: HOSPADM

## 2023-08-08 RX ORDER — ONDANSETRON 2 MG/ML
4 INJECTION INTRAMUSCULAR; INTRAVENOUS EVERY 6 HOURS PRN
Status: DISCONTINUED | OUTPATIENT
Start: 2023-08-08 | End: 2023-08-09 | Stop reason: HOSPADM

## 2023-08-08 RX ORDER — ONDANSETRON 4 MG/1
4 TABLET, ORALLY DISINTEGRATING ORAL EVERY 8 HOURS PRN
Status: DISCONTINUED | OUTPATIENT
Start: 2023-08-08 | End: 2023-08-09 | Stop reason: HOSPADM

## 2023-08-08 RX ORDER — ASPIRIN 81 MG/1
81 TABLET, CHEWABLE ORAL DAILY
Status: DISCONTINUED | OUTPATIENT
Start: 2023-08-08 | End: 2023-08-09 | Stop reason: HOSPADM

## 2023-08-08 RX ADMIN — SODIUM CHLORIDE, PRESERVATIVE FREE 10 ML: 5 INJECTION INTRAVENOUS at 08:36

## 2023-08-08 RX ADMIN — ENOXAPARIN SODIUM 40 MG: 100 INJECTION SUBCUTANEOUS at 08:36

## 2023-08-08 RX ADMIN — RISPERIDONE 1 MG: 0.25 TABLET, FILM COATED ORAL at 20:58

## 2023-08-08 RX ADMIN — PANTOPRAZOLE SODIUM 40 MG: 40 TABLET, DELAYED RELEASE ORAL at 06:43

## 2023-08-08 RX ADMIN — ASPIRIN 81 MG 81 MG: 81 TABLET ORAL at 08:35

## 2023-08-08 RX ADMIN — SODIUM CHLORIDE, PRESERVATIVE FREE 10 ML: 5 INJECTION INTRAVENOUS at 11:01

## 2023-08-08 RX ADMIN — LEVETIRACETAM 1250 MG: 500 TABLET, FILM COATED ORAL at 20:57

## 2023-08-08 RX ADMIN — CHLORDIAZEPOXIDE HYDROCHLORIDE 5 MG: 5 CAPSULE ORAL at 08:35

## 2023-08-08 RX ADMIN — ATORVASTATIN CALCIUM 40 MG: 40 TABLET, FILM COATED ORAL at 08:35

## 2023-08-08 RX ADMIN — THIAMINE HYDROCHLORIDE: 100 INJECTION, SOLUTION INTRAMUSCULAR; INTRAVENOUS at 10:59

## 2023-08-08 RX ADMIN — SODIUM CHLORIDE, PRESERVATIVE FREE 10 ML: 5 INJECTION INTRAVENOUS at 20:58

## 2023-08-08 RX ADMIN — CHLORDIAZEPOXIDE HYDROCHLORIDE 5 MG: 5 CAPSULE ORAL at 20:58

## 2023-08-08 RX ADMIN — CHLORDIAZEPOXIDE HYDROCHLORIDE 5 MG: 5 CAPSULE ORAL at 16:08

## 2023-08-08 RX ADMIN — LEVETIRACETAM 1250 MG: 500 TABLET, FILM COATED ORAL at 08:35

## 2023-08-08 ASSESSMENT — PAIN SCALES - GENERAL
PAINLEVEL_OUTOF10: 0
PAINLEVEL_OUTOF10: 0

## 2023-08-08 NOTE — PLAN OF CARE
Problem: Discharge Planning  Goal: Discharge to home or other facility with appropriate resources  Outcome: Progressing     Problem: Safety - Adult  Goal: Free from fall injury  Outcome: Progressing     Problem: Pain  Goal: Verbalizes/displays adequate comfort level or baseline comfort level  Outcome: Progressing     Problem: Musculoskeletal - Adult  Goal: Return mobility to safest level of function  Outcome: Progressing     Problem: Musculoskeletal - Adult  Goal: Return ADL status to a safe level of function  Outcome: Progressing     Problem: Decision Making  Goal: Pt/Family able to effectively weigh alternatives and participate in decision making related to treatment and care  Description: INTERVENTIONS:  1. Determine when there are differences between patient's view, family's view, and healthcare provider's view of condition  2. Facilitate patient and family articulation of goals for care  3. Help patient and family identify pros/cons of alternative solutions  4. Provide information as requested by patient/family  5. Respect patient/family right to receive or not to receive information  6. Serve as a liaison between patient and family and health care team  7. Initiate Consults from Ethics, Palliative Care or initiate [address] as is appropriate  Outcome: Progressing     Problem: Behavior  Goal: Pt/Family maintain appropriate behavior and adhere to behavioral management agreement, if implemented  Description: INTERVENTIONS:  1. Assess patient/family's coping skills and  non-compliant behavior (including use of illegal substances)  2. Notify security of behavior or suspected illegal substances which indicate the need for search of the family and/or belongings  3. Encourage verbalization of thoughts and concerns in a socially appropriate manner  4. Utilize positive, consistent limit setting strategies supporting safety of patient, staff and others  5.  Encourage participation in the decision making

## 2023-08-08 NOTE — CARE COORDINATION
Discharge Planning:     (CM) rec'd a consult \"For consideration of rehab\" Laila Sorensen Substance Use Navigator and [place] came and spoke with Patient who is agreeable to rehab. Patient was working with therapy and Laila advised she would be back later to see him. She advised Patient is located in [facility] and she will make a referral to [facility] for outpatient services. Laila advised she would discuss with Patient further this afternoon. CM team to follow.      Electronically signed by JEAN Monte on 8/8/2023 at 8:53 AM

## 2023-08-08 NOTE — CARE COORDINATION
Discharge Planning:     (CM) ARU consult pending. CM team to follow.      Electronically signed by JEAN Diallo on 8/8/2023 at 3:03 PM

## 2023-08-09 VITALS
TEMPERATURE: 98 F | SYSTOLIC BLOOD PRESSURE: 93 MMHG | HEART RATE: 87 BPM | WEIGHT: 98 LBS | OXYGEN SATURATION: 98 % | BODY MASS INDEX: 13.72 KG/M2 | DIASTOLIC BLOOD PRESSURE: 62 MMHG | HEIGHT: 71 IN | RESPIRATION RATE: 15 BRPM

## 2023-08-09 LAB
ANION GAP SERPL CALCULATED.3IONS-SCNC: 6 MMOL/L (ref 3–16)
BASOPHILS # BLD: 0 K/UL (ref 0–0.2)
BASOPHILS NFR BLD: 0.4 %
BUN SERPL-MCNC: 13 MG/DL (ref 7–20)
CALCIUM SERPL-MCNC: 9.9 MG/DL (ref 8.3–10.6)
CHLORIDE SERPL-SCNC: 101 MMOL/L (ref 99–110)
CHOLEST SERPL-MCNC: 128 MG/DL (ref 0–199)
CO2 SERPL-SCNC: 29 MMOL/L (ref 21–32)
CREAT SERPL-MCNC: 1 MG/DL (ref 0.8–1.3)
DEPRECATED RDW RBC AUTO: 15.2 % (ref 12.4–15.4)
EOSINOPHIL # BLD: 0.2 K/UL (ref 0–0.6)
EOSINOPHIL NFR BLD: 3.7 %
GFR SERPLBLD CREATININE-BSD FMLA CKD-EPI: >60 ML/MIN/{1.73_M2}
GLUCOSE SERPL-MCNC: 105 MG/DL (ref 70–99)
HCT VFR BLD AUTO: 31.9 % (ref 40.5–52.5)
HDLC SERPL-MCNC: 90 MG/DL (ref 40–60)
HGB BLD-MCNC: 10.5 G/DL (ref 13.5–17.5)
LDLC SERPL CALC-MCNC: 32 MG/DL
LYMPHOCYTES # BLD: 2.2 K/UL (ref 1–5.1)
LYMPHOCYTES NFR BLD: 34.1 %
MCH RBC QN AUTO: 30.4 PG (ref 26–34)
MCHC RBC AUTO-ENTMCNC: 33 G/DL (ref 31–36)
MCV RBC AUTO: 92.3 FL (ref 80–100)
MONOCYTES # BLD: 0.6 K/UL (ref 0–1.3)
MONOCYTES NFR BLD: 8.9 %
NEUTROPHILS # BLD: 3.4 K/UL (ref 1.7–7.7)
NEUTROPHILS NFR BLD: 52.9 %
PLATELET # BLD AUTO: 70 K/UL (ref 135–450)
PMV BLD AUTO: 7.9 FL (ref 5–10.5)
POTASSIUM SERPL-SCNC: 4 MMOL/L (ref 3.5–5.1)
RBC # BLD AUTO: 3.46 M/UL (ref 4.2–5.9)
SODIUM SERPL-SCNC: 136 MMOL/L (ref 136–145)
TRIGL SERPL-MCNC: 32 MG/DL (ref 0–150)
VLDLC SERPL CALC-MCNC: 6 MG/DL
WBC # BLD AUTO: 6.4 K/UL (ref 4–11)

## 2023-08-09 PROCEDURE — 99232 SBSQ HOSP IP/OBS MODERATE 35: CPT

## 2023-08-09 PROCEDURE — 80048 BASIC METABOLIC PNL TOTAL CA: CPT

## 2023-08-09 PROCEDURE — 6370000000 HC RX 637 (ALT 250 FOR IP): Performed by: PHYSICIAN ASSISTANT

## 2023-08-09 PROCEDURE — 83036 HEMOGLOBIN GLYCOSYLATED A1C: CPT

## 2023-08-09 PROCEDURE — 85025 COMPLETE CBC W/AUTO DIFF WBC: CPT

## 2023-08-09 PROCEDURE — 97535 SELF CARE MNGMENT TRAINING: CPT

## 2023-08-09 PROCEDURE — 92523 SPEECH SOUND LANG COMPREHEN: CPT

## 2023-08-09 PROCEDURE — 92526 ORAL FUNCTION THERAPY: CPT

## 2023-08-09 PROCEDURE — 6370000000 HC RX 637 (ALT 250 FOR IP): Performed by: INTERNAL MEDICINE

## 2023-08-09 PROCEDURE — 80061 LIPID PANEL: CPT

## 2023-08-09 PROCEDURE — 97530 THERAPEUTIC ACTIVITIES: CPT

## 2023-08-09 PROCEDURE — 36415 COLL VENOUS BLD VENIPUNCTURE: CPT

## 2023-08-09 PROCEDURE — 6360000002 HC RX W HCPCS: Performed by: PHYSICIAN ASSISTANT

## 2023-08-09 PROCEDURE — 2580000003 HC RX 258: Performed by: INTERNAL MEDICINE

## 2023-08-09 PROCEDURE — 97116 GAIT TRAINING THERAPY: CPT

## 2023-08-09 PROCEDURE — 2580000003 HC RX 258: Performed by: PHYSICIAN ASSISTANT

## 2023-08-09 RX ORDER — ATORVASTATIN CALCIUM 40 MG/1
40 TABLET, FILM COATED ORAL DAILY
Qty: 30 TABLET | Refills: 3 | Status: SHIPPED | OUTPATIENT
Start: 2023-08-10

## 2023-08-09 RX ORDER — ASPIRIN 81 MG/1
81 TABLET, CHEWABLE ORAL DAILY
Qty: 30 TABLET | Refills: 3 | Status: SHIPPED | OUTPATIENT
Start: 2023-08-10

## 2023-08-09 RX ORDER — LEVETIRACETAM 250 MG/1
1250 TABLET ORAL 2 TIMES DAILY
Qty: 60 TABLET | Refills: 3 | Status: SHIPPED | OUTPATIENT
Start: 2023-08-09

## 2023-08-09 RX ADMIN — PANTOPRAZOLE SODIUM 40 MG: 40 TABLET, DELAYED RELEASE ORAL at 06:10

## 2023-08-09 RX ADMIN — ASPIRIN 81 MG 81 MG: 81 TABLET ORAL at 08:36

## 2023-08-09 RX ADMIN — SODIUM CHLORIDE, PRESERVATIVE FREE 10 ML: 5 INJECTION INTRAVENOUS at 08:37

## 2023-08-09 RX ADMIN — ENOXAPARIN SODIUM 40 MG: 100 INJECTION SUBCUTANEOUS at 08:36

## 2023-08-09 RX ADMIN — LEVETIRACETAM 1250 MG: 500 TABLET, FILM COATED ORAL at 08:37

## 2023-08-09 RX ADMIN — CHLORDIAZEPOXIDE HYDROCHLORIDE 5 MG: 5 CAPSULE ORAL at 15:25

## 2023-08-09 RX ADMIN — CHLORDIAZEPOXIDE HYDROCHLORIDE 5 MG: 5 CAPSULE ORAL at 08:36

## 2023-08-09 RX ADMIN — ATORVASTATIN CALCIUM 40 MG: 40 TABLET, FILM COATED ORAL at 08:36

## 2023-08-09 NOTE — CARE COORDINATION
Discharge Planning Note Re: [address]     CM/SW noted consult for discharge planning. Chart reviewed. Noted recommendations for home health care. CM met with patient and LVM w/ Alicia patient sister [phone]. Introduced self and explained role of CM and discharge planning. Patient is agreeable to home health on dc. Patient had no preference of [facility]. Referrals provided to   [facility] with [facility] . Naresh Hussein accepted Patient. CM reported patient is discharge to his sister's home in [facility]. [facility] and will verify address, if not noted by CM today. Referral made to [address] [place] [address] order for  [x]RN [x]PT  [x]OT  []HHA  []SW  []  SLP    CM/SW will follow-up on referrals and provide any additional documentation necessary to facilitate placement. Electronically signed by Levoally Perea on 8/9/2023 at 3:14 PM     Patient's sister verfied her address :  [address] [place] [address]  [id number]    CM called Estelita Whitten at [facility] to request rolling walker. CM placed request to Dr. Honey Sandoval for order for DME.      Electronically signed by Jana Perea on 8/9/2023 at 4:16 PM

## 2023-08-09 NOTE — PLAN OF CARE
Problem: Discharge Planning  Goal: Discharge to home or other facility with appropriate resources  Outcome: Adequate for Discharge  Flowsheets (Taken 8/9/2023 9733)  Discharge to home or other facility with appropriate resources: Identify barriers to discharge with patient and caregiver     Problem: Safety - Adult  Goal: Free from fall injury  Outcome: Adequate for Discharge     Problem: Pain  Goal: Verbalizes/displays adequate comfort level or baseline comfort level  Outcome: Adequate for Discharge     Problem: Musculoskeletal - Adult  Goal: Return mobility to safest level of function  Outcome: Adequate for Discharge  Flowsheets (Taken 8/9/2023 0872)  Return Mobility to Safest Level of Function: Assess patient stability and activity tolerance for standing, transferring and ambulating with or without assistive devices     Problem: Musculoskeletal - Adult  Goal: Return ADL status to a safe level of function  Outcome: Adequate for Discharge     Problem: Decision Making  Goal: Pt/Family able to effectively weigh alternatives and participate in decision making related to treatment and care  Description: INTERVENTIONS:  1. Determine when there are differences between patient's view, family's view, and healthcare provider's view of condition  2. Facilitate patient and family articulation of goals for care  3. Help patient and family identify pros/cons of alternative solutions  4. Provide information as requested by patient/family  5. Respect patient/family right to receive or not to receive information  6. Serve as a liaison between patient and family and health care team  7. Initiate Consults from Ethics, Palliative Care or initiate [address] as is appropriate  Outcome: Adequate for Discharge     Problem: Behavior  Goal: Pt/Family maintain appropriate behavior and adhere to behavioral management agreement, if implemented  Description: INTERVENTIONS:  1.  Assess patient/family's coping skills and

## 2023-08-09 NOTE — PLAN OF CARE
Problem: Discharge Planning  Goal: Discharge to home or other facility with appropriate resources  8/8/2023 2202 by Jv Reis RN  Outcome: Progressing  Flowsheets (Taken 8/8/2023 2000)  Discharge to home or other facility with appropriate resources: Identify barriers to discharge with patient and caregiver  8/8/2023 1635 by Joan Salcedo RN  Outcome: Progressing     Problem: Safety - Adult  Goal: Free from fall injury  8/8/2023 2202 by Jv Reis RN  Outcome: Progressing  8/8/2023 1635 by Joan Salcedo RN  Outcome: Progressing     Problem: Pain  Goal: Verbalizes/displays adequate comfort level or baseline comfort level  8/8/2023 2202 by Jv Reis RN  Outcome: Progressing  Flowsheets (Taken 8/8/2023 2000)  Verbalizes/displays adequate comfort level or baseline comfort level: Assess pain using appropriate pain scale  8/8/2023 1635 by Joan Salcedo RN  Outcome: Progressing     Problem: Musculoskeletal - Adult  Goal: Return mobility to safest level of function  8/8/2023 2202 by Jv Reis RN  Outcome: Progressing  Flowsheets (Taken 8/8/2023 2000)  Return Mobility to Safest Level of Function:   Assess patient stability and activity tolerance for standing, transferring and ambulating with or without assistive devices   Assist with transfers and ambulation using safe patient handling equipment as needed  8/8/2023 1635 by Joan Salcedo RN  Outcome: Progressing     Problem: Musculoskeletal - Adult  Goal: Return ADL status to a safe level of function  8/8/2023 1635 by Joan Salcedo RN  Outcome: Progressing     Problem: Neurosensory - Adult  Goal: Absence of seizures  8/8/2023 2202 by Jv Reis RN  Outcome: Progressing  Flowsheets (Taken 8/8/2023 2000)  Absence of seizures: Monitor for seizure activity.   If seizure occurs, document type and location of movements and any associated apnea  8/8/2023 1635 by Joan Salcedo RN  Outcome: Progressing     Problem: Neurosensory - Adult  Goal: Remains free of injury

## 2023-08-09 NOTE — CARE COORDINATION
08/09/23 1454   IMM Letter   IMM Letter given to Patient/Family/Significant other/Guardian/POA/by: CM provided copy of IMM   IMM Letter date given: 08/09/23   IMM Letter time given: 1454     Electronically signed by Yaakov Sinclair on 8/9/2023 at 2:54 PM

## 2023-08-09 NOTE — DISCHARGE INSTRUCTIONS
Return to clinic or ED if you develop fever greater than 101.4 F, chills, night sweats, severe nausea/vomiting, unusual bleeding, increasing pain unresponsive to medications, chest pain or difficulty breathing.

## 2023-08-09 NOTE — CARE COORDINATION
CM to bedside for IMM. Patient reports he no longer wants to go ARU, and that his sister will drive him to OP PT. CM placed call to [facility] he is aware of patient going home. Nursing aware as well. JUNIOR sent message to Dr. Eliceo Ni requesting OP PT orders.      Electronically signed by Ace Bedoya on 8/9/2023 at 2:53 PM

## 2023-08-09 NOTE — DISCHARGE INSTR - COC
Continuity of Care Form    Patient Name: Lashaun Vazquez   :  1965  MRN:  [id number]    Admit date:  2023  Discharge date:  2023    Code Status Order: Full Code   Advance Directives:     Admitting Physician:  Duane Ponce MD  PCP: Jaspal Almanzar    Discharging Nurse: [facility] Unit/Room#: 6NZ-1132/0964-74  Discharging Unit Phone Number: [phone]    Emergency Contact:   Extended Emergency Contact Information  Primary Emergency Contact: Nila Bennett  Address: -[address] AT [facility] Phone: [phone]  Relation: Other  Secondary Emergency Contact: Rossy Beasley  Home Phone: [phone]  Relation: Parent    Past Surgical History:  Past Surgical History:   Procedure Laterality Date    UPPER GASTROINTESTINAL ENDOSCOPY N/A 2021    EGD GASTRIC BIOPSY R/O HPYLORI performed by Rock Bashir MD at [address] [place] [address] ENDOSCOPY       Immunization History: There is no immunization history for the selected administration types on file for this patient.     Active Problems:  Patient Active Problem List   Diagnosis Code    Epilepsy, focal ([address]) G40.109    Noncompliance with medication regimen Z91.148    Seizures ([address]) R56.9    Alcohol abuse F10.10    Pneumonia J18.9    Nausea and vomiting R11.2    Breakthrough seizure ([address]) G40.919    Acute febrile illness R50.9    Sepsis ([address]) A41.9    Lactic acid acidosis E87.20    Smoker F17.200    Cerebrovascular small vessel disease I67.9    Streptococcal bacteremia R78.81, B95.5    Streptococcus viridans infection A49.1    Alcohol cessation counseling Z71.41    Tobacco abuse counseling Z71.6    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

## 2023-08-10 LAB
EST. AVERAGE GLUCOSE BLD GHB EST-MCNC: 111.2 MG/DL
HBA1C MFR BLD: 5.5 %

## 2024-10-30 ENCOUNTER — HOSPITAL ENCOUNTER (INPATIENT)
Age: 59
LOS: 3 days | Discharge: HOME HEALTH | DRG: 432 | End: 2024-11-02
Attending: EMERGENCY MEDICINE | Admitting: INTERNAL MEDICINE
Payer: MEDICARE

## 2024-10-30 ENCOUNTER — APPOINTMENT (OUTPATIENT)
Dept: ULTRASOUND IMAGING | Age: 59
DRG: 432 | End: 2024-10-30
Payer: MEDICARE

## 2024-10-30 ENCOUNTER — APPOINTMENT (OUTPATIENT)
Dept: CT IMAGING | Age: 59
DRG: 432 | End: 2024-10-30
Payer: MEDICARE

## 2024-10-30 DIAGNOSIS — K72.00 ACUTE LIVER FAILURE WITHOUT HEPATIC COMA: Primary | ICD-10-CM

## 2024-10-30 DIAGNOSIS — K74.60 CIRRHOSIS OF LIVER WITH ASCITES, UNSPECIFIED HEPATIC CIRRHOSIS TYPE (HCC): ICD-10-CM

## 2024-10-30 DIAGNOSIS — K70.11 ASCITES DUE TO ALCOHOLIC HEPATITIS: ICD-10-CM

## 2024-10-30 DIAGNOSIS — R74.01 TRANSAMINITIS: ICD-10-CM

## 2024-10-30 DIAGNOSIS — K76.6 PORTAL HYPERTENSION (HCC): ICD-10-CM

## 2024-10-30 DIAGNOSIS — R63.0 DECREASED APPETITE: ICD-10-CM

## 2024-10-30 DIAGNOSIS — R18.8 CIRRHOSIS OF LIVER WITH ASCITES, UNSPECIFIED HEPATIC CIRRHOSIS TYPE (HCC): ICD-10-CM

## 2024-10-30 PROBLEM — K72.90 DECOMPENSATION OF CIRRHOSIS OF LIVER (HCC): Status: ACTIVE | Noted: 2024-10-30

## 2024-10-30 LAB
ALBUMIN SERPL-MCNC: 3 G/DL (ref 3.4–5)
ALBUMIN/GLOB SERPL: 0.8 {RATIO} (ref 1.1–2.2)
ALP SERPL-CCNC: 122 U/L (ref 40–129)
ALT SERPL-CCNC: 124 U/L (ref 10–40)
AMMONIA PLAS-SCNC: 38 UMOL/L (ref 16–60)
ANION GAP SERPL CALCULATED.3IONS-SCNC: 15 MMOL/L (ref 3–16)
ANISOCYTOSIS BLD QL SMEAR: ABNORMAL
AST SERPL-CCNC: 236 U/L (ref 15–37)
BACTERIA URNS QL MICRO: ABNORMAL /HPF
BASOPHILS # BLD: 0 K/UL (ref 0–0.2)
BASOPHILS NFR BLD: 0.5 %
BILIRUB SERPL-MCNC: 1.2 MG/DL (ref 0–1)
BILIRUB UR QL STRIP.AUTO: ABNORMAL
BUN SERPL-MCNC: 6 MG/DL (ref 7–20)
CALCIUM SERPL-MCNC: 8.6 MG/DL (ref 8.3–10.6)
CHLORIDE SERPL-SCNC: 103 MMOL/L (ref 99–110)
CLARITY UR: ABNORMAL
CO2 SERPL-SCNC: 18 MMOL/L (ref 21–32)
COARSE GRAN CASTS #/AREA URNS LPF: ABNORMAL /LPF (ref 0–2)
COLOR UR: ABNORMAL
CREAT SERPL-MCNC: 1 MG/DL (ref 0.8–1.3)
CRP SERPL-MCNC: <3 MG/L (ref 0–5.1)
DEPRECATED RDW RBC AUTO: 15.8 % (ref 12.4–15.4)
EOSINOPHIL # BLD: 0.1 K/UL (ref 0–0.6)
EOSINOPHIL NFR BLD: 2.6 %
EPI CELLS #/AREA URNS HPF: ABNORMAL /HPF (ref 0–5)
ERYTHROCYTE [SEDIMENTATION RATE] IN BLOOD BY WESTERGREN METHOD: 20 MM/HR (ref 0–20)
FLUAV RNA RESP QL NAA+PROBE: NOT DETECTED
FLUBV RNA RESP QL NAA+PROBE: NOT DETECTED
GFR SERPLBLD CREATININE-BSD FMLA CKD-EPI: 85 ML/MIN/{1.73_M2}
GLUCOSE SERPL-MCNC: 133 MG/DL (ref 70–99)
GLUCOSE UR STRIP.AUTO-MCNC: NEGATIVE MG/DL
HCT VFR BLD AUTO: 36.3 % (ref 40.5–52.5)
HGB BLD-MCNC: 11.9 G/DL (ref 13.5–17.5)
HGB UR QL STRIP.AUTO: NEGATIVE
HYALINE CASTS #/AREA URNS LPF: ABNORMAL /LPF (ref 0–2)
KETONES UR STRIP.AUTO-MCNC: NEGATIVE MG/DL
LEUKOCYTE ESTERASE UR QL STRIP.AUTO: NEGATIVE
LIPASE SERPL-CCNC: 40 U/L (ref 13–60)
LYMPHOCYTES # BLD: 1.4 K/UL (ref 1–5.1)
LYMPHOCYTES NFR BLD: 31.5 %
MACROCYTES BLD QL SMEAR: ABNORMAL
MCH RBC QN AUTO: 33.2 PG (ref 26–34)
MCHC RBC AUTO-ENTMCNC: 32.9 G/DL (ref 31–36)
MCV RBC AUTO: 101 FL (ref 80–100)
MONOCYTES # BLD: 0.5 K/UL (ref 0–1.3)
MONOCYTES NFR BLD: 11 %
NEUTROPHILS # BLD: 2.4 K/UL (ref 1.7–7.7)
NEUTROPHILS NFR BLD: 54.4 %
NITRITE UR QL STRIP.AUTO: NEGATIVE
PH UR STRIP.AUTO: 5.5 [PH] (ref 5–8)
PLATELET # BLD AUTO: 57 K/UL (ref 135–450)
PLATELET BLD QL SMEAR: ABNORMAL
PMV BLD AUTO: 8.6 FL (ref 5–10.5)
POTASSIUM SERPL-SCNC: 3.9 MMOL/L (ref 3.5–5.1)
PROCALCITONIN SERPL IA-MCNC: 0.15 NG/ML (ref 0–0.15)
PROT SERPL-MCNC: 7 G/DL (ref 6.4–8.2)
PROT UR STRIP.AUTO-MCNC: NEGATIVE MG/DL
RBC # BLD AUTO: 3.59 M/UL (ref 4.2–5.9)
RBC #/AREA URNS HPF: ABNORMAL /HPF (ref 0–4)
SARS-COV-2 RNA RESP QL NAA+PROBE: NOT DETECTED
SLIDE REVIEW: ABNORMAL
SODIUM SERPL-SCNC: 136 MMOL/L (ref 136–145)
SP GR UR STRIP.AUTO: 1.02 (ref 1–1.03)
TROPONIN, HIGH SENSITIVITY: <6 NG/L (ref 0–22)
UA COMPLETE W REFLEX CULTURE PNL UR: ABNORMAL
UA DIPSTICK W REFLEX MICRO PNL UR: YES
URN SPEC COLLECT METH UR: ABNORMAL
UROBILINOGEN UR STRIP-ACNC: 1 E.U./DL
WBC # BLD AUTO: 4.4 K/UL (ref 4–11)
WBC #/AREA URNS HPF: ABNORMAL /HPF (ref 0–5)

## 2024-10-30 PROCEDURE — 6370000000 HC RX 637 (ALT 250 FOR IP): Performed by: INTERNAL MEDICINE

## 2024-10-30 PROCEDURE — 74177 CT ABD & PELVIS W/CONTRAST: CPT

## 2024-10-30 PROCEDURE — 83690 ASSAY OF LIPASE: CPT

## 2024-10-30 PROCEDURE — 87040 BLOOD CULTURE FOR BACTERIA: CPT

## 2024-10-30 PROCEDURE — 84145 PROCALCITONIN (PCT): CPT

## 2024-10-30 PROCEDURE — 85652 RBC SED RATE AUTOMATED: CPT

## 2024-10-30 PROCEDURE — 76705 ECHO EXAM OF ABDOMEN: CPT

## 2024-10-30 PROCEDURE — 99285 EMERGENCY DEPT VISIT HI MDM: CPT

## 2024-10-30 PROCEDURE — 86140 C-REACTIVE PROTEIN: CPT

## 2024-10-30 PROCEDURE — 6360000002 HC RX W HCPCS: Performed by: INTERNAL MEDICINE

## 2024-10-30 PROCEDURE — 93005 ELECTROCARDIOGRAM TRACING: CPT | Performed by: INTERNAL MEDICINE

## 2024-10-30 PROCEDURE — 81001 URINALYSIS AUTO W/SCOPE: CPT

## 2024-10-30 PROCEDURE — 6360000004 HC RX CONTRAST MEDICATION: Performed by: PHYSICIAN ASSISTANT

## 2024-10-30 PROCEDURE — 1200000000 HC SEMI PRIVATE

## 2024-10-30 PROCEDURE — 84484 ASSAY OF TROPONIN QUANT: CPT

## 2024-10-30 PROCEDURE — 80053 COMPREHEN METABOLIC PANEL: CPT

## 2024-10-30 PROCEDURE — 85025 COMPLETE CBC W/AUTO DIFF WBC: CPT

## 2024-10-30 PROCEDURE — 82140 ASSAY OF AMMONIA: CPT

## 2024-10-30 PROCEDURE — 87636 SARSCOV2 & INF A&B AMP PRB: CPT

## 2024-10-30 RX ORDER — NICOTINE 21 MG/24HR
1 PATCH, TRANSDERMAL 24 HOURS TRANSDERMAL DAILY
Status: DISCONTINUED | OUTPATIENT
Start: 2024-10-30 | End: 2024-11-02 | Stop reason: HOSPADM

## 2024-10-30 RX ORDER — LEVETIRACETAM 500 MG/1
1250 TABLET ORAL 2 TIMES DAILY
Status: DISCONTINUED | OUTPATIENT
Start: 2024-10-30 | End: 2024-11-02 | Stop reason: HOSPADM

## 2024-10-30 RX ORDER — PANTOPRAZOLE SODIUM 40 MG/10ML
40 INJECTION, POWDER, LYOPHILIZED, FOR SOLUTION INTRAVENOUS 2 TIMES DAILY
Status: DISCONTINUED | OUTPATIENT
Start: 2024-10-30 | End: 2024-10-31

## 2024-10-30 RX ORDER — IOPAMIDOL 755 MG/ML
75 INJECTION, SOLUTION INTRAVASCULAR
Status: COMPLETED | OUTPATIENT
Start: 2024-10-30 | End: 2024-10-30

## 2024-10-30 RX ADMIN — PANTOPRAZOLE SODIUM 40 MG: 40 INJECTION, POWDER, FOR SOLUTION INTRAVENOUS at 20:25

## 2024-10-30 RX ADMIN — LEVETIRACETAM 1250 MG: 500 TABLET, FILM COATED ORAL at 22:56

## 2024-10-30 RX ADMIN — IOPAMIDOL 75 ML: 755 INJECTION, SOLUTION INTRAVENOUS at 17:57

## 2024-10-30 ASSESSMENT — ENCOUNTER SYMPTOMS
BACK PAIN: 0
SORE THROAT: 0
ABDOMINAL PAIN: 1
EYE PAIN: 0
RHINORRHEA: 0
COUGH: 0
SHORTNESS OF BREATH: 0
CONSTIPATION: 0
VOMITING: 0
DIARRHEA: 0
NAUSEA: 0

## 2024-10-30 ASSESSMENT — LIFESTYLE VARIABLES
HOW OFTEN DO YOU HAVE A DRINK CONTAINING ALCOHOL: NEVER
HOW MANY STANDARD DRINKS CONTAINING ALCOHOL DO YOU HAVE ON A TYPICAL DAY: PATIENT DOES NOT DRINK

## 2024-10-30 NOTE — ED PROVIDER NOTES
[facility]  EMERGENCY DEPARTMENT ENCOUNTER        Pt Name: Gio Beasley  MRN: [id number]  Birthdate 1965  Date of evaluation: 10/30/2024  Provider: KATT Lea  PCP: Kathryn Son APRN - NP  Note Started: 4:10 PM EDT 10/30/24       I have seen and evaluated this patient with my supervising physician Juanito Cardenas MD.      CHIEF COMPLAINT       Chief Complaint   Patient presents with    OTHER     Pt sister states that he hasn't been eating much for about a month. Pt sister states patient will take one bite and be done and has been sleeping a lot.        HISTORY OF PRESENT ILLNESS: 1 or more Elements     History from : Family patient's sister at bedside    Limitations to history : None    Gio Beasley is a 59 y.o. male who presents to the emergency room due to decrease in appetite and epigastric abdominal pain.  Patient states that he has a decrease in appetite and will only eat a few bites of food throughout the day and has noticed some epigastric pain at times.  He denies any fevers or chills nausea or vomiting.  He states the symptoms been going on for the past month.  He does drink PediaSure but as far as solid foods he does not eat much.  States that he has had normal bowel movements.  He denies any abdominal surgeries.    Nursing Notes were all reviewed and agreed with or any disagreements were addressed in the HPI.    REVIEW OF SYSTEMS :      Review of Systems   Constitutional:  Positive for appetite change. Negative for chills, diaphoresis and fever.   HENT:  Negative for congestion, rhinorrhea and sore throat.    Eyes:  Negative for pain and visual disturbance.   Respiratory:  Negative for cough and shortness of breath.    Cardiovascular:  Negative for chest pain and leg swelling.   Gastrointestinal:  Positive for abdominal pain. Negative for constipation, diarrhea, nausea and vomiting.   Genitourinary:  Negative for difficulty urinating, dysuria and frequency.

## 2024-10-30 NOTE — ED PROVIDER NOTES
In addition to the advanced practice provider, I personally saw Gio Beasley and performed a substantive portion of the visit including all aspects of the medical decision making.    Medical Decision Making  ***  Patient presented department with poor appetite and increasing epigastric abdominal pain.  This has been slowly progressive for about a month and he has transition to a mostly liquid diet.  He has had diffuse abdominal pain associated with this.  He has not had fever, confusion, nausea, vomiting, or other symptoms.    On exam, ***.    Patient's initial symptoms were vague and his exam was nonspecific.  However, workup revealed multiple concerning findings.  Specifically, patient has elevated liver enzymes and bilirubin, consistent with hepatitis, although they were only mildly elevated.  CT of his abdomen revealed hepatic cirrhosis and ascites with associated portal hypertension.  He also has findings that may suggest cholecystitis and/or enteritis.  Although patient is a chronic alcoholic, he has never had any of these diagnoses before.  He will require admission to the hospital for further evaluation of his cirrhosis, evaluation and treatment of his ascites and portal hypertension, and antibiotics for presumed infectious SBP and/or cholecystitis.  I have sent blood cultures from the emergency department and initiated treatment with Zosyn 4.5 g IV.    I consulted the hospitalist team through [facility] for admission. Dr. Vides reviewed the patient's history, physical exam, labs, imaging studies, and emergency department course and has decided to admit Gio Beasley for further evaluation and treatment.    I personally saw the patient and independently provided 15 minutes of non-concurrent critical care out of the total shared critical care time provided.    EKG  The Ekg interpreted by me in the absence of a cardiologist shows.  {Exam; heart rhythm:14844} with a rate of ***  Axis is

## 2024-10-31 ENCOUNTER — APPOINTMENT (OUTPATIENT)
Dept: MRI IMAGING | Age: 59
DRG: 432 | End: 2024-10-31
Payer: MEDICARE

## 2024-10-31 LAB
ALBUMIN SERPL-MCNC: 2.9 G/DL (ref 3.4–5)
ALBUMIN/GLOB SERPL: 0.8 {RATIO} (ref 1.1–2.2)
ALP SERPL-CCNC: 117 U/L (ref 40–129)
ALT SERPL-CCNC: 109 U/L (ref 10–40)
ANION GAP SERPL CALCULATED.3IONS-SCNC: 11 MMOL/L (ref 3–16)
AST SERPL-CCNC: 196 U/L (ref 15–37)
BASOPHILS # BLD: 0 K/UL (ref 0–0.2)
BASOPHILS NFR BLD: 0.3 %
BILIRUB SERPL-MCNC: 1.1 MG/DL (ref 0–1)
BUN SERPL-MCNC: 3 MG/DL (ref 7–20)
CALCIUM SERPL-MCNC: 8.3 MG/DL (ref 8.3–10.6)
CHLORIDE SERPL-SCNC: 103 MMOL/L (ref 99–110)
CO2 SERPL-SCNC: 21 MMOL/L (ref 21–32)
CREAT SERPL-MCNC: 1 MG/DL (ref 0.8–1.3)
DEPRECATED RDW RBC AUTO: 15.6 % (ref 12.4–15.4)
EOSINOPHIL # BLD: 0.1 K/UL (ref 0–0.6)
EOSINOPHIL NFR BLD: 3.4 %
FERRITIN SERPL IA-MCNC: 1323 NG/ML (ref 30–400)
FOLATE SERPL-MCNC: 7.73 NG/ML (ref 4.78–24.2)
GFR SERPLBLD CREATININE-BSD FMLA CKD-EPI: 85 ML/MIN/{1.73_M2}
GLUCOSE SERPL-MCNC: 106 MG/DL (ref 70–99)
HAV IGM SERPL QL IA: NORMAL
HBV CORE IGM SERPL QL IA: NORMAL
HBV SURFACE AG SERPL QL IA: NORMAL
HCT VFR BLD AUTO: 33.4 % (ref 40.5–52.5)
HCV AB SERPL QL IA: NORMAL
HGB BLD-MCNC: 10.9 G/DL (ref 13.5–17.5)
LYMPHOCYTES # BLD: 1.7 K/UL (ref 1–5.1)
LYMPHOCYTES NFR BLD: 42 %
MAGNESIUM SERPL-MCNC: 1.24 MG/DL (ref 1.8–2.4)
MCH RBC QN AUTO: 33 PG (ref 26–34)
MCHC RBC AUTO-ENTMCNC: 32.8 G/DL (ref 31–36)
MCV RBC AUTO: 100.6 FL (ref 80–100)
MONOCYTES # BLD: 0.5 K/UL (ref 0–1.3)
MONOCYTES NFR BLD: 11.1 %
NEUTROPHILS # BLD: 1.8 K/UL (ref 1.7–7.7)
NEUTROPHILS NFR BLD: 43.2 %
PHOSPHATE SERPL-MCNC: 2.5 MG/DL (ref 2.5–4.9)
PLATELET # BLD AUTO: 49 K/UL (ref 135–450)
PMV BLD AUTO: 7.8 FL (ref 5–10.5)
POTASSIUM SERPL-SCNC: 3.3 MMOL/L (ref 3.5–5.1)
PROT SERPL-MCNC: 6.5 G/DL (ref 6.4–8.2)
RBC # BLD AUTO: 3.32 M/UL (ref 4.2–5.9)
SODIUM SERPL-SCNC: 135 MMOL/L (ref 136–145)
VIT B12 SERPL-MCNC: 3454 PG/ML (ref 211–911)
WBC # BLD AUTO: 4.1 K/UL (ref 4–11)

## 2024-10-31 PROCEDURE — 82746 ASSAY OF FOLIC ACID SERUM: CPT

## 2024-10-31 PROCEDURE — 6370000000 HC RX 637 (ALT 250 FOR IP): Performed by: INTERNAL MEDICINE

## 2024-10-31 PROCEDURE — 1200000000 HC SEMI PRIVATE

## 2024-10-31 PROCEDURE — 82607 VITAMIN B-12: CPT

## 2024-10-31 PROCEDURE — 6360000002 HC RX W HCPCS: Performed by: INTERNAL MEDICINE

## 2024-10-31 PROCEDURE — 85025 COMPLETE CBC W/AUTO DIFF WBC: CPT

## 2024-10-31 PROCEDURE — 87390 HIV-1 AG IA: CPT

## 2024-10-31 PROCEDURE — 84100 ASSAY OF PHOSPHORUS: CPT

## 2024-10-31 PROCEDURE — 97530 THERAPEUTIC ACTIVITIES: CPT

## 2024-10-31 PROCEDURE — 83735 ASSAY OF MAGNESIUM: CPT

## 2024-10-31 PROCEDURE — 80074 ACUTE HEPATITIS PANEL: CPT

## 2024-10-31 PROCEDURE — 74183 MRI ABD W/O CNTR FLWD CNTR: CPT

## 2024-10-31 PROCEDURE — 82105 ALPHA-FETOPROTEIN SERUM: CPT

## 2024-10-31 PROCEDURE — 82728 ASSAY OF FERRITIN: CPT

## 2024-10-31 PROCEDURE — 36415 COLL VENOUS BLD VENIPUNCTURE: CPT

## 2024-10-31 PROCEDURE — 97116 GAIT TRAINING THERAPY: CPT

## 2024-10-31 PROCEDURE — 86702 HIV-2 ANTIBODY: CPT

## 2024-10-31 PROCEDURE — A9577 INJ MULTIHANCE: HCPCS | Performed by: STUDENT IN AN ORGANIZED HEALTH CARE EDUCATION/TRAINING PROGRAM

## 2024-10-31 PROCEDURE — 97161 PT EVAL LOW COMPLEX 20 MIN: CPT

## 2024-10-31 PROCEDURE — 97165 OT EVAL LOW COMPLEX 30 MIN: CPT

## 2024-10-31 PROCEDURE — 6370000000 HC RX 637 (ALT 250 FOR IP): Performed by: STUDENT IN AN ORGANIZED HEALTH CARE EDUCATION/TRAINING PROGRAM

## 2024-10-31 PROCEDURE — 6360000002 HC RX W HCPCS: Performed by: STUDENT IN AN ORGANIZED HEALTH CARE EDUCATION/TRAINING PROGRAM

## 2024-10-31 PROCEDURE — 97535 SELF CARE MNGMENT TRAINING: CPT

## 2024-10-31 PROCEDURE — 80053 COMPREHEN METABOLIC PANEL: CPT

## 2024-10-31 PROCEDURE — 86701 HIV-1ANTIBODY: CPT

## 2024-10-31 PROCEDURE — 2580000003 HC RX 258: Performed by: INTERNAL MEDICINE

## 2024-10-31 PROCEDURE — 6360000004 HC RX CONTRAST MEDICATION: Performed by: STUDENT IN AN ORGANIZED HEALTH CARE EDUCATION/TRAINING PROGRAM

## 2024-10-31 RX ORDER — ASPIRIN 81 MG/1
81 TABLET, CHEWABLE ORAL DAILY
Status: DISCONTINUED | OUTPATIENT
Start: 2024-10-31 | End: 2024-10-31

## 2024-10-31 RX ORDER — ACETAMINOPHEN 325 MG/1
650 TABLET ORAL EVERY 6 HOURS PRN
Status: DISCONTINUED | OUTPATIENT
Start: 2024-10-31 | End: 2024-11-02 | Stop reason: HOSPADM

## 2024-10-31 RX ORDER — SODIUM CHLORIDE 0.9 % (FLUSH) 0.9 %
10 SYRINGE (ML) INJECTION PRN
Status: DISCONTINUED | OUTPATIENT
Start: 2024-10-31 | End: 2024-11-02 | Stop reason: HOSPADM

## 2024-10-31 RX ORDER — LANOLIN ALCOHOL/MO/W.PET/CERES
3 CREAM (GRAM) TOPICAL NIGHTLY PRN
Status: DISCONTINUED | OUTPATIENT
Start: 2024-10-31 | End: 2024-11-02 | Stop reason: HOSPADM

## 2024-10-31 RX ORDER — SODIUM CHLORIDE 9 MG/ML
INJECTION, SOLUTION INTRAVENOUS PRN
Status: DISCONTINUED | OUTPATIENT
Start: 2024-10-31 | End: 2024-11-02 | Stop reason: HOSPADM

## 2024-10-31 RX ORDER — POTASSIUM CHLORIDE 1500 MG/1
40 TABLET, EXTENDED RELEASE ORAL PRN
Status: DISCONTINUED | OUTPATIENT
Start: 2024-10-31 | End: 2024-11-02 | Stop reason: HOSPADM

## 2024-10-31 RX ORDER — POTASSIUM CHLORIDE 7.45 MG/ML
10 INJECTION INTRAVENOUS PRN
Status: DISCONTINUED | OUTPATIENT
Start: 2024-10-31 | End: 2024-11-02 | Stop reason: HOSPADM

## 2024-10-31 RX ORDER — RISPERIDONE 1 MG/1
1 TABLET ORAL NIGHTLY
Status: DISCONTINUED | OUTPATIENT
Start: 2024-10-31 | End: 2024-10-31

## 2024-10-31 RX ORDER — PANTOPRAZOLE SODIUM 40 MG/1
40 TABLET, DELAYED RELEASE ORAL
Status: DISCONTINUED | OUTPATIENT
Start: 2024-10-31 | End: 2024-10-31

## 2024-10-31 RX ORDER — PANTOPRAZOLE SODIUM 40 MG/1
40 TABLET, DELAYED RELEASE ORAL
Status: DISCONTINUED | OUTPATIENT
Start: 2024-11-01 | End: 2024-11-02 | Stop reason: HOSPADM

## 2024-10-31 RX ORDER — SODIUM CHLORIDE 0.9 % (FLUSH) 0.9 %
10 SYRINGE (ML) INJECTION EVERY 12 HOURS SCHEDULED
Status: DISCONTINUED | OUTPATIENT
Start: 2024-10-31 | End: 2024-11-01 | Stop reason: SDUPTHER

## 2024-10-31 RX ORDER — ONDANSETRON 2 MG/ML
4 INJECTION INTRAMUSCULAR; INTRAVENOUS EVERY 6 HOURS PRN
Status: DISCONTINUED | OUTPATIENT
Start: 2024-10-31 | End: 2024-11-02 | Stop reason: HOSPADM

## 2024-10-31 RX ORDER — ACETAMINOPHEN 650 MG/1
650 SUPPOSITORY RECTAL EVERY 6 HOURS PRN
Status: DISCONTINUED | OUTPATIENT
Start: 2024-10-31 | End: 2024-11-02 | Stop reason: HOSPADM

## 2024-10-31 RX ORDER — MAGNESIUM SULFATE IN WATER 40 MG/ML
2000 INJECTION, SOLUTION INTRAVENOUS PRN
Status: DISCONTINUED | OUTPATIENT
Start: 2024-10-31 | End: 2024-11-02 | Stop reason: HOSPADM

## 2024-10-31 RX ORDER — PROMETHAZINE HYDROCHLORIDE 25 MG/1
12.5 TABLET ORAL EVERY 6 HOURS PRN
Status: DISCONTINUED | OUTPATIENT
Start: 2024-10-31 | End: 2024-11-02 | Stop reason: HOSPADM

## 2024-10-31 RX ORDER — ATORVASTATIN CALCIUM 40 MG/1
40 TABLET, FILM COATED ORAL DAILY
Status: DISCONTINUED | OUTPATIENT
Start: 2024-10-31 | End: 2024-10-31

## 2024-10-31 RX ADMIN — POTASSIUM CHLORIDE 40 MEQ: 1500 TABLET, EXTENDED RELEASE ORAL at 09:08

## 2024-10-31 RX ADMIN — Medication 10 ML: at 09:12

## 2024-10-31 RX ADMIN — POTASSIUM BICARBONATE 40 MEQ: 782 TABLET, EFFERVESCENT ORAL at 18:41

## 2024-10-31 RX ADMIN — Medication 10 ML: at 20:42

## 2024-10-31 RX ADMIN — LEVETIRACETAM 1250 MG: 500 TABLET, FILM COATED ORAL at 20:44

## 2024-10-31 RX ADMIN — LEVETIRACETAM 1250 MG: 500 TABLET, FILM COATED ORAL at 09:09

## 2024-10-31 RX ADMIN — GADOBENATE DIMEGLUMINE 9 ML: 529 INJECTION, SOLUTION INTRAVENOUS at 19:49

## 2024-10-31 RX ADMIN — MAGNESIUM SULFATE HEPTAHYDRATE 2000 MG: 40 INJECTION, SOLUTION INTRAVENOUS at 09:12

## 2024-10-31 RX ADMIN — PANTOPRAZOLE SODIUM 40 MG: 40 INJECTION, POWDER, FOR SOLUTION INTRAVENOUS at 09:09

## 2024-10-31 NOTE — PROGRESS NOTES
Patient admitted to . Room 5572. Patient with Sister. Patient oriented to floor and room . Snack  and warm [place] offered. VSS. Patient resting in bed in semi fowlers position. Respirations even and unlabored on room air. Patient oriented on how to call nurse.Call light within reach. Fall precautions in place. Bed in low, locked position. No additional needs noted at this time.

## 2024-10-31 NOTE — PROGRESS NOTES
down to basement   Home Access:  1-2 step to enter without rails   Bathroom Layout: tub/shower unit  Bathroom Equipment:  none  Toilet Height: standard height  Home Equipment:  none, his mother has equipment that she uses prn  Transfer Assistance: Independent without use of device  Ambulation Assistance:Independent without use of device  ADL Assistance: independent with all ADL's  IADL Assistance: independent with homemaking tasks, shares with sister  Active :        [] Yes  [x] No  Hand Dominance: [] Left  [x] Right  Current Employment: unemployed  Hobbies: walking  Recent Falls: denies falls in last 6 months    Available Assistance at Discharge: available PRN, sister works, pt reports he and his sister help with their mother- mostly IADLs    Examination   Vision:   Vision Gross Assessment: Impaired and Vision Corrective Device: wears glasses at all times  Hearing:   hard of hearing  Posture:   fair  Sensation:   reports numbness and tingling in (R) UE, (L) UE    Coordination Testing:   Coordination and Movement Description: (R) UE, (L) UE, fine motor impairments    ROM:   (B) UE AROM WFL  B hand atrophy noted, with functional activity pt mainly uses key pinch grasp with B UE d/t weakness/ N/T  Strength:   (B) UE strength grossly WFL    Therapist Clinical Decision Making (Complexity): low complexity  Clinical Presentation: stable      Subjective  General: patient reclined in bed on arrival, agreeable to therapy evaluation  Pain: 8/10.  Location: back  Pain Interventions: RN notified        Activities of Daily Living  Basic Activities of Daily Living  Grooming: modified independent oral care and washes face in stance at sink, increased time d/t FMC deficits, requires A to open tooth brush/paste  Lower Extremity Dressing: modified independent Increased time to complete task doff socks and don shoes seated EOB  Toileting: Independent voids in stance at toilet.    Instrumental Activities of Daily Living  No IADL  completed on this date.    Functional Mobility  Bed Mobility:  Supine to Sit: Independent  Scooting: Independent  Comments: HOB flat  Transfers:  Sit to stand transfer:Independent  Stand to sit transfer: Independent  Comments: STS from EOB to no device  Functional Mobility  Functional Mobility Activity: 800ft  Device Use: no device  Required Assistance: Independent  Comment: steady gait, no LOB  Balance:  Static Sitting Balance: good: independent with functional balance in unsupported position  Dynamic Sitting Balance: good: independent with functional balance in unsupported position  Static Standing Balance: good: independent with functional balance in unsupported position  Dynamic Standing Balance: good: independent with functional balance in unsupported position  Comments:    Other Therapeutic Interventions    Functional Outcomes  AM-PAC Inpatient Daily Activity Raw Score: 24                                    Cognition  Overall Cognitive Status: WFL  Safety Judgement: decreased awareness of need for safety  Problem Solving: decreased awareness of errors  Insights: decreased awareness of deficits  Orientation:    oriented to person, oriented to place, oriented to situation, and disoriented to time   Command Following:   WFL     Education  Barriers To Learning: visual  Patient Education: patient educated on OT role and benefits, plan of care, discharge recommendations  Learning Assessment:  patient verbalizes and demonstrates understanding    Assessment  Activity Tolerance: good  Impairments Requiring Therapeutic Intervention: none - eval with same day discharge  Prognosis: good  Clinical Assessment: pt presents at baseline, no further OT needs at this time  Safety Interventions: call light within reach, gait belt, seizure mats in use, and pt left seated EOB, RN present    Plan  Frequency: Eval with same day discharge.  No follow up required.  Current Treatment Recommendations: not applicable, evaluation completed

## 2024-10-31 NOTE — PROGRESS NOTES
Date of Admission: 10/30/2024. Hospital Day: 2       HOSPITAL COURSE  59 M admitted with fatigue.weakness and decompensated cirrhosis    Interval  History:   Reports abd upset with food, also doesn't feel taste of food much.   Mag 1.2, K 3.3.  hb 10.9, plt 49  , albumin 2.9      Physical Exam     BP (!) 96/59   Pulse 89   Temp 98.6 °F (37 °C) (Oral)   Resp 16   Ht 1.803 m (5' 11\")   Wt 46.6 kg (102 lb 12.8 oz)   SpO2 96%   BMI 14.34 kg/m²     General appearance: No apparent distress, appears stated age and cooperative.  Respiratory:  Normal respiratory effort. Clear to auscultation, bilaterally without Rales/Wheezes/Rhonchi.  Cardiovascular: Regular rate and rhythm with normal S1/S2 without murmurs, rubs or gallops.          Assessment/Plan:      Decompensated cirrhosis/ Acute hepatitis/  Liver mass  Cirrhosis with small ascites, thrombocytoepenia  Has new liver lesion suggestive of fatty liver vs   mass  GI consulted, appreciated  Hepatitis HIV, pending  Suspected alcohol related although pt reports drinking a beer every other day   AMA ordered previously negative     Fluid around the gallbladder: Abnormal CT of the gallbladder,  Right upper quadrant ultrasound show  GB wall thickening with mininal pericholecystic fluid suggestive of  early acaclculous cholecystitis?  Consult gen surg       COPD: Not in acute exacerbation.  Continued home inhalers     Severe malnutrition:  Nutrition consulted, appreciated     #Anemia ,Macrocytic  #Thrombocytopenia:   B12 folate, iron studies pending  Suspect 2/2  liver disease    Hypomagnesemia  Mag 1.2 , repleted iv and monitor     Epilepsy: Continue Keppra  Cigarette smoking: Negative patch ordered        Active Hospital Problems    Diagnosis     Decompensation of cirrhosis of liver (HCC) [K72.90, K74.60]            DVT Prophylaxis:    Diet: Diet NPO Exceptions are: Ice Chips, Sips of Water with Meds  Code Status: Full Code      Dispo - home .  Expected DC  in/on

## 2024-10-31 NOTE — PROGRESS NOTES
[facility] - Inpatient Rehabilitation Department   Phone: [phone]    Physical Therapy    [x] Initial Evaluation            [] Daily Treatment Note         [x] Discharge Summary      Patient: Gio Beasley   : 1965   MRN: [id number]   Date of Service:  10/31/2024  Admitting Diagnosis: Decompensation of cirrhosis of liver (HCC)  Current Admission Summary: Per H&P  on 10/30 \"59 y.o. male who presented to [facility] with past medical history of alcohol abuse, is now only taking every other day, seizurePresented to the ED with chief complaint of fatigue and weakness     Patient has always been a small 5 for all his life per patient and his sister at bedside reported that he never knew that he had cirrhosis or that he had emphysema.  Patient reports that he has progressively been having more decline with decreased appetite and weak and has been sleeping a lot more than normal.  Patient did see his primary care doctor and was told they need to go back to boost for nutritional importance.  Patient denies having any fever chills nausea vomiting chest pain shortness of breath abdominal pain or diarrhea.  Patient denied having any rectal bleeding.  And also denies being established with GI.\"    Past Medical History:  has a past medical history of Alcohol abuse, Cancer (HCC), and Seizures (HCC).  Past Surgical History:  has a past surgical history that includes Upper gastrointestinal endoscopy (N/A, 2021).    Discharge Recommendations: Gio Beasley scored a  on the AM-PAC short mobility form.  At this time, no further PT is recommended upon discharge due to pt functioning at hise baseline and has no further acute PT needs.  Recommend patient returns to prior setting with prior services.      DME Required For Discharge: patient has all required DME for discharge  Precautions/Restrictions: low fall risk, NPO (ice chips and sips with meds)  Weight Bearing Restrictions: no

## 2024-10-31 NOTE — PROGRESS NOTES
Nutrition Note    RECOMMENDATIONS  PO Diet: NPO; Advance to Regular Diet when medically appropriate   ONS: Once diet advances, start Ensure plus High Protein TID    ASSESSMENT   Nutrition consult received for unintentional wt loss. Prior to admission, pt has not been eating well for a month per MD. Upon visit, pt reports wt loss has been going on for a month d/t stomach pain.  lb. A little over a year ago, pt was 98 lbs per EMR indicating weight gain. Pt is currently NPO per GI, but asking for food at bedside per RN. Pt does not drink Ensure at home, stating he drinks orange juice and milk. Wishes to receive Ensure Plus High Protein when diet advances for additional protein. Pt meets criteria for severe malnutrition (see malnutrition assessment). Will monitor PO intake and ONS acceptance once diet advances.       Malnutrition Status: Severe malnutrition  Chronic Illness  Findings of the 6 clinical characteristics of malnutrition:  Energy Intake:  75% or less estimated energy requirements for 1 month or longer  Weight Loss:  No weight loss     Body Fat Loss:  Mild body fat loss Orbital, Buccal region   Muscle Mass Loss:  Severe muscle mass loss Hand (interosseous), Clavicles (pectoralis & deltoids)  Fluid Accumulation:  No fluid accumulation     Strength:  Not Performed      NUTRITION DIAGNOSIS   Severe malnutrition, in context of chronic illness related to altered GI function as evidenced by criteria as identified in malnutrition assessment  Inadequate protein-energy intake related to altered GI function as evidenced by poor intake prior to admission    Goals: Initiate PO diet, PO intake 50% or greater, by next RD assessment     NUTRITION RELATED FINDINGS  Objective: K+ 3.3. Mg 1.24. LBM 10/30. Oriented/disoriented to situation and time. No edema noted.  Wounds: None    CURRENT NUTRITION THERAPIES  Diet NPO Exceptions are: Ice Chips, Sips of Water with Meds     PO Intake: NPO   PO Supplement Intake:None  Ordered    ANTHROPOMETRICS  Current Height: 180.3 cm (5' 10.98\")  Current Weight - Scale: 46.6 kg (102 lb 12.8 oz)    Ideal Body Weight (IBW): 172 lbs  (78 kg)        BMI: 14.3      COMPARATIVE STANDARDS  Total Energy Requirements (kcals/day): 7781-9311     Protein (g):  70-93       Fluid (mL/day):  7748-0447    EDUCATION  No recommendation at this time     The patient will be monitored per nutrition standards of care. Consult dietitian if additional nutrition interventions are needed prior to RD reassessment.     Dena Martin    Contact: [phone]

## 2024-10-31 NOTE — PROGRESS NOTES
4 Eyes Skin Assessment     NAME:  Gio Beasley  YOB: 1965  MEDICAL RECORD NUMBER:  [id number]    The patient is being assessed for  Admission    I agree that at least one RN has performed a thorough Head to Toe Skin Assessment on the patient. ALL assessment sites listed below have been assessed.      Areas assessed by both nurses:    Head, Face, Ears, Shoulders, Back, Chest, Arms, Elbows, Hands, Sacrum. Buttock, Coccyx, Ischium, Legs. Feet and Heels, and Under Medical Devices         Does the Patient have a Wound? No noted wound(s)       Lon Prevention initiated by RN: No  Wound Care Orders initiated by RN: No    Pressure Injury (Stage 3,4, Unstageable, DTI, NWPT, and Complex wounds) if present, place Wound referral order by RN under : No    New Ostomies, if present place, Ostomy referral order under : No     Nurse 1 eSignature: Electronically signed by Derek Espinal RN on 10/31/24 at 2:43 AM EDT    **SHARE this note so that the co-signing nurse can place an eSignature**    Nurse 2 eSignature: Electronically signed by Amber White LPN on 10/31/24 at 2:56 AM EDT

## 2024-10-31 NOTE — PROGRESS NOTES
0800- Morning labs reviewed. Abnormal labs noted. Henry notified via secure message, awaiting response.    0930- Response received. New orders noted. Morning assessments and vital signs completed. Oral potassium and IV magnesium given as prescribed. Patient is asking for food. Henry at bedside. Verbal order received for GI consult per . Patient informed, verbal understanding stated. PT/OT at bedside.    1230-  at bedside.    1330- GI at bedside. New orders noted. Patient is unable to complete MRI checklist at this time as he is oriented to person, place but forgetful to time and situation. Will attempt to call his sister for information.    1445- General surgery at bedside. Instructed that it is okay for patient to eat at this time. Henry notified via secure message. Verbal order noted for regular diet. Order placed, patient informed and assisted to order meal.    1500- MRI checklist complete with this writer and patient's sister/Candy. Checklist faxed to MRI.     1700- MRI called for patient to come down. Patient declines at this time stating he is willing to go after he eats dinner. MRI notified.    1800- Consent for EGD completed with this writer and sister who is at bedside.

## 2024-10-31 NOTE — CONSULTS
Gastroenterology Consult Note        Patient: Gio Beasley  : 1965  Acct#:      Date:  10/31/2024      1. Acute liver failure without hepatic coma    2. Transaminitis    3. Decreased appetite    4. Ascites due to alcoholic hepatitis    5. Portal hypertension (HCC)        Subjective:       History of Present Illness  Patient is a 59 y.o.  male admitted with Portal hypertension (HCC) [K76.6]  Decreased appetite [R63.0]  Transaminitis [R74.01]  Ascites due to alcoholic hepatitis [K70.11]  Decompensation of cirrhosis of liver (HCC) [K72.90, K74.60]  Acute liver failure without hepatic coma [K72.00] who is seen in consult for decompensated cirrhosis.    He was diagnosed with cirrhosis in  during a prior admission here.  At that time he had GI bleed due to peptic ulcer disease.  He never followed up with anybody for his cirrhosis.  Does not have a PCP.  Back then he did not report significant alcohol use.  Today he states he would drink 48 ounces of beer daily for years.  Cut back to 1 beer a week now.    He was brought to the ER by his sister for decreased p.o. intake for 1 month.  Denies any abdominal pain, nausea, vomiting.  No melena, hematochezia.  Has had 3 to 4 pound weight loss.  Takes ibuprofen as needed, maybe once a week.      Past Medical History:   Diagnosis Date    Alcohol abuse     Cancer (HCC)     Seizures (HCC)       Past Surgical History:   Procedure Laterality Date    UPPER GASTROINTESTINAL ENDOSCOPY N/A 2021    EGD GASTRIC BIOPSY R/O HPYLORI performed by Milagros Diop MD at [facility] ENDOSCOPY      Past Endoscopic History    EGD with Dr Diop 2021    IMPRESSION :   LA Class B erosive esophagitis  No varices  Diffuse changes throughout stomach suggestive of portal  hypertensive gastropathy. Biopsies obtained for H pylori  5 clean based ulcers. largest about 7mm  3 clean based ulcers. Largest about 1 cm  No hematin throughout  PLAN : PPI  slowly

## 2024-11-01 ENCOUNTER — ANESTHESIA (OUTPATIENT)
Dept: ENDOSCOPY | Age: 59
End: 2024-11-01
Payer: MEDICARE

## 2024-11-01 ENCOUNTER — ANESTHESIA EVENT (OUTPATIENT)
Dept: ENDOSCOPY | Age: 59
End: 2024-11-01
Payer: MEDICARE

## 2024-11-01 PROBLEM — K72.00 ACUTE LIVER FAILURE WITHOUT HEPATIC COMA: Status: ACTIVE | Noted: 2024-11-01

## 2024-11-01 PROBLEM — K76.6 PORTAL HYPERTENSION (HCC): Status: ACTIVE | Noted: 2024-11-01

## 2024-11-01 LAB
AFP-TM SERPL-MCNC: 15.1 UG/L
ALBUMIN SERPL-MCNC: 2.8 G/DL (ref 3.4–5)
ALBUMIN/GLOB SERPL: 0.8 {RATIO} (ref 1.1–2.2)
ALP SERPL-CCNC: 104 U/L (ref 40–129)
ALT SERPL-CCNC: 82 U/L (ref 10–40)
ANION GAP SERPL CALCULATED.3IONS-SCNC: 7 MMOL/L (ref 3–16)
AST SERPL-CCNC: 128 U/L (ref 15–37)
BACTERIA BLD CULT ORG #2: NORMAL
BACTERIA BLD CULT: NORMAL
BASOPHILS # BLD: 0 K/UL (ref 0–0.2)
BASOPHILS NFR BLD: 0.5 %
BILIRUB SERPL-MCNC: 0.9 MG/DL (ref 0–1)
BUN SERPL-MCNC: <2 MG/DL (ref 7–20)
CALCIUM SERPL-MCNC: 8.5 MG/DL (ref 8.3–10.6)
CHLORIDE SERPL-SCNC: 101 MMOL/L (ref 99–110)
CO2 SERPL-SCNC: 24 MMOL/L (ref 21–32)
CREAT SERPL-MCNC: 1 MG/DL (ref 0.8–1.3)
DEPRECATED RDW RBC AUTO: 15.6 % (ref 12.4–15.4)
EOSINOPHIL # BLD: 0.2 K/UL (ref 0–0.6)
EOSINOPHIL NFR BLD: 3.1 %
GFR SERPLBLD CREATININE-BSD FMLA CKD-EPI: 85 ML/MIN/{1.73_M2}
GLUCOSE SERPL-MCNC: 111 MG/DL (ref 70–99)
HCT VFR BLD AUTO: 31.8 % (ref 40.5–52.5)
HGB BLD-MCNC: 10.8 G/DL (ref 13.5–17.5)
HIV 1+2 AB+HIV1 P24 AG SERPL QL IA: NORMAL
HIV 2 AB SERPL QL IA: NORMAL
HIV1 AB SERPL QL IA: NORMAL
HIV1 P24 AG SERPL QL IA: NORMAL
INR PPP: 1.44 (ref 0.85–1.15)
IRON SATN MFR SERPL: 53 % (ref 20–50)
IRON SERPL-MCNC: 78 UG/DL (ref 59–158)
LACTATE BLDV-SCNC: 1.3 MMOL/L (ref 0.4–2)
LYMPHOCYTES # BLD: 1.7 K/UL (ref 1–5.1)
LYMPHOCYTES NFR BLD: 32.5 %
MCH RBC QN AUTO: 33.6 PG (ref 26–34)
MCHC RBC AUTO-ENTMCNC: 34.1 G/DL (ref 31–36)
MCV RBC AUTO: 98.7 FL (ref 80–100)
MONOCYTES # BLD: 0.5 K/UL (ref 0–1.3)
MONOCYTES NFR BLD: 9.4 %
NEUTROPHILS # BLD: 2.9 K/UL (ref 1.7–7.7)
NEUTROPHILS NFR BLD: 54.5 %
PHOSPHATE SERPL-MCNC: 1.8 MG/DL (ref 2.5–4.9)
PLATELET # BLD AUTO: 55 K/UL (ref 135–450)
PMV BLD AUTO: 8 FL (ref 5–10.5)
POTASSIUM SERPL-SCNC: 4.5 MMOL/L (ref 3.5–5.1)
PROT SERPL-MCNC: 6.2 G/DL (ref 6.4–8.2)
PROTHROMBIN TIME: 17.7 SEC (ref 11.9–14.9)
RBC # BLD AUTO: 3.23 M/UL (ref 4.2–5.9)
SODIUM SERPL-SCNC: 132 MMOL/L (ref 136–145)
TIBC SERPL-MCNC: 147 UG/DL (ref 260–445)
WBC # BLD AUTO: 5.3 K/UL (ref 4–11)

## 2024-11-01 PROCEDURE — 6360000002 HC RX W HCPCS: Performed by: NURSE ANESTHETIST, CERTIFIED REGISTERED

## 2024-11-01 PROCEDURE — 85025 COMPLETE CBC W/AUTO DIFF WBC: CPT

## 2024-11-01 PROCEDURE — 83605 ASSAY OF LACTIC ACID: CPT

## 2024-11-01 PROCEDURE — 2580000003 HC RX 258: Performed by: INTERNAL MEDICINE

## 2024-11-01 PROCEDURE — 6370000000 HC RX 637 (ALT 250 FOR IP): Performed by: PHYSICIAN ASSISTANT

## 2024-11-01 PROCEDURE — 94760 N-INVAS EAR/PLS OXIMETRY 1: CPT

## 2024-11-01 PROCEDURE — 1200000000 HC SEMI PRIVATE

## 2024-11-01 PROCEDURE — 2580000003 HC RX 258: Performed by: STUDENT IN AN ORGANIZED HEALTH CARE EDUCATION/TRAINING PROGRAM

## 2024-11-01 PROCEDURE — 84100 ASSAY OF PHOSPHORUS: CPT

## 2024-11-01 PROCEDURE — 0DB78ZX EXCISION OF STOMACH, PYLORUS, VIA NATURAL OR ARTIFICIAL OPENING ENDOSCOPIC, DIAGNOSTIC: ICD-10-PCS | Performed by: INTERNAL MEDICINE

## 2024-11-01 PROCEDURE — 83540 ASSAY OF IRON: CPT

## 2024-11-01 PROCEDURE — 7100000000 HC PACU RECOVERY - FIRST 15 MIN: Performed by: INTERNAL MEDICINE

## 2024-11-01 PROCEDURE — 80053 COMPREHEN METABOLIC PANEL: CPT

## 2024-11-01 PROCEDURE — 2500000003 HC RX 250 WO HCPCS: Performed by: STUDENT IN AN ORGANIZED HEALTH CARE EDUCATION/TRAINING PROGRAM

## 2024-11-01 PROCEDURE — 88305 TISSUE EXAM BY PATHOLOGIST: CPT

## 2024-11-01 PROCEDURE — 2709999900 HC NON-CHARGEABLE SUPPLY: Performed by: INTERNAL MEDICINE

## 2024-11-01 PROCEDURE — APPNB30 APP NON BILLABLE TIME 0-30 MINS: Performed by: NURSE PRACTITIONER

## 2024-11-01 PROCEDURE — 3700000000 HC ANESTHESIA ATTENDED CARE: Performed by: INTERNAL MEDICINE

## 2024-11-01 PROCEDURE — 3609012400 HC EGD TRANSORAL BIOPSY SINGLE/MULTIPLE: Performed by: INTERNAL MEDICINE

## 2024-11-01 PROCEDURE — 6370000000 HC RX 637 (ALT 250 FOR IP): Performed by: INTERNAL MEDICINE

## 2024-11-01 PROCEDURE — 3700000001 HC ADD 15 MINUTES (ANESTHESIA): Performed by: INTERNAL MEDICINE

## 2024-11-01 PROCEDURE — 83550 IRON BINDING TEST: CPT

## 2024-11-01 PROCEDURE — 99222 1ST HOSP IP/OBS MODERATE 55: CPT | Performed by: SURGERY

## 2024-11-01 PROCEDURE — 85610 PROTHROMBIN TIME: CPT

## 2024-11-01 PROCEDURE — 2500000003 HC RX 250 WO HCPCS: Performed by: NURSE ANESTHETIST, CERTIFIED REGISTERED

## 2024-11-01 PROCEDURE — 0DB98ZX EXCISION OF DUODENUM, VIA NATURAL OR ARTIFICIAL OPENING ENDOSCOPIC, DIAGNOSTIC: ICD-10-PCS | Performed by: INTERNAL MEDICINE

## 2024-11-01 PROCEDURE — 7100000001 HC PACU RECOVERY - ADDTL 15 MIN: Performed by: INTERNAL MEDICINE

## 2024-11-01 PROCEDURE — 36415 COLL VENOUS BLD VENIPUNCTURE: CPT

## 2024-11-01 RX ORDER — PROPOFOL 10 MG/ML
INJECTION, EMULSION INTRAVENOUS
Status: DISCONTINUED | OUTPATIENT
Start: 2024-11-01 | End: 2024-11-01 | Stop reason: SDUPTHER

## 2024-11-01 RX ORDER — LIDOCAINE HYDROCHLORIDE 20 MG/ML
INJECTION, SOLUTION EPIDURAL; INFILTRATION; INTRACAUDAL; PERINEURAL
Status: DISCONTINUED | OUTPATIENT
Start: 2024-11-01 | End: 2024-11-01 | Stop reason: SDUPTHER

## 2024-11-01 RX ORDER — PANTOPRAZOLE SODIUM 40 MG/1
40 TABLET, DELAYED RELEASE ORAL
Qty: 30 TABLET | Refills: 0 | Status: CANCELLED | OUTPATIENT
Start: 2024-11-01

## 2024-11-01 RX ORDER — LANOLIN ALCOHOL/MO/W.PET/CERES
100 CREAM (GRAM) TOPICAL DAILY
Qty: 30 TABLET | Refills: 0 | Status: CANCELLED | OUTPATIENT
Start: 2024-11-01

## 2024-11-01 RX ADMIN — PROPOFOL 50 MG: 10 INJECTION, EMULSION INTRAVENOUS at 12:57

## 2024-11-01 RX ADMIN — PANTOPRAZOLE SODIUM 40 MG: 40 TABLET, DELAYED RELEASE ORAL at 09:21

## 2024-11-01 RX ADMIN — PROPOFOL 50 MG: 10 INJECTION, EMULSION INTRAVENOUS at 12:47

## 2024-11-01 RX ADMIN — PROPOFOL 50 MG: 10 INJECTION, EMULSION INTRAVENOUS at 12:54

## 2024-11-01 RX ADMIN — LEVETIRACETAM 1250 MG: 500 TABLET, FILM COATED ORAL at 20:27

## 2024-11-01 RX ADMIN — SODIUM PHOSPHATE, MONOBASIC, MONOHYDRATE AND SODIUM PHOSPHATE, DIBASIC, ANHYDROUS 15 MMOL: 142; 276 INJECTION, SOLUTION INTRAVENOUS at 14:49

## 2024-11-01 RX ADMIN — SODIUM CHLORIDE, PRESERVATIVE FREE 10 ML: 5 INJECTION INTRAVENOUS at 20:26

## 2024-11-01 RX ADMIN — ACETAMINOPHEN 650 MG: 325 TABLET ORAL at 20:27

## 2024-11-01 RX ADMIN — PROPOFOL 50 MG: 10 INJECTION, EMULSION INTRAVENOUS at 12:50

## 2024-11-01 RX ADMIN — Medication 10 ML: at 09:19

## 2024-11-01 RX ADMIN — LIDOCAINE HYDROCHLORIDE 100 MG: 20 INJECTION, SOLUTION EPIDURAL; INFILTRATION; INTRACAUDAL; PERINEURAL at 12:47

## 2024-11-01 RX ADMIN — LEVETIRACETAM 1250 MG: 500 TABLET, FILM COATED ORAL at 09:18

## 2024-11-01 ASSESSMENT — PAIN SCALES - WONG BAKER
WONGBAKER_NUMERICALRESPONSE: NO HURT

## 2024-11-01 ASSESSMENT — PAIN - FUNCTIONAL ASSESSMENT
PAIN_FUNCTIONAL_ASSESSMENT: 0-10
PAIN_FUNCTIONAL_ASSESSMENT: WONG-BAKER FACES
PAIN_FUNCTIONAL_ASSESSMENT: NONE - DENIES PAIN

## 2024-11-01 ASSESSMENT — LIFESTYLE VARIABLES: SMOKING_STATUS: 1

## 2024-11-01 NOTE — CONSULTS
[facility] GENERAL AND LAPAROSCOPIC SURGERY                       PATIENT NAME: Gio Beasley     ADMISSION DATE: 10/30/2024  3:39 PM      TODAY'S DATE: 11/1/2024    Reason for Consult:  Gallbladder    Requesting Physician:  Dr. MISSY Vides    HISTORY OF PRESENT ILLNESS:              The patient is a 59 y.o. male who presents with cirrhosis, decompensated, not eating well, losing weight. Has had prior PUD with bleed. Has alcohol abuse history. No abd pain complaints. No emesis with eating / no pain with eating.    Past Medical History:        Diagnosis Date    Alcohol abuse     Awareness under anesthesia     Cancer (HCC)     Seizures (HCC)        Past Surgical History:        Procedure Laterality Date    UPPER GASTROINTESTINAL ENDOSCOPY N/A 6/24/2021    EGD GASTRIC BIOPSY R/O HPYLORI performed by Milagros Diop MD at [facility] ENDOSCOPY    UPPER GASTROINTESTINAL ENDOSCOPY N/A 11/1/2024    ESOPHAGOGASTRODUODENOSCOPY BIOPSY performed by Arian Martin MD at [facility] ENDOSCOPY       Current Medications:   Current Facility-Administered Medications: sodium phosphate 7.26 mmol in sodium chloride 0.9 % 250 mL IVPB, 0.16 mmol/kg, IntraVENous, PRN **OR** sodium phosphate 14.52 mmol in sodium chloride 0.9 % 250 mL IVPB, 0.32 mmol/kg, IntraVENous, PRN  sodium phosphate 15 mmol in sodium chloride 0.9 % 250 mL IVPB, 15 mmol, IntraVENous, Once  sodium chloride flush 0.9 % injection 10 mL, 10 mL, IntraVENous, 2 times per day  sodium chloride flush 0.9 % injection 10 mL, 10 mL, IntraVENous, PRN  0.9 % sodium chloride infusion, , IntraVENous, PRN  promethazine (PHENERGAN) tablet 12.5 mg, 12.5 mg, Oral, Q6H PRN **OR** ondansetron (ZOFRAN) injection 4 mg, 4 mg, IntraVENous, Q6H PRN  melatonin tablet 3 mg, 3 mg, Oral, Nightly PRN  acetaminophen (TYLENOL) tablet 650 mg, 650 mg, Oral, Q6H PRN **OR** acetaminophen (TYLENOL) suppository 650 mg, 650 mg, Rectal, Q6H PRN  potassium chloride (KLOR-CON M) extended release tablet 40 mEq,

## 2024-11-01 NOTE — BRIEF OP NOTE
Brief Postoperative Note      Patient: Gio Beasley  YOB: 1965  MRN: [id number]    Date of Procedure: 11/1/2024    Pre-Op Diagnosis Codes:      * Cirrhosis of liver with ascites, unspecified hepatic cirrhosis type (HCC) [K74.60, R18.8]    Procedure(s):  ESOPHAGOGASTRODUODENOSCOPY BIOPSY    Surgeon(s):  Arian Martin MD      Anesthesia: Monitor Anesthesia Care    Estimated Blood Loss (mL): Minimal    Complications: None    Specimens:   ID Type Source Tests Collected by Time Destination   A : Duodenum Bx Tissue Duodenum SURGICAL PATHOLOGY Arian Martin MD 11/1/2024 1302    B : Gastric Bx R/O H. Pylori Tissue Stomach SURGICAL PATHOLOGY Arian Martin MD 11/1/2024 1302      Findings:         -  The examined esophagus was normal.         -  A few erosions with no bleeding and no stigmata of recent bleeding were            found in the gastric antrum.  Biopsies were taken with a cold forceps for            histology.         -  The examined duodenum was normal.  This was biopsied with a cold forceps            for histology.  Recommendation:         - PATHOLOGY RESULTS: will be available in the [employer] portal or [employer]            phone najma within 2 weeks. Please go to [email or web address]. There            are instructions there how to access your medical records online and how to            download the [employer] phone najma. You can also call the GI office at             to get your pathology results.         - Absolutely no NSAIDs         - Pantoprazole 40 mg daily         - Recall EGD in 2 years for esophageal variceal screening    TO SEE DETAILED ENDOSCOPY REPORT IN EPIC: CHART REVIEW TAB --> NOTES TAB--> \"UPPER GI ENDOSCOPY\" (scanned PDF with pictures)    Electronically signed by Arian Martin MD on 11/1/2024 at 1:16 PM

## 2024-11-01 NOTE — PROGRESS NOTES
Date of Admission: 10/30/2024. Hospital Day: 3       HOSPITAL COURSE  59 M admitted with fatigue.weakness and decompensated cirrhosis    Interval  History:   Reports abd upset with food, also doesn't feel taste of food much.   Mag 1.2, K 3.3.  hb 10.9, plt 49  , albumin 2.9      Physical Exam     /66   Pulse 78   Temp 98.1 °F (36.7 °C) (Oral)   Resp 18   Ht 1.803 m (5' 10.98\")   Wt 45.4 kg (100 lb)   SpO2 97%   BMI 13.95 kg/m²     General appearance: No apparent distress, appears stated age and cooperative.  Respiratory:  Normal respiratory effort. Clear to auscultation, bilaterally without Rales/Wheezes/Rhonchi.  Cardiovascular: Regular rate and rhythm with normal S1/S2 without murmurs, rubs or gallops.          Assessment/Plan:      Decompensated cirrhosis/ Acute hepatitis/  Liver mass  Cirrhosis with small ascites, thrombocytoepenia  Has new liver lesion suggestive of fatty liver vs   mass  GI consulted, appreciated  Hepatitis HIV, pending  Suspected alcohol related although pt reports drinking a beer every other day   AMA ordered previously negative     Fluid around the gallbladder: Abnormal CT of the gallbladder,  Right upper quadrant ultrasound show  GB wall thickening with mininal pericholecystic fluid suggestive of  early acaclculous cholecystitis?  Consult gen surg    Hypomagnesemia  Mag 1.2 , repleted iv and monitor    Hypophosphatemia    Epilepsy: Continue Keppra.reviewed neuro office visit from 9/2024       COPD: Not in acute exacerbation.  Continued home inhalers     Severe malnutrition:  Nutrition consulted, appreciated     #Anemia ,Macrocytic  #Thrombocytopenia:   B12 folate, iron studies pending  Suspect 2/2  liver disease           Cigarette smoking: Negative patch ordered        Active Hospital Problems    Diagnosis     Decompensation of cirrhosis of liver (HCC) [K72.90, K74.60]     Severe malnutrition (HCC) [E43]            DVT Prophylaxis:    Diet: Diet NPO Exceptions are: Sips  thickening of the bladder which could be due to nondistention   versus cystitis.   5. Emphysema.             IP CONSULT TO DIETITIAN  IP CONSULT TO GI  IP CONSULT TO GENERAL SURGERY      Javed Jain MD'

## 2024-11-01 NOTE — PROGRESS NOTES
Patient back from MRI. Shift assessment complete. VSS. Patient resting in bed. Respirations even and unlabored on room air. Call light within reach. Fall precautions in place. Bed in low, locked position. No additional needs noted at this time.

## 2024-11-01 NOTE — PLAN OF CARE
Problem: Confusion  Goal: Confusion, delirium, dementia, or psychosis is improved or at baseline  Description: INTERVENTIONS:  1. Assess for possible contributors to thought disturbance, including medications, impaired vision or hearing, underlying metabolic abnormalities, dehydration, psychiatric diagnoses, and notify attending LIP  2. [place] high risk fall precautions, as indicated  3. Provide frequent short contacts to provide reality reorientation, refocusing and direction  4. Decrease environmental stimuli, including noise as appropriate  5. Monitor and intervene to maintain adequate nutrition, hydration, elimination, sleep and activity  6. If unable to ensure safety without constant attention obtain sitter and review sitter guidelines with assigned personnel  7. Initiate Psychosocial CNS and Spiritual Care consult, as indicated  Outcome: Progressing     Problem: Safety - Adult  Goal: Free from fall injury  Outcome: Progressing     Problem: Nutrition Deficit:  Goal: Optimize nutritional status  Outcome: Progressing     Problem: Pain  Goal: Verbalizes/displays adequate comfort level or baseline comfort level  Outcome: Progressing

## 2024-11-01 NOTE — PROGRESS NOTES
MD notified by secure message regarding expiring telemetry monitoring orders. New order obtained to discontinue telemetry monitoring.

## 2024-11-01 NOTE — PLAN OF CARE
Problem: Confusion  Goal: Confusion, delirium, dementia, or psychosis is improved or at baseline  Description: INTERVENTIONS:  1. Assess for possible contributors to thought disturbance, including medications, impaired vision or hearing, underlying metabolic abnormalities, dehydration, psychiatric diagnoses, and notify attending LIP  2. [place] high risk fall precautions, as indicated  3. Provide frequent short contacts to provide reality reorientation, refocusing and direction  4. Decrease environmental stimuli, including noise as appropriate  5. Monitor and intervene to maintain adequate nutrition, hydration, elimination, sleep and activity  6. If unable to ensure safety without constant attention obtain sitter and review sitter guidelines with assigned personnel  7. Initiate Psychosocial CNS and Spiritual Care consult, as indicated  Outcome: Progressing     Problem: Safety - Adult  Goal: Free from fall injury  Outcome: Progressing     Problem: Nutrition Deficit:  Goal: Optimize nutritional status  Outcome: Progressing

## 2024-11-01 NOTE — ANESTHESIA PRE PROCEDURE
Department of Anesthesiology  Preprocedure Note       Name:  Gio Beasley   Age:  59 y.o.  :  1965                                          MRN:  [id number]         Date:  2024      Surgeon: Surgeon(s):  Arian Martin MD    Procedure: Procedure(s):  ESOPHAGOGASTRODUODENOSCOPY DIAGNOSTIC ONLY    Medications prior to admission:   Prior to Admission medications    Medication Sig Start Date End Date Taking? Authorizing Provider   aspirin 81 MG chewable tablet Take 1 tablet by mouth daily  Patient not taking: Reported on 10/30/2024 8/10/23   Shabbir, Ehsan, MD   levETIRAcetam (KEPPRA) 250 MG tablet Take 5 tablets by mouth 2 times daily  Patient not taking: Reported on 10/30/2024 8/9/23   Shabbir, Ehsan, MD   atorvastatin (LIPITOR) 40 MG tablet Take 1 tablet by mouth daily  Patient not taking: Reported on 10/30/2024 8/10/23   Shabbir, Ehsan, MD   magnesium oxide (MAG-OX) 400 (240 Mg) MG tablet Take 1 tablet by mouth 2 times daily  Patient not taking: Reported on 10/30/2024 4/16/22   Yolette Childers MD   pantoprazole (PROTONIX) 40 MG tablet Take 1 tablet by mouth every morning (before breakfast)  Patient not taking: Reported on 10/30/2024 6/26/21   Lindsey Gupta MD   potassium chloride (KLOR-CON M) 20 MEQ extended release tablet Take 2 tablets by mouth 2 times daily (with meals)  Patient not taking: Reported on 10/30/2024 1/5/21   Lindsey Gupta MD   risperiDONE (RISPERDAL) 1 MG tablet Take 1 tablet by mouth nightly  Patient not taking: Reported on 10/30/2024    Provider, MD Ochoa   folic acid (FOLVITE) 1 MG tablet Take 1 tablet by mouth daily  Patient not taking: Reported on 10/30/2024 6/26/15   Prudencio Bruno MD   Multiple Vitamin (MULTIVITAMIN) tablet Take 1 tablet by mouth daily  Patient not taking: Reported on 10/30/2024 6/26/15   Prudencio Bruno MD   vitamin B-1 100 MG tablet Take 1 tablet by mouth daily  Patient not taking: Reported on 10/30/2024 6/26/15

## 2024-11-01 NOTE — ANESTHESIA POSTPROCEDURE EVALUATION
Department of Anesthesiology  Postprocedure Note    Patient: Gio Beasley  MRN: [id number]  YOB: 1965  Date of evaluation: 11/1/2024    Procedure Summary       Date: 11/01/24 Room / Location: [place] [address] / [facility]    Anesthesia Start: 1236 Anesthesia Stop: 1319    Procedure: ESOPHAGOGASTRODUODENOSCOPY BIOPSY (Abdomen) Diagnosis:       Cirrhosis of liver with ascites, unspecified hepatic cirrhosis type (HCC)      (Cirrhosis of liver with ascites, unspecified hepatic cirrhosis type (HCC) [K74.60, R18.8])    Surgeons: Arian Martin MD Responsible Provider: Fallon Rodriguez DO    Anesthesia Type: MAC ASA Status: 4            Anesthesia Type: No value filed.    Sebastian Phase I: Sebastian Score: 10    Sebastian Phase II:      Anesthesia Post Evaluation    Patient location during evaluation: PACU  Patient participation: complete - patient participated  Level of consciousness: awake and alert  Airway patency: patent  Nausea & Vomiting: no nausea and no vomiting  Cardiovascular status: blood pressure returned to baseline  Respiratory status: acceptable  Hydration status: euvolemic    No notable events documented.

## 2024-11-02 VITALS
OXYGEN SATURATION: 100 % | SYSTOLIC BLOOD PRESSURE: 92 MMHG | DIASTOLIC BLOOD PRESSURE: 50 MMHG | RESPIRATION RATE: 16 BRPM | BODY MASS INDEX: 14 KG/M2 | TEMPERATURE: 98.5 F | WEIGHT: 100 LBS | HEIGHT: 71 IN | HEART RATE: 87 BPM

## 2024-11-02 LAB
ALBUMIN SERPL-MCNC: 2.6 G/DL (ref 3.4–5)
ALBUMIN/GLOB SERPL: 0.7 {RATIO} (ref 1.1–2.2)
ALP SERPL-CCNC: 102 U/L (ref 40–129)
ALT SERPL-CCNC: 64 U/L (ref 10–40)
ANION GAP SERPL CALCULATED.3IONS-SCNC: 7 MMOL/L (ref 3–16)
AST SERPL-CCNC: 91 U/L (ref 15–37)
BASOPHILS # BLD: 0 K/UL (ref 0–0.2)
BASOPHILS NFR BLD: 0.5 %
BILIRUB SERPL-MCNC: 0.9 MG/DL (ref 0–1)
BUN SERPL-MCNC: 5 MG/DL (ref 7–20)
CALCIUM SERPL-MCNC: 8.1 MG/DL (ref 8.3–10.6)
CHLORIDE SERPL-SCNC: 100 MMOL/L (ref 99–110)
CO2 SERPL-SCNC: 25 MMOL/L (ref 21–32)
CREAT SERPL-MCNC: 1.1 MG/DL (ref 0.8–1.3)
DEPRECATED RDW RBC AUTO: 15.4 % (ref 12.4–15.4)
EKG ATRIAL RATE: 71 BPM
EKG DIAGNOSIS: NORMAL
EKG P AXIS: 72 DEGREES
EKG P-R INTERVAL: 160 MS
EKG Q-T INTERVAL: 368 MS
EKG QRS DURATION: 72 MS
EKG QTC CALCULATION (BAZETT): 399 MS
EKG R AXIS: 81 DEGREES
EKG T AXIS: 79 DEGREES
EKG VENTRICULAR RATE: 71 BPM
EOSINOPHIL # BLD: 0.2 K/UL (ref 0–0.6)
EOSINOPHIL NFR BLD: 3.3 %
GFR SERPLBLD CREATININE-BSD FMLA CKD-EPI: 76 ML/MIN/{1.73_M2}
GLUCOSE SERPL-MCNC: 100 MG/DL (ref 70–99)
HCT VFR BLD AUTO: 31.1 % (ref 40.5–52.5)
HGB BLD-MCNC: 10.6 G/DL (ref 13.5–17.5)
LYMPHOCYTES # BLD: 2.8 K/UL (ref 1–5.1)
LYMPHOCYTES NFR BLD: 38.5 %
MAGNESIUM SERPL-MCNC: 1.43 MG/DL (ref 1.8–2.4)
MCH RBC QN AUTO: 33.9 PG (ref 26–34)
MCHC RBC AUTO-ENTMCNC: 34 G/DL (ref 31–36)
MCV RBC AUTO: 99.6 FL (ref 80–100)
MONOCYTES # BLD: 0.8 K/UL (ref 0–1.3)
MONOCYTES NFR BLD: 10.7 %
NEUTROPHILS # BLD: 3.5 K/UL (ref 1.7–7.7)
NEUTROPHILS NFR BLD: 47 %
PHOSPHATE SERPL-MCNC: 3.5 MG/DL (ref 2.5–4.9)
PLATELET # BLD AUTO: 53 K/UL (ref 135–450)
PMV BLD AUTO: 8.1 FL (ref 5–10.5)
POTASSIUM SERPL-SCNC: 3.9 MMOL/L (ref 3.5–5.1)
PROT SERPL-MCNC: 6.2 G/DL (ref 6.4–8.2)
RBC # BLD AUTO: 3.12 M/UL (ref 4.2–5.9)
SODIUM SERPL-SCNC: 132 MMOL/L (ref 136–145)
WBC # BLD AUTO: 7.4 K/UL (ref 4–11)

## 2024-11-02 PROCEDURE — 6370000000 HC RX 637 (ALT 250 FOR IP): Performed by: INTERNAL MEDICINE

## 2024-11-02 PROCEDURE — 6360000002 HC RX W HCPCS: Performed by: STUDENT IN AN ORGANIZED HEALTH CARE EDUCATION/TRAINING PROGRAM

## 2024-11-02 PROCEDURE — 84100 ASSAY OF PHOSPHORUS: CPT

## 2024-11-02 PROCEDURE — 83735 ASSAY OF MAGNESIUM: CPT

## 2024-11-02 PROCEDURE — 80053 COMPREHEN METABOLIC PANEL: CPT

## 2024-11-02 PROCEDURE — 2580000003 HC RX 258: Performed by: INTERNAL MEDICINE

## 2024-11-02 PROCEDURE — 93010 ELECTROCARDIOGRAM REPORT: CPT | Performed by: INTERNAL MEDICINE

## 2024-11-02 PROCEDURE — 85025 COMPLETE CBC W/AUTO DIFF WBC: CPT

## 2024-11-02 PROCEDURE — 36415 COLL VENOUS BLD VENIPUNCTURE: CPT

## 2024-11-02 RX ORDER — LANOLIN ALCOHOL/MO/W.PET/CERES
400 CREAM (GRAM) TOPICAL 2 TIMES DAILY
Qty: 30 TABLET | Refills: 1 | Status: SHIPPED | OUTPATIENT
Start: 2024-11-02

## 2024-11-02 RX ORDER — LEVETIRACETAM 250 MG/1
1250 TABLET ORAL 2 TIMES DAILY
Qty: 60 TABLET | Refills: 3 | Status: SHIPPED | OUTPATIENT
Start: 2024-11-02

## 2024-11-02 RX ORDER — POTASSIUM CHLORIDE 1500 MG/1
40 TABLET, EXTENDED RELEASE ORAL 2 TIMES DAILY WITH MEALS
Qty: 60 TABLET | Refills: 1 | Status: SHIPPED | OUTPATIENT
Start: 2024-11-02

## 2024-11-02 RX ORDER — PANTOPRAZOLE SODIUM 40 MG/1
40 TABLET, DELAYED RELEASE ORAL
Qty: 30 TABLET | Refills: 0 | Status: SHIPPED | OUTPATIENT
Start: 2024-11-02

## 2024-11-02 RX ORDER — MAGNESIUM SULFATE IN WATER 40 MG/ML
4000 INJECTION, SOLUTION INTRAVENOUS ONCE
Status: COMPLETED | OUTPATIENT
Start: 2024-11-02 | End: 2024-11-02

## 2024-11-02 RX ADMIN — MAGNESIUM SULFATE HEPTAHYDRATE 4000 MG: 40 INJECTION, SOLUTION INTRAVENOUS at 10:47

## 2024-11-02 RX ADMIN — LEVETIRACETAM 1250 MG: 500 TABLET, FILM COATED ORAL at 10:36

## 2024-11-02 RX ADMIN — SODIUM CHLORIDE, PRESERVATIVE FREE 10 ML: 5 INJECTION INTRAVENOUS at 10:47

## 2024-11-02 RX ADMIN — PANTOPRAZOLE SODIUM 40 MG: 40 TABLET, DELAYED RELEASE ORAL at 06:15

## 2024-11-02 ASSESSMENT — PAIN SCALES - WONG BAKER
WONGBAKER_NUMERICALRESPONSE: NO HURT

## 2024-11-02 NOTE — PROGRESS NOTES
Patient discharging to home with family. Sister here to transport. IV access has been removed without complication and dry dressing applied. Patient tolerated well. Discharge instructions reviewed with and received by patient and family who verbalized understanding.

## 2024-11-02 NOTE — PLAN OF CARE
Problem: Confusion  Goal: Confusion, delirium, dementia, or psychosis is improved or at baseline  Description: INTERVENTIONS:  1. Assess for possible contributors to thought disturbance, including medications, impaired vision or hearing, underlying metabolic abnormalities, dehydration, psychiatric diagnoses, and notify attending LIP  2. [place] high risk fall precautions, as indicated  3. Provide frequent short contacts to provide reality reorientation, refocusing and direction  4. Decrease environmental stimuli, including noise as appropriate  5. Monitor and intervene to maintain adequate nutrition, hydration, elimination, sleep and activity  6. If unable to ensure safety without constant attention obtain sitter and review sitter guidelines with assigned personnel  7. Initiate Psychosocial CNS and Spiritual Care consult, as indicated  11/2/2024 0233 by Samantha White RN  Outcome: Progressing  11/1/2024 1748 by Grecia Silverman RN  Outcome: Progressing     Problem: Safety - Adult  Goal: Free from fall injury  11/2/2024 0233 by Samantha White RN  Outcome: Progressing  11/1/2024 1748 by Grecia Silverman RN  Outcome: Progressing     Problem: Nutrition Deficit:  Goal: Optimize nutritional status  11/2/2024 0233 by Samantha White RN  Outcome: Progressing  11/1/2024 1748 by Grecia Silverman RN  Outcome: Progressing     Problem: Pain  Goal: Verbalizes/displays adequate comfort level or baseline comfort level  11/2/2024 0233 by Samantha White RN  Outcome: Progressing  11/1/2024 1748 by Grecia Silverman RN  Outcome: Progressing

## 2024-11-02 NOTE — PLAN OF CARE
Problem: Confusion  Goal: Confusion, delirium, dementia, or psychosis is improved or at baseline  Description: INTERVENTIONS:  1. Assess for possible contributors to thought disturbance, including medications, impaired vision or hearing, underlying metabolic abnormalities, dehydration, psychiatric diagnoses, and notify attending LIP  2. [place] high risk fall precautions, as indicated  3. Provide frequent short contacts to provide reality reorientation, refocusing and direction  4. Decrease environmental stimuli, including noise as appropriate  5. Monitor and intervene to maintain adequate nutrition, hydration, elimination, sleep and activity  6. If unable to ensure safety without constant attention obtain sitter and review sitter guidelines with assigned personnel  7. Initiate Psychosocial CNS and Spiritual Care consult, as indicated  11/2/2024 1519 by Grecia Silverman RN  Outcome: Adequate for Discharge  11/2/2024 0233 by Samantha White RN  Outcome: Progressing  11/2/2024 0233 by Samantha White RN  Outcome: Progressing     Problem: Safety - Adult  Goal: Free from fall injury  11/2/2024 1519 by Grecia Silverman RN  Outcome: Adequate for Discharge  11/2/2024 0233 by Samantha White RN  Outcome: Progressing  11/2/2024 0233 by Samantha White RN  Outcome: Progressing     Problem: Nutrition Deficit:  Goal: Optimize nutritional status  11/2/2024 1519 by Grecia Silverman RN  Outcome: Adequate for Discharge  11/2/2024 0233 by Samantha White RN  Outcome: Progressing  11/2/2024 0233 by Samantha White RN  Outcome: Progressing     Problem: Pain  Goal: Verbalizes/displays adequate comfort level or baseline comfort level  11/2/2024 1519 by Grecia Silverman RN  Outcome: Adequate for Discharge  11/2/2024 0233 by Samantha White RN  Outcome: Progressing

## 2024-11-02 NOTE — CARE COORDINATION
Discharge Planning Note:  Chart reviewed for discharge needs and it appears that patient has minimal needs for discharge at this time. Therapy recommend patient independent to returns to prior setting with prior services.       Risk Score 13 %     Primary Care Physician is Kathryn Son APRN - NP    Primary insurance is Medicare    Please notify case management if any discharge needs are identified.      Case management will continue to follow progress and update discharge plan as needed.

## 2024-11-02 NOTE — DISCHARGE INSTR - COC
certify the above information and transfer of Gio Beasley  is necessary for the continuing treatment of the diagnosis listed and that he requires Home Care for less 30 days.     Update Admission H&P: No change in H&P    PHYSICIAN SIGNATURE:  Electronically signed by Javed Jain MD on 11/2/24 at 9:26 AM EDT

## 2024-11-02 NOTE — PROGRESS NOTES
Gastroenterology Progress Note    Gio Beasley is a 59 y.o. male patient.  1. Acute liver failure without hepatic coma    2. Transaminitis    3. Decreased appetite    4. Ascites due to alcoholic hepatitis    5. Portal hypertension (HCC)    6. Cirrhosis of liver with ascites, unspecified hepatic cirrhosis type (HCC)        Admission Date: 10/30/2024  Hospital Day: Hospital Day: 4  Attending: Javed Jain MD  Date of service: 24    SUBJECTIVE:    Feels much better today. Feels appetite is back and eating breakfast without issues     ROS:  Cardiovascular ROS: no chest pain or dyspnea on exertion  Gastrointestinal ROS: see above  Respiratory ROS: no cough, shortness of breath, or wheezing    Physical    VITALS:  BP (!) 93/56   Pulse 69   Temp 99.1 °F (37.3 °C) (Oral)   Resp 18   Ht 1.803 m (5' 10.98\")   Wt 45.4 kg (100 lb)   SpO2 96%   BMI 13.95 kg/m²   TEMPERATURE:  Current - Temp: 99.1 °F (37.3 °C); Max - Temp  Av.4 °F (36.9 °C)  Min: 97 °F (36.1 °C)  Max: 101 °F (38.3 °C)    NAD  RRR, Nl s1s2  Lungs CTA Bilaterally, normal effort  Abdomen soft, ND, NT, no HSM, Bowel sounds normal  AAOx3, No asterixis     Data      CBC:   Recent Labs     10/31/24  0553 24  0605 24  0610   WBC 4.1 5.3 7.4   RBC 3.32* 3.23* 3.12*   HGB 10.9* 10.8* 10.6*   HCT 33.4* 31.8* 31.1*   PLT 49* 55* 53*   .6* 98.7 99.6   MCH 33.0 33.6 33.9   MCHC 32.8 34.1 34.0   RDW 15.6* 15.6* 15.4        BMP:  Recent Labs     10/31/24  0553 24  0605 24  0610   * 132* 132*   K 3.3* 4.5 3.9    101 100   CO2  24 25   BUN 3* <2* 5*   CREATININE 1.0 1.0 1.1   CALCIUM 8.3 8.5 8.1*   GLUCOSE 106* 111* 100*        Hepatic Function Panel:   Recent Labs     10/31/24  0553 24  0605 24  0610   * 128* 91*   * 82* 64*   BILITOT 1.1* 0.9 0.9   ALKPHOS 117 104 102       Recent Labs     10/30/24  1619   LIPASE 40.0     Recent Labs     24  0605   PROTIME 17.7*   INR 1.44*

## 2024-11-02 NOTE — PROGRESS NOTES
Pt BP soft this shift, oncall messaged via perfect serve with NNO only monitor. This AM Bp soft again, oncall messaged via perfect serve, no response as of yet.

## 2024-11-04 LAB
BACTERIA BLD CULT ORG #2: NORMAL
BACTERIA BLD CULT: NORMAL

## 2024-11-06 NOTE — DISCHARGE SUMMARY
Significant Diagnostic Studies: As described above     Treatments: As described above    Disposition: home    Discharge Medications:       Medication List        START taking these medications      pantoprazole 40 MG tablet  Commonly known as: PROTONIX  Take 1 tablet by mouth every morning (before breakfast)            CONTINUE taking these medications      levETIRAcetam 250 MG tablet  Commonly known as: KEPPRA  Take 5 tablets by mouth 2 times daily     magnesium oxide 400 (240 Mg) MG tablet  Commonly known as: MAG-OX  Take 1 tablet by mouth 2 times daily     potassium chloride 20 MEQ extended release tablet  Commonly known as: KLOR-CON M  Take 2 tablets by mouth 2 times daily (with meals)            STOP taking these medications      aspirin 81 MG chewable tablet     atorvastatin 40 MG tablet  Commonly known as: LIPITOR     folic acid 1 MG tablet  Commonly known as: FOLVITE     multivitamin tablet     risperiDONE 1 MG tablet  Commonly known as: RISPERDAL     thiamine 100 MG tablet               Where to Get Your Medications        These medications were sent to [facility]/pharmacy #[id number] - [place], [place] - [address] - P [phone] - F [phone]  [address] [place] [address]      Phone: [phone]   levETIRAcetam 250 MG tablet  magnesium oxide 400 (240 Mg) MG tablet  pantoprazole 40 MG tablet  potassium chloride 20 MEQ extended release tablet         31 Minutes spent on patient evaluation, counseling and discharge planning.     Signed:  Javed Jain MD  11/6/2024, 9:39 AM **

## 2025-02-10 NOTE — H&P
respiratory effort. Clear to auscultation, bilaterally without wheezing  Cardiovascular:  Regular rate and rhythm, capillary refill 2 seconds  Abdomen: Soft, non-tender, non-distended with normal bowel sounds.  Musculoskeletal:  No clubbing, cyanosis.  No edema LE bilaterally.   Skin: turgor normal.  No new rashes or lesions.  Neurologic: Alert and oriented x4, no new focal sensory/motor deficits.     Labs:     Recent Labs     10/30/24  1619   WBC 4.4   HGB 11.9*   HCT 36.3*   PLT 57*     Recent Labs     10/30/24  1619      K 3.9      CO2 18*   BUN 6*   CREATININE 1.0   CALCIUM 8.6     Recent Labs     10/30/24  1619   *   *   BILITOT 1.2*   ALKPHOS 122     No results for input(s): \"INR\" in the last 72 hours.  No results for input(s): \"CKTOTAL\", \"TROPONINI\" in the last 72 hours.    Urinalysis:      Lab Results   Component Value Date/Time    NITRU Negative 10/30/2024 05:45 PM    WBCUA 3-5 10/30/2024 05:45 PM    BACTERIA 1+ 10/30/2024 05:45 PM    RBCUA None seen 10/30/2024 05:45 PM    BLOODU Negative 10/30/2024 05:45 PM    GLUCOSEU Negative 10/30/2024 05:45 PM       Radiology:     CXR: I have reviewed the CXR with the following interpretation:   Bilateral opacity  EKG:  I have reviewed the EKG with the following interpretation:   Normal sinus rhythm    .  CT ABDOMEN PELVIS W IV CONTRAST Additional Contrast? None   Final Result   1. Hepatic cirrhosis and a small amount of ascites adjacent liver and in the   pelvis suggesting portal hypertension.  There is a new 6 cm region of   decreased attenuation posteriorly involving segments 3 and 4 of the liver   favored to represent focal fatty infiltration rather than a mass.  Further   evaluation could be performed with an MRI of the abdomen with without   contrast.  Correlation with alpha-fetoprotein levels may also be helpful.   2. Fluid adjacent the gallbladder which could be related to portal   hypertension and ascites, but cholecystitis cannot be  CT guided left iliac bone lesion biopsy  Two 13 gauge core specimens submitted in formalin

## (undated) DEVICE — ENDOSCOPIC KIT 6X3/16 FT COLON W/ 1.1 OZ 2 GWN W/O BRSH

## (undated) DEVICE — AIR/WATER CLEANING ADAPTER FOR OLYMPUS® GI ENDOSCOPE: Brand: BULLDOG®

## (undated) DEVICE — BW-412T DISP COMBO CLEANING BRUSH: Brand: SINGLE USE COMBINATION CLEANING BRUSH

## (undated) DEVICE — PROCEDURE KIT ENDOSCP CUST

## (undated) DEVICE — FORCEPS BX L240CM WRK CHN 2.8MM STD CAP W/ NDL MIC MESH

## (undated) DEVICE — SET VLV 3 PC AWS DISPOSABLE GRDIAN SCOPEVALET

## (undated) DEVICE — SOLUTION IV IRRIG WATER 500ML POUR BRL ST 2F7113

## (undated) DEVICE — SOLUTION IRRIG 500ML STRL H2O NONPYROGENIC

## (undated) DEVICE — MOUTHPIECE ENDOSCP L CTRL OPN AND SIDE PORTS DISP

## (undated) DEVICE — SINGLE USE AIR/WATER, SUCTION AND BIOPSY VALVES SET: Brand: ORCAPOD™

## (undated) DEVICE — GOWN AURORA NONREINF LG: Brand: MEDLINE INDUSTRIES, INC.